# Patient Record
Sex: MALE | Race: WHITE | URBAN - METROPOLITAN AREA
[De-identification: names, ages, dates, MRNs, and addresses within clinical notes are randomized per-mention and may not be internally consistent; named-entity substitution may affect disease eponyms.]

---

## 2017-01-01 ENCOUNTER — APPOINTMENT (OUTPATIENT)
Dept: OCCUPATIONAL THERAPY | Facility: CLINIC | Age: 82
DRG: 246 | End: 2017-01-01
Attending: INTERNAL MEDICINE
Payer: MEDICARE

## 2017-01-01 ENCOUNTER — APPOINTMENT (OUTPATIENT)
Dept: CARDIOLOGY | Facility: CLINIC | Age: 82
DRG: 246 | End: 2017-01-01
Attending: INTERNAL MEDICINE
Payer: MEDICARE

## 2017-01-01 ENCOUNTER — APPOINTMENT (OUTPATIENT)
Dept: GENERAL RADIOLOGY | Facility: CLINIC | Age: 82
DRG: 246 | End: 2017-01-01
Attending: PHYSICIAN ASSISTANT
Payer: MEDICARE

## 2017-01-01 ENCOUNTER — HOSPITAL ENCOUNTER (INPATIENT)
Facility: CLINIC | Age: 82
LOS: 14 days | DRG: 246 | End: 2017-05-10
Attending: INTERNAL MEDICINE | Admitting: INTERNAL MEDICINE
Payer: MEDICARE

## 2017-01-01 ENCOUNTER — APPOINTMENT (OUTPATIENT)
Dept: GENERAL RADIOLOGY | Facility: CLINIC | Age: 82
End: 2017-01-01
Attending: EMERGENCY MEDICINE
Payer: MEDICARE

## 2017-01-01 ENCOUNTER — APPOINTMENT (OUTPATIENT)
Dept: GENERAL RADIOLOGY | Facility: CLINIC | Age: 82
DRG: 246 | End: 2017-01-01
Attending: INTERNAL MEDICINE
Payer: MEDICARE

## 2017-01-01 ENCOUNTER — APPOINTMENT (OUTPATIENT)
Dept: GENERAL RADIOLOGY | Facility: CLINIC | Age: 82
DRG: 246 | End: 2017-01-01
Attending: HOSPITALIST
Payer: MEDICARE

## 2017-01-01 ENCOUNTER — APPOINTMENT (OUTPATIENT)
Dept: ULTRASOUND IMAGING | Facility: CLINIC | Age: 82
DRG: 246 | End: 2017-01-01
Attending: HOSPITALIST
Payer: MEDICARE

## 2017-01-01 ENCOUNTER — APPOINTMENT (OUTPATIENT)
Dept: GENERAL RADIOLOGY | Facility: CLINIC | Age: 82
DRG: 246 | End: 2017-01-01
Attending: RADIOLOGY
Payer: MEDICARE

## 2017-01-01 ENCOUNTER — APPOINTMENT (OUTPATIENT)
Dept: GENERAL RADIOLOGY | Facility: CLINIC | Age: 82
DRG: 246 | End: 2017-01-01
Attending: STUDENT IN AN ORGANIZED HEALTH CARE EDUCATION/TRAINING PROGRAM
Payer: MEDICARE

## 2017-01-01 ENCOUNTER — HOSPITAL ENCOUNTER (EMERGENCY)
Facility: CLINIC | Age: 82
Discharge: OTHER INSTITUTION | End: 2017-04-26
Attending: EMERGENCY MEDICINE | Admitting: EMERGENCY MEDICINE
Payer: MEDICARE

## 2017-01-01 ENCOUNTER — APPOINTMENT (OUTPATIENT)
Dept: CT IMAGING | Facility: CLINIC | Age: 82
DRG: 246 | End: 2017-01-01
Attending: INTERNAL MEDICINE
Payer: MEDICARE

## 2017-01-01 VITALS
WEIGHT: 147.93 LBS | SYSTOLIC BLOOD PRESSURE: 120 MMHG | TEMPERATURE: 97 F | HEART RATE: 67 BPM | HEIGHT: 65 IN | DIASTOLIC BLOOD PRESSURE: 87 MMHG | BODY MASS INDEX: 24.65 KG/M2 | OXYGEN SATURATION: 95 %

## 2017-01-01 VITALS
SYSTOLIC BLOOD PRESSURE: 131 MMHG | BODY MASS INDEX: 24.13 KG/M2 | RESPIRATION RATE: 18 BRPM | OXYGEN SATURATION: 96 % | DIASTOLIC BLOOD PRESSURE: 86 MMHG | HEART RATE: 101 BPM | WEIGHT: 145 LBS | TEMPERATURE: 98 F

## 2017-01-01 DIAGNOSIS — I50.9 CHF (CONGESTIVE HEART FAILURE) (H): Primary | ICD-10-CM

## 2017-01-01 DIAGNOSIS — I21.3 ST ELEVATION MYOCARDIAL INFARCTION (STEMI), UNSPECIFIED ARTERY (H): ICD-10-CM

## 2017-01-01 LAB
ALBUMIN SERPL-MCNC: 2.2 G/DL (ref 3.4–5)
ALBUMIN UR-MCNC: 30 MG/DL
ALP SERPL-CCNC: 85 U/L (ref 40–150)
ALT SERPL W P-5'-P-CCNC: 34 U/L (ref 0–70)
ANION GAP SERPL CALCULATED.3IONS-SCNC: 11 MMOL/L (ref 3–14)
ANION GAP SERPL CALCULATED.3IONS-SCNC: 6 MMOL/L (ref 3–14)
ANION GAP SERPL CALCULATED.3IONS-SCNC: 7 MMOL/L (ref 3–14)
ANION GAP SERPL CALCULATED.3IONS-SCNC: 7 MMOL/L (ref 3–14)
ANION GAP SERPL CALCULATED.3IONS-SCNC: 8 MMOL/L (ref 3–14)
ANION GAP SERPL CALCULATED.3IONS-SCNC: 9 MMOL/L (ref 3–14)
APPEARANCE FLD: NORMAL
APPEARANCE FLD: NORMAL
APPEARANCE UR: CLEAR
APTT PPP: 121 SEC (ref 22–37)
AST SERPL W P-5'-P-CCNC: 39 U/L (ref 0–45)
BACTERIA SPEC CULT: NO GROWTH
BACTERIA SPEC CULT: NO GROWTH
BASE EXCESS BLDA CALC-SCNC: 3.4 MMOL/L
BASE EXCESS BLDA CALC-SCNC: 3.7 MMOL/L
BASOPHILS # BLD AUTO: 0 10E9/L (ref 0–0.2)
BASOPHILS # BLD AUTO: 0.1 10E9/L (ref 0–0.2)
BASOPHILS NFR BLD AUTO: 0.2 %
BASOPHILS NFR BLD AUTO: 0.4 %
BASOPHILS NFR FLD MANUAL: 1 %
BILIRUB DIRECT SERPL-MCNC: 0.2 MG/DL (ref 0–0.2)
BILIRUB SERPL-MCNC: 0.5 MG/DL (ref 0.2–1.3)
BILIRUB UR QL STRIP: NEGATIVE
BUN SERPL-MCNC: 13 MG/DL (ref 7–30)
BUN SERPL-MCNC: 18 MG/DL (ref 7–30)
BUN SERPL-MCNC: 33 MG/DL (ref 7–30)
BUN SERPL-MCNC: 35 MG/DL (ref 7–30)
BUN SERPL-MCNC: 36 MG/DL (ref 7–30)
BUN SERPL-MCNC: 37 MG/DL (ref 7–30)
BUN SERPL-MCNC: 37 MG/DL (ref 7–30)
BUN SERPL-MCNC: 40 MG/DL (ref 7–30)
BUN SERPL-MCNC: 40 MG/DL (ref 7–30)
BUN SERPL-MCNC: 42 MG/DL (ref 7–30)
BUN SERPL-MCNC: 42 MG/DL (ref 7–30)
BUN SERPL-MCNC: 44 MG/DL (ref 7–30)
BUN SERPL-MCNC: 45 MG/DL (ref 7–30)
BUN SERPL-MCNC: 50 MG/DL (ref 7–30)
BUN SERPL-MCNC: 52 MG/DL (ref 7–30)
BUN SERPL-MCNC: 54 MG/DL (ref 7–30)
CALCIUM SERPL-MCNC: 7.5 MG/DL (ref 8.5–10.1)
CALCIUM SERPL-MCNC: 8.1 MG/DL (ref 8.5–10.1)
CALCIUM SERPL-MCNC: 8.2 MG/DL (ref 8.5–10.1)
CALCIUM SERPL-MCNC: 8.2 MG/DL (ref 8.5–10.1)
CALCIUM SERPL-MCNC: 8.3 MG/DL (ref 8.5–10.1)
CALCIUM SERPL-MCNC: 8.4 MG/DL (ref 8.5–10.1)
CALCIUM SERPL-MCNC: 8.5 MG/DL (ref 8.5–10.1)
CALCIUM SERPL-MCNC: 8.6 MG/DL (ref 8.5–10.1)
CALCIUM SERPL-MCNC: 8.7 MG/DL (ref 8.5–10.1)
CALCIUM SERPL-MCNC: 8.7 MG/DL (ref 8.5–10.1)
CALCIUM SERPL-MCNC: 8.8 MG/DL (ref 8.5–10.1)
CHLORIDE SERPL-SCNC: 100 MMOL/L (ref 94–109)
CHLORIDE SERPL-SCNC: 101 MMOL/L (ref 94–109)
CHLORIDE SERPL-SCNC: 102 MMOL/L (ref 94–109)
CHLORIDE SERPL-SCNC: 102 MMOL/L (ref 94–109)
CHLORIDE SERPL-SCNC: 103 MMOL/L (ref 94–109)
CHLORIDE SERPL-SCNC: 104 MMOL/L (ref 94–109)
CHLORIDE SERPL-SCNC: 105 MMOL/L (ref 94–109)
CHLORIDE SERPL-SCNC: 109 MMOL/L (ref 94–109)
CHLORIDE SERPL-SCNC: 111 MMOL/L (ref 94–109)
CHLORIDE SERPL-SCNC: 99 MMOL/L (ref 94–109)
CHLORIDE SERPL-SCNC: 99 MMOL/L (ref 94–109)
CHOLEST SERPL-MCNC: 191 MG/DL
CO2 BLD-SCNC: 27 MMOL/L (ref 21–28)
CO2 BLDCOV-SCNC: 27 MMOL/L (ref 21–28)
CO2 BLDCOV-SCNC: 28 MMOL/L (ref 21–28)
CO2 SERPL-SCNC: 25 MMOL/L (ref 20–32)
CO2 SERPL-SCNC: 27 MMOL/L (ref 20–32)
CO2 SERPL-SCNC: 28 MMOL/L (ref 20–32)
CO2 SERPL-SCNC: 28 MMOL/L (ref 20–32)
CO2 SERPL-SCNC: 29 MMOL/L (ref 20–32)
CO2 SERPL-SCNC: 30 MMOL/L (ref 20–32)
CO2 SERPL-SCNC: 31 MMOL/L (ref 20–32)
CO2 SERPL-SCNC: 32 MMOL/L (ref 20–32)
COLOR FLD: NORMAL
COLOR FLD: YELLOW
COLOR UR AUTO: YELLOW
COPATH REPORT: NORMAL
COPATH REPORT: NORMAL
CREAT BLD-MCNC: 0.9 MG/DL (ref 0.66–1.25)
CREAT SERPL-MCNC: 0.83 MG/DL (ref 0.66–1.25)
CREAT SERPL-MCNC: 0.96 MG/DL (ref 0.66–1.25)
CREAT SERPL-MCNC: 1.13 MG/DL (ref 0.66–1.25)
CREAT SERPL-MCNC: 1.15 MG/DL (ref 0.66–1.25)
CREAT SERPL-MCNC: 1.17 MG/DL (ref 0.66–1.25)
CREAT SERPL-MCNC: 1.17 MG/DL (ref 0.66–1.25)
CREAT SERPL-MCNC: 1.19 MG/DL (ref 0.66–1.25)
CREAT SERPL-MCNC: 1.19 MG/DL (ref 0.66–1.25)
CREAT SERPL-MCNC: 1.21 MG/DL (ref 0.66–1.25)
CREAT SERPL-MCNC: 1.23 MG/DL (ref 0.66–1.25)
CREAT SERPL-MCNC: 1.27 MG/DL (ref 0.66–1.25)
CREAT SERPL-MCNC: 1.29 MG/DL (ref 0.66–1.25)
CREAT SERPL-MCNC: 1.35 MG/DL (ref 0.66–1.25)
CREAT SERPL-MCNC: 1.37 MG/DL (ref 0.66–1.25)
CREAT SERPL-MCNC: 1.4 MG/DL (ref 0.66–1.25)
CREAT SERPL-MCNC: 1.44 MG/DL (ref 0.66–1.25)
CREAT SERPL-MCNC: 1.58 MG/DL (ref 0.66–1.25)
DIFFERENTIAL METHOD BLD: ABNORMAL
EOSINOPHIL # BLD AUTO: 0 10E9/L (ref 0–0.7)
EOSINOPHIL # BLD AUTO: 0.2 10E9/L (ref 0–0.7)
EOSINOPHIL # BLD AUTO: 0.5 10E9/L (ref 0–0.7)
EOSINOPHIL # BLD AUTO: 0.8 10E9/L (ref 0–0.7)
EOSINOPHIL NFR BLD AUTO: 0.1 %
EOSINOPHIL NFR BLD AUTO: 1.1 %
EOSINOPHIL NFR BLD AUTO: 2.6 %
EOSINOPHIL NFR BLD AUTO: 4.7 %
EOSINOPHIL NFR FLD MANUAL: 1 %
EOSINOPHIL NFR FLD MANUAL: 8 %
ERYTHROCYTE [DISTWIDTH] IN BLOOD BY AUTOMATED COUNT: 15.2 % (ref 10–15)
ERYTHROCYTE [DISTWIDTH] IN BLOOD BY AUTOMATED COUNT: 15.2 % (ref 10–15)
ERYTHROCYTE [DISTWIDTH] IN BLOOD BY AUTOMATED COUNT: 15.3 % (ref 10–15)
ERYTHROCYTE [DISTWIDTH] IN BLOOD BY AUTOMATED COUNT: 15.4 % (ref 10–15)
ERYTHROCYTE [DISTWIDTH] IN BLOOD BY AUTOMATED COUNT: 15.4 % (ref 10–15)
ERYTHROCYTE [DISTWIDTH] IN BLOOD BY AUTOMATED COUNT: 15.5 % (ref 10–15)
ERYTHROCYTE [DISTWIDTH] IN BLOOD BY AUTOMATED COUNT: 15.6 % (ref 10–15)
ERYTHROCYTE [DISTWIDTH] IN BLOOD BY AUTOMATED COUNT: 15.7 % (ref 10–15)
ERYTHROCYTE [DISTWIDTH] IN BLOOD BY AUTOMATED COUNT: 15.7 % (ref 10–15)
FLUAV H1 2009 PAND RNA SPEC QL NAA+PROBE: NEGATIVE
FLUAV H1 RNA SPEC QL NAA+PROBE: NEGATIVE
FLUAV H3 RNA SPEC QL NAA+PROBE: NEGATIVE
FLUAV RNA SPEC QL NAA+PROBE: NEGATIVE
FLUBV RNA SPEC QL NAA+PROBE: NEGATIVE
GFR SERPL CREATININE-BSD FRML MDRD: 42 ML/MIN/1.7M2
GFR SERPL CREATININE-BSD FRML MDRD: 47 ML/MIN/1.7M2
GFR SERPL CREATININE-BSD FRML MDRD: 49 ML/MIN/1.7M2
GFR SERPL CREATININE-BSD FRML MDRD: 50 ML/MIN/1.7M2
GFR SERPL CREATININE-BSD FRML MDRD: 51 ML/MIN/1.7M2
GFR SERPL CREATININE-BSD FRML MDRD: 53 ML/MIN/1.7M2
GFR SERPL CREATININE-BSD FRML MDRD: 54 ML/MIN/1.7M2
GFR SERPL CREATININE-BSD FRML MDRD: 56 ML/MIN/1.7M2
GFR SERPL CREATININE-BSD FRML MDRD: 57 ML/MIN/1.7M2
GFR SERPL CREATININE-BSD FRML MDRD: 59 ML/MIN/1.7M2
GFR SERPL CREATININE-BSD FRML MDRD: 59 ML/MIN/1.7M2
GFR SERPL CREATININE-BSD FRML MDRD: 60 ML/MIN/1.7M2
GFR SERPL CREATININE-BSD FRML MDRD: 60 ML/MIN/1.7M2
GFR SERPL CREATININE-BSD FRML MDRD: 61 ML/MIN/1.7M2
GFR SERPL CREATININE-BSD FRML MDRD: 62 ML/MIN/1.7M2
GFR SERPL CREATININE-BSD FRML MDRD: 75 ML/MIN/1.7M2
GFR SERPL CREATININE-BSD FRML MDRD: 81 ML/MIN/1.7M2
GFR SERPL CREATININE-BSD FRML MDRD: 89 ML/MIN/1.7M2
GLUCOSE BLDC GLUCOMTR-MCNC: 100 MG/DL (ref 70–99)
GLUCOSE BLDC GLUCOMTR-MCNC: 100 MG/DL (ref 70–99)
GLUCOSE BLDC GLUCOMTR-MCNC: 101 MG/DL (ref 70–99)
GLUCOSE BLDC GLUCOMTR-MCNC: 101 MG/DL (ref 70–99)
GLUCOSE BLDC GLUCOMTR-MCNC: 102 MG/DL (ref 70–99)
GLUCOSE BLDC GLUCOMTR-MCNC: 103 MG/DL (ref 70–99)
GLUCOSE BLDC GLUCOMTR-MCNC: 103 MG/DL (ref 70–99)
GLUCOSE BLDC GLUCOMTR-MCNC: 105 MG/DL (ref 70–99)
GLUCOSE BLDC GLUCOMTR-MCNC: 105 MG/DL (ref 70–99)
GLUCOSE BLDC GLUCOMTR-MCNC: 107 MG/DL (ref 70–99)
GLUCOSE BLDC GLUCOMTR-MCNC: 108 MG/DL (ref 70–99)
GLUCOSE BLDC GLUCOMTR-MCNC: 108 MG/DL (ref 70–99)
GLUCOSE BLDC GLUCOMTR-MCNC: 109 MG/DL (ref 70–99)
GLUCOSE BLDC GLUCOMTR-MCNC: 11 MG/DL (ref 70–99)
GLUCOSE BLDC GLUCOMTR-MCNC: 110 MG/DL (ref 70–99)
GLUCOSE BLDC GLUCOMTR-MCNC: 112 MG/DL (ref 70–99)
GLUCOSE BLDC GLUCOMTR-MCNC: 114 MG/DL (ref 70–99)
GLUCOSE BLDC GLUCOMTR-MCNC: 115 MG/DL (ref 70–99)
GLUCOSE BLDC GLUCOMTR-MCNC: 116 MG/DL (ref 70–99)
GLUCOSE BLDC GLUCOMTR-MCNC: 117 MG/DL (ref 70–99)
GLUCOSE BLDC GLUCOMTR-MCNC: 119 MG/DL (ref 70–99)
GLUCOSE BLDC GLUCOMTR-MCNC: 122 MG/DL (ref 70–99)
GLUCOSE BLDC GLUCOMTR-MCNC: 125 MG/DL (ref 70–99)
GLUCOSE BLDC GLUCOMTR-MCNC: 126 MG/DL (ref 70–99)
GLUCOSE BLDC GLUCOMTR-MCNC: 129 MG/DL (ref 70–99)
GLUCOSE BLDC GLUCOMTR-MCNC: 133 MG/DL (ref 70–99)
GLUCOSE BLDC GLUCOMTR-MCNC: 141 MG/DL (ref 70–99)
GLUCOSE BLDC GLUCOMTR-MCNC: 143 MG/DL (ref 70–99)
GLUCOSE BLDC GLUCOMTR-MCNC: 143 MG/DL (ref 70–99)
GLUCOSE BLDC GLUCOMTR-MCNC: 147 MG/DL (ref 70–99)
GLUCOSE BLDC GLUCOMTR-MCNC: 148 MG/DL (ref 70–99)
GLUCOSE BLDC GLUCOMTR-MCNC: 152 MG/DL (ref 70–99)
GLUCOSE BLDC GLUCOMTR-MCNC: 154 MG/DL (ref 70–99)
GLUCOSE BLDC GLUCOMTR-MCNC: 157 MG/DL (ref 70–99)
GLUCOSE BLDC GLUCOMTR-MCNC: 159 MG/DL (ref 70–99)
GLUCOSE BLDC GLUCOMTR-MCNC: 160 MG/DL (ref 70–99)
GLUCOSE BLDC GLUCOMTR-MCNC: 161 MG/DL (ref 70–99)
GLUCOSE BLDC GLUCOMTR-MCNC: 161 MG/DL (ref 70–99)
GLUCOSE BLDC GLUCOMTR-MCNC: 164 MG/DL (ref 70–99)
GLUCOSE BLDC GLUCOMTR-MCNC: 165 MG/DL (ref 70–99)
GLUCOSE BLDC GLUCOMTR-MCNC: 165 MG/DL (ref 70–99)
GLUCOSE BLDC GLUCOMTR-MCNC: 172 MG/DL (ref 70–99)
GLUCOSE BLDC GLUCOMTR-MCNC: 175 MG/DL (ref 70–99)
GLUCOSE BLDC GLUCOMTR-MCNC: 176 MG/DL (ref 70–99)
GLUCOSE BLDC GLUCOMTR-MCNC: 179 MG/DL (ref 70–99)
GLUCOSE BLDC GLUCOMTR-MCNC: 179 MG/DL (ref 70–99)
GLUCOSE BLDC GLUCOMTR-MCNC: 191 MG/DL (ref 70–99)
GLUCOSE BLDC GLUCOMTR-MCNC: 202 MG/DL (ref 70–99)
GLUCOSE BLDC GLUCOMTR-MCNC: 207 MG/DL (ref 70–99)
GLUCOSE BLDC GLUCOMTR-MCNC: 253 MG/DL (ref 70–99)
GLUCOSE BLDC GLUCOMTR-MCNC: 315 MG/DL (ref 70–99)
GLUCOSE BLDC GLUCOMTR-MCNC: 32 MG/DL (ref 70–99)
GLUCOSE BLDC GLUCOMTR-MCNC: 43 MG/DL (ref 70–99)
GLUCOSE BLDC GLUCOMTR-MCNC: 57 MG/DL (ref 70–99)
GLUCOSE BLDC GLUCOMTR-MCNC: 58 MG/DL (ref 70–99)
GLUCOSE BLDC GLUCOMTR-MCNC: 60 MG/DL (ref 70–99)
GLUCOSE BLDC GLUCOMTR-MCNC: 69 MG/DL (ref 70–99)
GLUCOSE BLDC GLUCOMTR-MCNC: 69 MG/DL (ref 70–99)
GLUCOSE BLDC GLUCOMTR-MCNC: 70 MG/DL (ref 70–99)
GLUCOSE BLDC GLUCOMTR-MCNC: 72 MG/DL (ref 70–99)
GLUCOSE BLDC GLUCOMTR-MCNC: 77 MG/DL (ref 70–99)
GLUCOSE BLDC GLUCOMTR-MCNC: 80 MG/DL (ref 70–99)
GLUCOSE BLDC GLUCOMTR-MCNC: 83 MG/DL (ref 70–99)
GLUCOSE BLDC GLUCOMTR-MCNC: 87 MG/DL (ref 70–99)
GLUCOSE BLDC GLUCOMTR-MCNC: 89 MG/DL (ref 70–99)
GLUCOSE BLDC GLUCOMTR-MCNC: 93 MG/DL (ref 70–99)
GLUCOSE BLDC GLUCOMTR-MCNC: 96 MG/DL (ref 70–99)
GLUCOSE BLDC GLUCOMTR-MCNC: 97 MG/DL (ref 70–99)
GLUCOSE BLDC GLUCOMTR-MCNC: 97 MG/DL (ref 70–99)
GLUCOSE BLDC GLUCOMTR-MCNC: 98 MG/DL (ref 70–99)
GLUCOSE BLDC GLUCOMTR-MCNC: <10 MG/DL (ref 70–99)
GLUCOSE FLD-MCNC: 165 MG/DL
GLUCOSE FLD-MCNC: 167 MG/DL
GLUCOSE SERPL-MCNC: 120 MG/DL (ref 70–99)
GLUCOSE SERPL-MCNC: 123 MG/DL (ref 70–99)
GLUCOSE SERPL-MCNC: 128 MG/DL (ref 70–99)
GLUCOSE SERPL-MCNC: 130 MG/DL (ref 70–99)
GLUCOSE SERPL-MCNC: 130 MG/DL (ref 70–99)
GLUCOSE SERPL-MCNC: 134 MG/DL (ref 70–99)
GLUCOSE SERPL-MCNC: 140 MG/DL (ref 70–99)
GLUCOSE SERPL-MCNC: 140 MG/DL (ref 70–99)
GLUCOSE SERPL-MCNC: 141 MG/DL (ref 70–99)
GLUCOSE SERPL-MCNC: 145 MG/DL (ref 70–99)
GLUCOSE SERPL-MCNC: 152 MG/DL (ref 70–99)
GLUCOSE SERPL-MCNC: 153 MG/DL (ref 70–99)
GLUCOSE SERPL-MCNC: 153 MG/DL (ref 70–99)
GLUCOSE SERPL-MCNC: 187 MG/DL (ref 70–99)
GLUCOSE SERPL-MCNC: 200 MG/DL (ref 70–99)
GLUCOSE SERPL-MCNC: 259 MG/DL (ref 70–99)
GLUCOSE SERPL-MCNC: 294 MG/DL (ref 70–99)
GLUCOSE UR STRIP-MCNC: NEGATIVE MG/DL
GRAM STN SPEC: NORMAL
GRAM STN SPEC: NORMAL
HADV DNA SPEC QL NAA+PROBE: NEGATIVE
HADV DNA SPEC QL NAA+PROBE: NEGATIVE
HBA1C MFR BLD: 6.3 % (ref 4.3–6)
HCO3 BLD-SCNC: 27 MMOL/L (ref 21–28)
HCO3 BLD-SCNC: 27 MMOL/L (ref 21–28)
HCT VFR BLD AUTO: 33.1 % (ref 40–53)
HCT VFR BLD AUTO: 38.1 % (ref 40–53)
HCT VFR BLD AUTO: 38.3 % (ref 40–53)
HCT VFR BLD AUTO: 39.2 % (ref 40–53)
HCT VFR BLD AUTO: 39.2 % (ref 40–53)
HCT VFR BLD AUTO: 39.5 % (ref 40–53)
HCT VFR BLD AUTO: 39.9 % (ref 40–53)
HCT VFR BLD AUTO: 40.3 % (ref 40–53)
HCT VFR BLD AUTO: 40.8 % (ref 40–53)
HCT VFR BLD AUTO: 44 % (ref 40–53)
HCT VFR BLD AUTO: 47.6 % (ref 40–53)
HDLC SERPL-MCNC: 40 MG/DL
HGB BLD-MCNC: 11 G/DL (ref 13.3–17.7)
HGB BLD-MCNC: 12.7 G/DL (ref 13.3–17.7)
HGB BLD-MCNC: 12.8 G/DL (ref 13.3–17.7)
HGB BLD-MCNC: 12.9 G/DL (ref 13.3–17.7)
HGB BLD-MCNC: 13 G/DL (ref 13.3–17.7)
HGB BLD-MCNC: 13.1 G/DL (ref 13.3–17.7)
HGB BLD-MCNC: 13.3 G/DL (ref 13.3–17.7)
HGB BLD-MCNC: 13.4 G/DL (ref 13.3–17.7)
HGB BLD-MCNC: 13.6 G/DL (ref 13.3–17.7)
HGB BLD-MCNC: 13.8 G/DL (ref 13.3–17.7)
HGB BLD-MCNC: 14.8 G/DL (ref 13.3–17.7)
HGB BLD-MCNC: 15.6 G/DL (ref 13.3–17.7)
HGB UR QL STRIP: NEGATIVE
HMPV RNA SPEC QL NAA+PROBE: NEGATIVE
HPIV1 RNA SPEC QL NAA+PROBE: NEGATIVE
HPIV2 RNA SPEC QL NAA+PROBE: NEGATIVE
HPIV3 RNA SPEC QL NAA+PROBE: NEGATIVE
IMM GRANULOCYTES # BLD: 0.1 10E9/L (ref 0–0.4)
IMM GRANULOCYTES # BLD: 0.1 10E9/L (ref 0–0.4)
IMM GRANULOCYTES # BLD: 0.2 10E9/L (ref 0–0.4)
IMM GRANULOCYTES # BLD: 0.2 10E9/L (ref 0–0.4)
IMM GRANULOCYTES NFR BLD: 0.5 %
IMM GRANULOCYTES NFR BLD: 0.7 %
IMM GRANULOCYTES NFR BLD: 0.8 %
IMM GRANULOCYTES NFR BLD: 0.8 %
INR PPP: 0.96 (ref 0.86–1.14)
INTERPRETATION ECG - MUSE: NORMAL
KCT BLD-ACNC: 164 SEC (ref 105–167)
KCT BLD-ACNC: 224 SEC (ref 105–167)
KCT BLD-ACNC: 619 SEC (ref 105–167)
KETONES UR STRIP-MCNC: NEGATIVE MG/DL
LACTATE BLD-SCNC: 1.1 MMOL/L (ref 0.7–2.1)
LACTATE BLD-SCNC: 1.3 MMOL/L (ref 0.7–2.1)
LACTATE BLD-SCNC: 1.6 MMOL/L (ref 0.7–2.1)
LACTATE BLD-SCNC: 2 MMOL/L (ref 0.7–2.1)
LACTATE BLD-SCNC: 2.2 MMOL/L (ref 0.7–2.1)
LACTATE BLD-SCNC: 2.9 MMOL/L (ref 0.7–2.1)
LACTATE BLD-SCNC: 3.6 MMOL/L (ref 0.7–2.1)
LACTATE BLD-SCNC: 3.6 MMOL/L (ref 0.7–2.1)
LDH FLD L TO P-CCNC: 356 U/L
LDH FLD L TO P-CCNC: 535 U/L
LDH SERPL L TO P-CCNC: 429 U/L (ref 85–227)
LDH SERPL L TO P-CCNC: 515 U/L (ref 85–227)
LDLC SERPL CALC-MCNC: 138 MG/DL
LEUKOCYTE ESTERASE UR QL STRIP: NEGATIVE
LYMPHOCYTES # BLD AUTO: 0.7 10E9/L (ref 0.8–5.3)
LYMPHOCYTES # BLD AUTO: 1.1 10E9/L (ref 0.8–5.3)
LYMPHOCYTES # BLD AUTO: 1.2 10E9/L (ref 0.8–5.3)
LYMPHOCYTES # BLD AUTO: 1.5 10E9/L (ref 0.8–5.3)
LYMPHOCYTES NFR BLD AUTO: 3.6 %
LYMPHOCYTES NFR BLD AUTO: 5.5 %
LYMPHOCYTES NFR BLD AUTO: 7.9 %
LYMPHOCYTES NFR BLD AUTO: 8.8 %
LYMPHOCYTES NFR FLD MANUAL: 50 %
LYMPHOCYTES NFR FLD MANUAL: 60 %
MAGNESIUM SERPL-MCNC: 2.7 MG/DL (ref 1.6–2.3)
MCH RBC QN AUTO: 29.7 PG (ref 26.5–33)
MCH RBC QN AUTO: 29.7 PG (ref 26.5–33)
MCH RBC QN AUTO: 29.8 PG (ref 26.5–33)
MCH RBC QN AUTO: 30 PG (ref 26.5–33)
MCH RBC QN AUTO: 30.1 PG (ref 26.5–33)
MCH RBC QN AUTO: 30.1 PG (ref 26.5–33)
MCH RBC QN AUTO: 30.2 PG (ref 26.5–33)
MCH RBC QN AUTO: 30.2 PG (ref 26.5–33)
MCH RBC QN AUTO: 30.3 PG (ref 26.5–33)
MCH RBC QN AUTO: 30.3 PG (ref 26.5–33)
MCH RBC QN AUTO: 30.6 PG (ref 26.5–33)
MCHC RBC AUTO-ENTMCNC: 32.8 G/DL (ref 31.5–36.5)
MCHC RBC AUTO-ENTMCNC: 32.9 G/DL (ref 31.5–36.5)
MCHC RBC AUTO-ENTMCNC: 33.2 G/DL (ref 31.5–36.5)
MCHC RBC AUTO-ENTMCNC: 33.3 G/DL (ref 31.5–36.5)
MCHC RBC AUTO-ENTMCNC: 33.4 G/DL (ref 31.5–36.5)
MCHC RBC AUTO-ENTMCNC: 33.6 G/DL (ref 31.5–36.5)
MCHC RBC AUTO-ENTMCNC: 33.6 G/DL (ref 31.5–36.5)
MCHC RBC AUTO-ENTMCNC: 33.7 G/DL (ref 31.5–36.5)
MCHC RBC AUTO-ENTMCNC: 33.7 G/DL (ref 31.5–36.5)
MCHC RBC AUTO-ENTMCNC: 33.8 G/DL (ref 31.5–36.5)
MCHC RBC AUTO-ENTMCNC: 33.9 G/DL (ref 31.5–36.5)
MCV RBC AUTO: 89 FL (ref 78–100)
MCV RBC AUTO: 90 FL (ref 78–100)
MCV RBC AUTO: 91 FL (ref 78–100)
MCV RBC AUTO: 92 FL (ref 78–100)
MICRO REPORT STATUS: NORMAL
MICROBIOLOGIST REVIEW: NORMAL
MONOCYTES # BLD AUTO: 1.3 10E9/L (ref 0–1.3)
MONOCYTES # BLD AUTO: 1.6 10E9/L (ref 0–1.3)
MONOCYTES # BLD AUTO: 1.6 10E9/L (ref 0–1.3)
MONOCYTES # BLD AUTO: 2.1 10E9/L (ref 0–1.3)
MONOCYTES NFR BLD AUTO: 8.2 %
MONOCYTES NFR BLD AUTO: 8.9 %
MONOCYTES NFR BLD AUTO: 9 %
MONOCYTES NFR BLD AUTO: 9.5 %
MONOS+MACROS NFR FLD MANUAL: 4 %
MONOS+MACROS NFR FLD MANUAL: 9 %
MUCOUS THREADS #/AREA URNS LPF: PRESENT /LPF
NEUTROPHILS # BLD AUTO: 11.6 10E9/L (ref 1.6–8.3)
NEUTROPHILS # BLD AUTO: 13.3 10E9/L (ref 1.6–8.3)
NEUTROPHILS # BLD AUTO: 16.8 10E9/L (ref 1.6–8.3)
NEUTROPHILS # BLD AUTO: 17.9 10E9/L (ref 1.6–8.3)
NEUTROPHILS NFR BLD AUTO: 76.6 %
NEUTROPHILS NFR BLD AUTO: 82.2 %
NEUTROPHILS NFR BLD AUTO: 82.9 %
NEUTROPHILS NFR BLD AUTO: 84.6 %
NEUTS BAND NFR FLD MANUAL: 29 %
NEUTS BAND NFR FLD MANUAL: 34 %
NITRATE UR QL: NEGATIVE
NONHDLC SERPL-MCNC: 151 MG/DL
NRBC # BLD AUTO: 0 10*3/UL
NRBC BLD AUTO-RTO: 0 /100
NT-PROBNP SERPL-MCNC: ABNORMAL PG/ML (ref 0–1800)
O2/TOTAL GAS SETTING VFR VENT: ABNORMAL %
OTHER CELLS FLD MANUAL: 1 %
OTHER CELLS FLD MANUAL: 3 %
OXYHGB MFR BLD: 93 % (ref 92–100)
OXYHGB MFR BLD: 96 % (ref 92–100)
PCO2 BLD: 36 MM HG (ref 35–45)
PCO2 BLD: 38 MM HG (ref 35–45)
PCO2 BLD: 38 MM HG (ref 35–45)
PCO2 BLDV: 42 MM HG (ref 40–50)
PCO2 BLDV: 42 MM HG (ref 40–50)
PH BLD: 7.46 PH (ref 7.35–7.45)
PH BLD: 7.47 PH (ref 7.35–7.45)
PH BLD: 7.49 PH (ref 7.35–7.45)
PH BLDV: 7.42 PH (ref 7.32–7.43)
PH BLDV: 7.44 PH (ref 7.32–7.43)
PH FLD: 8 PH
PH FLD: 8.5 PH
PH UR STRIP: 5 PH (ref 5–7)
PLATELET # BLD AUTO: 282 10E9/L (ref 150–450)
PLATELET # BLD AUTO: 313 10E9/L (ref 150–450)
PLATELET # BLD AUTO: 359 10E9/L (ref 150–450)
PLATELET # BLD AUTO: 383 10E9/L (ref 150–450)
PLATELET # BLD AUTO: 402 10E9/L (ref 150–450)
PLATELET # BLD AUTO: 433 10E9/L (ref 150–450)
PLATELET # BLD AUTO: 448 10E9/L (ref 150–450)
PLATELET # BLD AUTO: 453 10E9/L (ref 150–450)
PLATELET # BLD AUTO: 506 10E9/L (ref 150–450)
PLATELET # BLD AUTO: 521 10E9/L (ref 150–450)
PLATELET # BLD AUTO: 526 10E9/L (ref 150–450)
PO2 BLD: 67 MM HG (ref 80–105)
PO2 BLD: 79 MM HG (ref 80–105)
PO2 BLD: 83 MM HG (ref 80–105)
PO2 BLDV: 34 MM HG (ref 25–47)
PO2 BLDV: 35 MM HG (ref 25–47)
POTASSIUM SERPL-SCNC: 3.3 MMOL/L (ref 3.4–5.3)
POTASSIUM SERPL-SCNC: 3.6 MMOL/L (ref 3.4–5.3)
POTASSIUM SERPL-SCNC: 3.8 MMOL/L (ref 3.4–5.3)
POTASSIUM SERPL-SCNC: 3.8 MMOL/L (ref 3.4–5.3)
POTASSIUM SERPL-SCNC: 3.9 MMOL/L (ref 3.4–5.3)
POTASSIUM SERPL-SCNC: 3.9 MMOL/L (ref 3.4–5.3)
POTASSIUM SERPL-SCNC: 4 MMOL/L (ref 3.4–5.3)
POTASSIUM SERPL-SCNC: 4.3 MMOL/L (ref 3.4–5.3)
POTASSIUM SERPL-SCNC: 4.3 MMOL/L (ref 3.4–5.3)
POTASSIUM SERPL-SCNC: 4.4 MMOL/L (ref 3.4–5.3)
POTASSIUM SERPL-SCNC: 4.4 MMOL/L (ref 3.4–5.3)
POTASSIUM SERPL-SCNC: 4.6 MMOL/L (ref 3.4–5.3)
POTASSIUM SERPL-SCNC: 4.8 MMOL/L (ref 3.4–5.3)
PROCALCITONIN SERPL-MCNC: 0.08 NG/ML
PROCALCITONIN SERPL-MCNC: 0.11 NG/ML
PROCALCITONIN SERPL-MCNC: 0.16 NG/ML
PROCALCITONIN SERPL-MCNC: 0.19 NG/ML
PROT FLD-MCNC: 2.7 G/DL
PROT FLD-MCNC: 3.4 G/DL
PROT SERPL-MCNC: 6.6 G/DL (ref 6.8–8.8)
PROT SERPL-MCNC: 6.7 G/DL (ref 6.8–8.8)
PROT SERPL-MCNC: 7.1 G/DL (ref 6.8–8.8)
RBC # BLD AUTO: 3.69 10E12/L (ref 4.4–5.9)
RBC # BLD AUTO: 4.26 10E12/L (ref 4.4–5.9)
RBC # BLD AUTO: 4.27 10E12/L (ref 4.4–5.9)
RBC # BLD AUTO: 4.34 10E12/L (ref 4.4–5.9)
RBC # BLD AUTO: 4.37 10E12/L (ref 4.4–5.9)
RBC # BLD AUTO: 4.42 10E12/L (ref 4.4–5.9)
RBC # BLD AUTO: 4.42 10E12/L (ref 4.4–5.9)
RBC # BLD AUTO: 4.52 10E12/L (ref 4.4–5.9)
RBC # BLD AUTO: 4.57 10E12/L (ref 4.4–5.9)
RBC # BLD AUTO: 4.84 10E12/L (ref 4.4–5.9)
RBC # BLD AUTO: 5.2 10E12/L (ref 4.4–5.9)
RBC #/AREA URNS AUTO: <1 /HPF (ref 0–2)
RHINOVIRUS RNA SPEC QL NAA+PROBE: NEGATIVE
RSV RNA SPEC QL NAA+PROBE: NEGATIVE
RSV RNA SPEC QL NAA+PROBE: NEGATIVE
SAO2 % BLDA FROM PO2: 97 % (ref 92–100)
SAO2 % BLDV FROM PO2: 66 %
SAO2 % BLDV FROM PO2: 70 %
SODIUM SERPL-SCNC: 137 MMOL/L (ref 133–144)
SODIUM SERPL-SCNC: 137 MMOL/L (ref 133–144)
SODIUM SERPL-SCNC: 138 MMOL/L (ref 133–144)
SODIUM SERPL-SCNC: 138 MMOL/L (ref 133–144)
SODIUM SERPL-SCNC: 139 MMOL/L (ref 133–144)
SODIUM SERPL-SCNC: 139 MMOL/L (ref 133–144)
SODIUM SERPL-SCNC: 140 MMOL/L (ref 133–144)
SODIUM SERPL-SCNC: 140 MMOL/L (ref 133–144)
SODIUM SERPL-SCNC: 141 MMOL/L (ref 133–144)
SODIUM SERPL-SCNC: 142 MMOL/L (ref 133–144)
SODIUM SERPL-SCNC: 145 MMOL/L (ref 133–144)
SODIUM SERPL-SCNC: 148 MMOL/L (ref 133–144)
SP GR UR STRIP: 1.02 (ref 1–1.03)
SPECIMEN SOURCE FLD: NORMAL
SPECIMEN SOURCE: NORMAL
SQUAMOUS #/AREA URNS AUTO: <1 /HPF (ref 0–1)
TRIGL SERPL-MCNC: 64 MG/DL
TROPONIN I BLD-MCNC: 14.44 UG/L (ref 0–0.1)
TROPONIN I SERPL-MCNC: 125.57 UG/L (ref 0–0.04)
TROPONIN I SERPL-MCNC: 29 UG/L (ref 0–0.04)
TROPONIN I SERPL-MCNC: ABNORMAL UG/L (ref 0–0.04)
URN SPEC COLLECT METH UR: ABNORMAL
UROBILINOGEN UR STRIP-MCNC: NORMAL MG/DL (ref 0–2)
WBC # BLD AUTO: 13 10E9/L (ref 4–11)
WBC # BLD AUTO: 13.2 10E9/L (ref 4–11)
WBC # BLD AUTO: 13.5 10E9/L (ref 4–11)
WBC # BLD AUTO: 14.1 10E9/L (ref 4–11)
WBC # BLD AUTO: 14.6 10E9/L (ref 4–11)
WBC # BLD AUTO: 14.7 10E9/L (ref 4–11)
WBC # BLD AUTO: 16.7 10E9/L (ref 4–11)
WBC # BLD AUTO: 17.4 10E9/L (ref 4–11)
WBC # BLD AUTO: 19.9 10E9/L (ref 4–11)
WBC # BLD AUTO: 20.2 10E9/L (ref 4–11)
WBC # BLD AUTO: 21.6 10E9/L (ref 4–11)
WBC # BLD AUTO: 27.6 10E9/L (ref 4–11)
WBC # FLD AUTO: NORMAL /UL
WBC # FLD AUTO: NORMAL /UL
WBC #/AREA URNS AUTO: 1 /HPF (ref 0–2)

## 2017-01-01 PROCEDURE — 25000128 H RX IP 250 OP 636: Performed by: HOSPITALIST

## 2017-01-01 PROCEDURE — 25000128 H RX IP 250 OP 636: Performed by: INTERNAL MEDICINE

## 2017-01-01 PROCEDURE — 00000146 ZZHCL STATISTIC GLUCOSE BY METER IP

## 2017-01-01 PROCEDURE — 93005 ELECTROCARDIOGRAM TRACING: CPT

## 2017-01-01 PROCEDURE — 84157 ASSAY OF PROTEIN OTHER: CPT | Performed by: HOSPITALIST

## 2017-01-01 PROCEDURE — A9270 NON-COVERED ITEM OR SERVICE: HCPCS | Mod: GY | Performed by: INTERNAL MEDICINE

## 2017-01-01 PROCEDURE — 80048 BASIC METABOLIC PNL TOTAL CA: CPT | Performed by: PHYSICIAN ASSISTANT

## 2017-01-01 PROCEDURE — 83615 LACTATE (LD) (LDH) ENZYME: CPT | Performed by: HOSPITALIST

## 2017-01-01 PROCEDURE — 25000132 ZZH RX MED GY IP 250 OP 250 PS 637: Mod: GY | Performed by: INTERNAL MEDICINE

## 2017-01-01 PROCEDURE — 27210759 ZZH DEVICE INFLATION CR6

## 2017-01-01 PROCEDURE — 25000132 ZZH RX MED GY IP 250 OP 250 PS 637: Mod: GY | Performed by: HOSPITALIST

## 2017-01-01 PROCEDURE — 88305 TISSUE EXAM BY PATHOLOGIST: CPT | Performed by: HOSPITALIST

## 2017-01-01 PROCEDURE — 88112 CYTOPATH CELL ENHANCE TECH: CPT | Mod: 26 | Performed by: HOSPITALIST

## 2017-01-01 PROCEDURE — 36415 COLL VENOUS BLD VENIPUNCTURE: CPT | Performed by: HOSPITALIST

## 2017-01-01 PROCEDURE — C1887 CATHETER, GUIDING: HCPCS

## 2017-01-01 PROCEDURE — 36415 COLL VENOUS BLD VENIPUNCTURE: CPT | Performed by: PHYSICIAN ASSISTANT

## 2017-01-01 PROCEDURE — 99153 MOD SED SAME PHYS/QHP EA: CPT | Mod: GC | Performed by: INTERNAL MEDICINE

## 2017-01-01 PROCEDURE — 40000914 ZZH STATISTIC SITTER, DAY HOURS

## 2017-01-01 PROCEDURE — 25000125 ZZHC RX 250: Performed by: HOSPITALIST

## 2017-01-01 PROCEDURE — 82945 GLUCOSE OTHER FLUID: CPT | Performed by: HOSPITALIST

## 2017-01-01 PROCEDURE — 83605 ASSAY OF LACTIC ACID: CPT | Performed by: HOSPITALIST

## 2017-01-01 PROCEDURE — A9270 NON-COVERED ITEM OR SERVICE: HCPCS | Mod: GY | Performed by: HOSPITALIST

## 2017-01-01 PROCEDURE — 88112 CYTOPATH CELL ENHANCE TECH: CPT | Performed by: HOSPITALIST

## 2017-01-01 PROCEDURE — 85027 COMPLETE CBC AUTOMATED: CPT | Performed by: HOSPITALIST

## 2017-01-01 PROCEDURE — 71020 XR CHEST 2 VW: CPT

## 2017-01-01 PROCEDURE — 87070 CULTURE OTHR SPECIMN AEROBIC: CPT | Performed by: HOSPITALIST

## 2017-01-01 PROCEDURE — 82565 ASSAY OF CREATININE: CPT | Performed by: HOSPITALIST

## 2017-01-01 PROCEDURE — 97535 SELF CARE MNGMENT TRAINING: CPT | Mod: GO | Performed by: OCCUPATIONAL THERAPIST

## 2017-01-01 PROCEDURE — 40000915 ZZH STATISTIC SITTER, EVENING HOURS

## 2017-01-01 PROCEDURE — 92941 PRQ TRLML REVSC TOT OCCL AMI: CPT | Mod: LD | Performed by: INTERNAL MEDICINE

## 2017-01-01 PROCEDURE — 27210787 ZZH MANIFOLD CR2

## 2017-01-01 PROCEDURE — 99233 SBSQ HOSP IP/OBS HIGH 50: CPT | Performed by: INTERNAL MEDICINE

## 2017-01-01 PROCEDURE — 40000264 ECHO LIMITED WITH LUMASON

## 2017-01-01 PROCEDURE — 27210190 US THORACENTESIS

## 2017-01-01 PROCEDURE — 84145 PROCALCITONIN (PCT): CPT | Performed by: INTERNAL MEDICINE

## 2017-01-01 PROCEDURE — 27210998 ZZH ACCESS HEART CATH CR6

## 2017-01-01 PROCEDURE — 99232 SBSQ HOSP IP/OBS MODERATE 35: CPT | Performed by: INTERNAL MEDICINE

## 2017-01-01 PROCEDURE — 93308 TTE F-UP OR LMTD: CPT | Mod: 26 | Performed by: INTERNAL MEDICINE

## 2017-01-01 PROCEDURE — 25000128 H RX IP 250 OP 636: Performed by: PHYSICIAN ASSISTANT

## 2017-01-01 PROCEDURE — 93451 RIGHT HEART CATH: CPT | Mod: 26 | Performed by: INTERNAL MEDICINE

## 2017-01-01 PROCEDURE — 99291 CRITICAL CARE FIRST HOUR: CPT | Mod: 25 | Performed by: INTERNAL MEDICINE

## 2017-01-01 PROCEDURE — 21000001 ZZH R&B HEART CARE

## 2017-01-01 PROCEDURE — 25000125 ZZHC RX 250: Performed by: STUDENT IN AN ORGANIZED HEALTH CARE EDUCATION/TRAINING PROGRAM

## 2017-01-01 PROCEDURE — 25000125 ZZHC RX 250: Performed by: INTERNAL MEDICINE

## 2017-01-01 PROCEDURE — A9270 NON-COVERED ITEM OR SERVICE: HCPCS | Mod: GY

## 2017-01-01 PROCEDURE — 25000132 ZZH RX MED GY IP 250 OP 250 PS 637: Mod: GY | Performed by: PHYSICIAN ASSISTANT

## 2017-01-01 PROCEDURE — 25000128 H RX IP 250 OP 636: Performed by: EMERGENCY MEDICINE

## 2017-01-01 PROCEDURE — 93456 R HRT CORONARY ARTERY ANGIO: CPT

## 2017-01-01 PROCEDURE — 40000133 ZZH STATISTIC OT WARD VISIT

## 2017-01-01 PROCEDURE — 40000986 XR CHEST 1 VW

## 2017-01-01 PROCEDURE — 83986 ASSAY PH BODY FLUID NOS: CPT | Performed by: HOSPITALIST

## 2017-01-01 PROCEDURE — 40000275 ZZH STATISTIC RCP TIME EA 10 MIN

## 2017-01-01 PROCEDURE — 99152 MOD SED SAME PHYS/QHP 5/>YRS: CPT

## 2017-01-01 PROCEDURE — 80048 BASIC METABOLIC PNL TOTAL CA: CPT | Performed by: HOSPITALIST

## 2017-01-01 PROCEDURE — 71010 XR CHEST PORT 1 VW: CPT

## 2017-01-01 PROCEDURE — 82803 BLOOD GASES ANY COMBINATION: CPT

## 2017-01-01 PROCEDURE — C9600 PERC DRUG-EL COR STENT SING: HCPCS | Mod: RC

## 2017-01-01 PROCEDURE — 97110 THERAPEUTIC EXERCISES: CPT | Mod: GO | Performed by: OCCUPATIONAL THERAPIST

## 2017-01-01 PROCEDURE — 20000003 ZZH R&B ICU

## 2017-01-01 PROCEDURE — 80048 BASIC METABOLIC PNL TOTAL CA: CPT | Performed by: INTERNAL MEDICINE

## 2017-01-01 PROCEDURE — 27210804 ZZH SHEATH CR3

## 2017-01-01 PROCEDURE — 84484 ASSAY OF TROPONIN QUANT: CPT | Performed by: EMERGENCY MEDICINE

## 2017-01-01 PROCEDURE — 92928 PRQ TCAT PLMT NTRAC ST 1 LES: CPT | Mod: RC | Performed by: INTERNAL MEDICINE

## 2017-01-01 PROCEDURE — 99233 SBSQ HOSP IP/OBS HIGH 50: CPT | Mod: 25 | Performed by: INTERNAL MEDICINE

## 2017-01-01 PROCEDURE — 71250 CT THORAX DX C-: CPT

## 2017-01-01 PROCEDURE — 40000264 ECHO COMPLETE WITH OPTISON

## 2017-01-01 PROCEDURE — 81001 URINALYSIS AUTO W/SCOPE: CPT | Performed by: HOSPITALIST

## 2017-01-01 PROCEDURE — 94640 AIRWAY INHALATION TREATMENT: CPT

## 2017-01-01 PROCEDURE — 93010 ELECTROCARDIOGRAM REPORT: CPT | Mod: 76 | Performed by: INTERNAL MEDICINE

## 2017-01-01 PROCEDURE — 80061 LIPID PANEL: CPT | Performed by: INTERNAL MEDICINE

## 2017-01-01 PROCEDURE — 36415 COLL VENOUS BLD VENIPUNCTURE: CPT | Performed by: INTERNAL MEDICINE

## 2017-01-01 PROCEDURE — 40000281 ZZH STATISTIC TRANSPORT TIME EA 15 MIN

## 2017-01-01 PROCEDURE — C1874 STENT, COATED/COV W/DEL SYS: HCPCS

## 2017-01-01 PROCEDURE — 83605 ASSAY OF LACTIC ACID: CPT | Performed by: INTERNAL MEDICINE

## 2017-01-01 PROCEDURE — A9270 NON-COVERED ITEM OR SERVICE: HCPCS | Mod: GY | Performed by: PHYSICIAN ASSISTANT

## 2017-01-01 PROCEDURE — 82947 ASSAY GLUCOSE BLOOD QUANT: CPT | Performed by: INTERNAL MEDICINE

## 2017-01-01 PROCEDURE — 40000133 ZZH STATISTIC OT WARD VISIT: Performed by: OCCUPATIONAL THERAPIST

## 2017-01-01 PROCEDURE — 99233 SBSQ HOSP IP/OBS HIGH 50: CPT | Performed by: HOSPITALIST

## 2017-01-01 PROCEDURE — 84484 ASSAY OF TROPONIN QUANT: CPT

## 2017-01-01 PROCEDURE — 93458 L HRT ARTERY/VENTRICLE ANGIO: CPT | Mod: 26 | Performed by: INTERNAL MEDICINE

## 2017-01-01 PROCEDURE — 25000132 ZZH RX MED GY IP 250 OP 250 PS 637: Mod: GY | Performed by: EMERGENCY MEDICINE

## 2017-01-01 PROCEDURE — 25800025 ZZH RX 258: Performed by: PHYSICIAN ASSISTANT

## 2017-01-01 PROCEDURE — 4A023N6 MEASUREMENT OF CARDIAC SAMPLING AND PRESSURE, RIGHT HEART, PERCUTANEOUS APPROACH: ICD-10-PCS | Performed by: INTERNAL MEDICINE

## 2017-01-01 PROCEDURE — 25000128 H RX IP 250 OP 636: Performed by: STUDENT IN AN ORGANIZED HEALTH CARE EDUCATION/TRAINING PROGRAM

## 2017-01-01 PROCEDURE — 97166 OT EVAL MOD COMPLEX 45 MIN: CPT | Mod: GO | Performed by: OCCUPATIONAL THERAPIST

## 2017-01-01 PROCEDURE — 93325 DOPPLER ECHO COLOR FLOW MAPG: CPT | Mod: 26 | Performed by: INTERNAL MEDICINE

## 2017-01-01 PROCEDURE — 99207 ZZC NON-BILLABLE SERV PER CHARTING: CPT | Performed by: INTERNAL MEDICINE

## 2017-01-01 PROCEDURE — 27211089 ZZH KIT ACIST INJECTOR CR3

## 2017-01-01 PROCEDURE — 27210946 ZZH KIT HC TOTES DISP CR8

## 2017-01-01 PROCEDURE — 27210744 ZZH CATH CR12

## 2017-01-01 PROCEDURE — 96374 THER/PROPH/DIAG INJ IV PUSH: CPT

## 2017-01-01 PROCEDURE — 82805 BLOOD GASES W/O2 SATURATION: CPT | Performed by: INTERNAL MEDICINE

## 2017-01-01 PROCEDURE — A9270 NON-COVERED ITEM OR SERVICE: HCPCS | Mod: GY | Performed by: EMERGENCY MEDICINE

## 2017-01-01 PROCEDURE — 83615 LACTATE (LD) (LDH) ENZYME: CPT | Performed by: PHYSICIAN ASSISTANT

## 2017-01-01 PROCEDURE — 0W993ZZ DRAINAGE OF RIGHT PLEURAL CAVITY, PERCUTANEOUS APPROACH: ICD-10-PCS | Performed by: RADIOLOGY

## 2017-01-01 PROCEDURE — 99152 MOD SED SAME PHYS/QHP 5/>YRS: CPT | Mod: GC | Performed by: INTERNAL MEDICINE

## 2017-01-01 PROCEDURE — 84484 ASSAY OF TROPONIN QUANT: CPT | Performed by: INTERNAL MEDICINE

## 2017-01-01 PROCEDURE — C1769 GUIDE WIRE: HCPCS

## 2017-01-01 PROCEDURE — 25500064 ZZH RX 255 OP 636: Performed by: INTERNAL MEDICINE

## 2017-01-01 PROCEDURE — 89051 BODY FLUID CELL COUNT: CPT | Performed by: HOSPITALIST

## 2017-01-01 PROCEDURE — 99222 1ST HOSP IP/OBS MODERATE 55: CPT | Mod: 25 | Performed by: INTERNAL MEDICINE

## 2017-01-01 PROCEDURE — 27210795 ZZH PAD DEFIB QUICK CR4

## 2017-01-01 PROCEDURE — 93451 RIGHT HEART CATH: CPT | Mod: XU

## 2017-01-01 PROCEDURE — 27211181 ZZH BALLOON TIP PRESSURE CR5

## 2017-01-01 PROCEDURE — 85027 COMPLETE CBC AUTOMATED: CPT | Performed by: INTERNAL MEDICINE

## 2017-01-01 PROCEDURE — 80048 BASIC METABOLIC PNL TOTAL CA: CPT | Performed by: STUDENT IN AN ORGANIZED HEALTH CARE EDUCATION/TRAINING PROGRAM

## 2017-01-01 PROCEDURE — 99291 CRITICAL CARE FIRST HOUR: CPT | Performed by: INTERNAL MEDICINE

## 2017-01-01 PROCEDURE — 85347 COAGULATION TIME ACTIVATED: CPT

## 2017-01-01 PROCEDURE — 84155 ASSAY OF PROTEIN SERUM: CPT | Performed by: PHYSICIAN ASSISTANT

## 2017-01-01 PROCEDURE — 94640 AIRWAY INHALATION TREATMENT: CPT | Mod: 76

## 2017-01-01 PROCEDURE — 99207 ZZC APP CREDIT; MD BILLING SHARED VISIT: CPT | Performed by: INTERNAL MEDICINE

## 2017-01-01 PROCEDURE — 40000986 XR CHEST PORT 1 VW

## 2017-01-01 PROCEDURE — 97110 THERAPEUTIC EXERCISES: CPT | Mod: GO

## 2017-01-01 PROCEDURE — 93010 ELECTROCARDIOGRAM REPORT: CPT | Mod: 77 | Performed by: INTERNAL MEDICINE

## 2017-01-01 PROCEDURE — 40000141 ZZH STATISTIC PERIPHERAL IV START W/O US GUIDANCE

## 2017-01-01 PROCEDURE — 36569 INSJ PICC 5 YR+ W/O IMAGING: CPT

## 2017-01-01 PROCEDURE — 88305 TISSUE EXAM BY PATHOLOGIST: CPT | Mod: 26 | Performed by: HOSPITALIST

## 2017-01-01 PROCEDURE — 87205 SMEAR GRAM STAIN: CPT | Performed by: HOSPITALIST

## 2017-01-01 PROCEDURE — 40000257 ZZH STATISTIC CONSULT NO CHARGE VASC ACCESS

## 2017-01-01 PROCEDURE — 99207 ZZC CONSULT E&M CHANGED TO INITIAL LEVEL: CPT | Performed by: PHYSICIAN ASSISTANT

## 2017-01-01 PROCEDURE — 4A023N8 MEASUREMENT OF CARDIAC SAMPLING AND PRESSURE, BILATERAL, PERCUTANEOUS APPROACH: ICD-10-PCS | Performed by: INTERNAL MEDICINE

## 2017-01-01 PROCEDURE — 027035Z DILATION OF CORONARY ARTERY, ONE ARTERY WITH TWO DRUG-ELUTING INTRALUMINAL DEVICES, PERCUTANEOUS APPROACH: ICD-10-PCS | Performed by: INTERNAL MEDICINE

## 2017-01-01 PROCEDURE — 85025 COMPLETE CBC W/AUTO DIFF WBC: CPT | Performed by: HOSPITALIST

## 2017-01-01 PROCEDURE — 85027 COMPLETE CBC AUTOMATED: CPT | Performed by: PHYSICIAN ASSISTANT

## 2017-01-01 PROCEDURE — 93010 ELECTROCARDIOGRAM REPORT: CPT | Performed by: INTERNAL MEDICINE

## 2017-01-01 PROCEDURE — 27210219 ZZH KIT SHRLOCK 5FR DBL LUM PWR P

## 2017-01-01 PROCEDURE — 93308 TTE F-UP OR LMTD: CPT

## 2017-01-01 PROCEDURE — 0W9B3ZZ DRAINAGE OF LEFT PLEURAL CAVITY, PERCUTANEOUS APPROACH: ICD-10-PCS | Performed by: PHYSICIAN ASSISTANT

## 2017-01-01 PROCEDURE — 25800025 ZZH RX 258: Performed by: INTERNAL MEDICINE

## 2017-01-01 PROCEDURE — 027034Z DILATION OF CORONARY ARTERY, ONE ARTERY WITH DRUG-ELUTING INTRALUMINAL DEVICE, PERCUTANEOUS APPROACH: ICD-10-PCS | Performed by: INTERNAL MEDICINE

## 2017-01-01 PROCEDURE — 85048 AUTOMATED LEUKOCYTE COUNT: CPT | Performed by: INTERNAL MEDICINE

## 2017-01-01 PROCEDURE — C1725 CATH, TRANSLUMIN NON-LASER: HCPCS

## 2017-01-01 PROCEDURE — 97535 SELF CARE MNGMENT TRAINING: CPT | Mod: GO

## 2017-01-01 PROCEDURE — 85025 COMPLETE CBC W/AUTO DIFF WBC: CPT | Performed by: STUDENT IN AN ORGANIZED HEALTH CARE EDUCATION/TRAINING PROGRAM

## 2017-01-01 PROCEDURE — 97530 THERAPEUTIC ACTIVITIES: CPT | Mod: GO | Performed by: OCCUPATIONAL THERAPIST

## 2017-01-01 PROCEDURE — 83036 HEMOGLOBIN GLYCOSYLATED A1C: CPT | Performed by: INTERNAL MEDICINE

## 2017-01-01 PROCEDURE — 83880 ASSAY OF NATRIURETIC PEPTIDE: CPT | Performed by: INTERNAL MEDICINE

## 2017-01-01 PROCEDURE — 25000132 ZZH RX MED GY IP 250 OP 250 PS 637: Mod: GY

## 2017-01-01 PROCEDURE — 40000239 ZZH STATISTIC VAT ROUNDS

## 2017-01-01 PROCEDURE — 93971 EXTREMITY STUDY: CPT | Mod: LT

## 2017-01-01 PROCEDURE — 99153 MOD SED SAME PHYS/QHP EA: CPT

## 2017-01-01 PROCEDURE — 99232 SBSQ HOSP IP/OBS MODERATE 35: CPT | Performed by: HOSPITALIST

## 2017-01-01 PROCEDURE — 83605 ASSAY OF LACTIC ACID: CPT | Performed by: STUDENT IN AN ORGANIZED HEALTH CARE EDUCATION/TRAINING PROGRAM

## 2017-01-01 PROCEDURE — 99231 SBSQ HOSP IP/OBS SF/LOW 25: CPT | Performed by: INTERNAL MEDICINE

## 2017-01-01 PROCEDURE — 00000155 ZZHCL STATISTIC H-CELL BLOCK W/STAIN: Performed by: HOSPITALIST

## 2017-01-01 PROCEDURE — 85610 PROTHROMBIN TIME: CPT | Performed by: EMERGENCY MEDICINE

## 2017-01-01 PROCEDURE — 85025 COMPLETE CBC W/AUTO DIFF WBC: CPT | Performed by: EMERGENCY MEDICINE

## 2017-01-01 PROCEDURE — 99211 OFF/OP EST MAY X REQ PHY/QHP: CPT

## 2017-01-01 PROCEDURE — 84145 PROCALCITONIN (PCT): CPT | Performed by: HOSPITALIST

## 2017-01-01 PROCEDURE — 83735 ASSAY OF MAGNESIUM: CPT | Performed by: STUDENT IN AN ORGANIZED HEALTH CARE EDUCATION/TRAINING PROGRAM

## 2017-01-01 PROCEDURE — C9606 PERC D-E COR REVASC W AMI S: HCPCS

## 2017-01-01 PROCEDURE — 36415 COLL VENOUS BLD VENIPUNCTURE: CPT | Performed by: STUDENT IN AN ORGANIZED HEALTH CARE EDUCATION/TRAINING PROGRAM

## 2017-01-01 PROCEDURE — 99285 EMERGENCY DEPT VISIT HI MDM: CPT | Mod: 25

## 2017-01-01 PROCEDURE — 80048 BASIC METABOLIC PNL TOTAL CA: CPT | Performed by: EMERGENCY MEDICINE

## 2017-01-01 PROCEDURE — 94660 CPAP INITIATION&MGMT: CPT

## 2017-01-01 PROCEDURE — 87040 BLOOD CULTURE FOR BACTERIA: CPT | Performed by: INTERNAL MEDICINE

## 2017-01-01 PROCEDURE — 25000125 ZZHC RX 250: Performed by: PHYSICIAN ASSISTANT

## 2017-01-01 PROCEDURE — B2111ZZ FLUOROSCOPY OF MULTIPLE CORONARY ARTERIES USING LOW OSMOLAR CONTRAST: ICD-10-PCS | Performed by: INTERNAL MEDICINE

## 2017-01-01 PROCEDURE — 02C03ZZ EXTIRPATION OF MATTER FROM CORONARY ARTERY, ONE ARTERY, PERCUTANEOUS APPROACH: ICD-10-PCS | Performed by: INTERNAL MEDICINE

## 2017-01-01 PROCEDURE — 93321 DOPPLER ECHO F-UP/LMTD STD: CPT | Mod: 26 | Performed by: INTERNAL MEDICINE

## 2017-01-01 PROCEDURE — 87633 RESP VIRUS 12-25 TARGETS: CPT | Performed by: INTERNAL MEDICINE

## 2017-01-01 PROCEDURE — 27210339 ZZH CIRCUIT HUMIDITY W/CPAP BIP

## 2017-01-01 PROCEDURE — 93458 L HRT ARTERY/VENTRICLE ANGIO: CPT

## 2017-01-01 PROCEDURE — 25000128 H RX IP 250 OP 636

## 2017-01-01 PROCEDURE — 99222 1ST HOSP IP/OBS MODERATE 55: CPT | Performed by: PHYSICIAN ASSISTANT

## 2017-01-01 PROCEDURE — 36600 WITHDRAWAL OF ARTERIAL BLOOD: CPT

## 2017-01-01 PROCEDURE — 85018 HEMOGLOBIN: CPT | Performed by: INTERNAL MEDICINE

## 2017-01-01 PROCEDURE — 82565 ASSAY OF CREATININE: CPT

## 2017-01-01 PROCEDURE — 27210910 ZZH NEEDLE CR6

## 2017-01-01 PROCEDURE — 85730 THROMBOPLASTIN TIME PARTIAL: CPT | Performed by: INTERNAL MEDICINE

## 2017-01-01 PROCEDURE — 25000131 ZZH RX MED GY IP 250 OP 636 PS 637: Mod: GY | Performed by: PHYSICIAN ASSISTANT

## 2017-01-01 PROCEDURE — 40000863 ZZH STATISTIC RADIOLOGY XRAY, US, CT, MAR, NM

## 2017-01-01 PROCEDURE — 80076 HEPATIC FUNCTION PANEL: CPT | Performed by: STUDENT IN AN ORGANIZED HEALTH CARE EDUCATION/TRAINING PROGRAM

## 2017-01-01 PROCEDURE — 93306 TTE W/DOPPLER COMPLETE: CPT | Mod: 26 | Performed by: INTERNAL MEDICINE

## 2017-01-01 PROCEDURE — 40000916 ZZH STATISTIC SITTER, NIGHT HOURS

## 2017-01-01 RX ORDER — LIDOCAINE 40 MG/G
CREAM TOPICAL
Status: DISCONTINUED | OUTPATIENT
Start: 2017-01-01 | End: 2017-01-01

## 2017-01-01 RX ORDER — CLOPIDOGREL BISULFATE 75 MG/1
75 TABLET ORAL
Status: DISCONTINUED | OUTPATIENT
Start: 2017-01-01 | End: 2017-01-01 | Stop reason: HOSPADM

## 2017-01-01 RX ORDER — POTASSIUM CHLORIDE 1500 MG/1
20 TABLET, EXTENDED RELEASE ORAL
Status: DISCONTINUED | OUTPATIENT
Start: 2017-01-01 | End: 2017-01-01 | Stop reason: HOSPADM

## 2017-01-01 RX ORDER — NALOXONE HYDROCHLORIDE 0.4 MG/ML
.1-.4 INJECTION, SOLUTION INTRAMUSCULAR; INTRAVENOUS; SUBCUTANEOUS
Status: DISCONTINUED | OUTPATIENT
Start: 2017-01-01 | End: 2017-01-01 | Stop reason: HOSPADM

## 2017-01-01 RX ORDER — LIDOCAINE HYDROCHLORIDE 10 MG/ML
30 INJECTION, SOLUTION EPIDURAL; INFILTRATION; INTRACAUDAL; PERINEURAL
Status: DISCONTINUED | OUTPATIENT
Start: 2017-01-01 | End: 2017-01-01 | Stop reason: HOSPADM

## 2017-01-01 RX ORDER — LISINOPRIL 2.5 MG/1
2.5 TABLET ORAL 2 TIMES DAILY
Status: DISCONTINUED | OUTPATIENT
Start: 2017-01-01 | End: 2017-01-01

## 2017-01-01 RX ORDER — DIGOXIN 125 MCG
125 TABLET ORAL DAILY
Status: DISCONTINUED | OUTPATIENT
Start: 2017-01-01 | End: 2017-01-01

## 2017-01-01 RX ORDER — LORAZEPAM 2 MG/ML
1-2 INJECTION INTRAMUSCULAR EVERY 30 MIN PRN
Status: DISCONTINUED | OUTPATIENT
Start: 2017-01-01 | End: 2017-01-01 | Stop reason: HOSPADM

## 2017-01-01 RX ORDER — AMIODARONE HYDROCHLORIDE 200 MG/1
400 TABLET ORAL 2 TIMES DAILY
Status: DISCONTINUED | OUTPATIENT
Start: 2017-01-01 | End: 2017-01-01

## 2017-01-01 RX ORDER — CEFTRIAXONE 1 G/1
1 INJECTION, POWDER, FOR SOLUTION INTRAMUSCULAR; INTRAVENOUS EVERY 24 HOURS
Status: DISCONTINUED | OUTPATIENT
Start: 2017-01-01 | End: 2017-01-01 | Stop reason: ALTCHOICE

## 2017-01-01 RX ORDER — NIFEDIPINE 10 MG/1
10 CAPSULE ORAL
Status: DISCONTINUED | OUTPATIENT
Start: 2017-01-01 | End: 2017-01-01 | Stop reason: HOSPADM

## 2017-01-01 RX ORDER — SODIUM CHLORIDE 9 MG/ML
INJECTION, SOLUTION INTRAVENOUS CONTINUOUS
Status: ACTIVE | OUTPATIENT
Start: 2017-01-01 | End: 2017-01-01

## 2017-01-01 RX ORDER — NITROGLYCERIN 20 MG/100ML
.07-2 INJECTION INTRAVENOUS CONTINUOUS PRN
Status: DISCONTINUED | OUTPATIENT
Start: 2017-01-01 | End: 2017-01-01 | Stop reason: HOSPADM

## 2017-01-01 RX ORDER — NICARDIPINE HYDROCHLORIDE 2.5 MG/ML
100 INJECTION INTRAVENOUS
Status: DISCONTINUED | OUTPATIENT
Start: 2017-01-01 | End: 2017-01-01 | Stop reason: HOSPADM

## 2017-01-01 RX ORDER — METOPROLOL TARTRATE 25 MG/1
25 TABLET, FILM COATED ORAL 2 TIMES DAILY
Status: DISCONTINUED | OUTPATIENT
Start: 2017-01-01 | End: 2017-01-01

## 2017-01-01 RX ORDER — PRASUGREL 10 MG/1
10-60 TABLET, FILM COATED ORAL
Status: DISCONTINUED | OUTPATIENT
Start: 2017-01-01 | End: 2017-01-01 | Stop reason: HOSPADM

## 2017-01-01 RX ORDER — ATORVASTATIN CALCIUM 40 MG/1
40 TABLET, FILM COATED ORAL DAILY
Status: DISCONTINUED | OUTPATIENT
Start: 2017-01-01 | End: 2017-01-01 | Stop reason: HOSPADM

## 2017-01-01 RX ORDER — MORPHINE SULFATE 2 MG/ML
1-2 INJECTION, SOLUTION INTRAMUSCULAR; INTRAVENOUS EVERY 5 MIN PRN
Status: DISCONTINUED | OUTPATIENT
Start: 2017-01-01 | End: 2017-01-01 | Stop reason: HOSPADM

## 2017-01-01 RX ORDER — ASPIRIN 325 MG
325 TABLET ORAL
Status: DISCONTINUED | OUTPATIENT
Start: 2017-01-01 | End: 2017-01-01 | Stop reason: HOSPADM

## 2017-01-01 RX ORDER — PIPERACILLIN SODIUM, TAZOBACTAM SODIUM 4; .5 G/20ML; G/20ML
4.5 INJECTION, POWDER, LYOPHILIZED, FOR SOLUTION INTRAVENOUS EVERY 6 HOURS
Status: DISCONTINUED | OUTPATIENT
Start: 2017-01-01 | End: 2017-01-01 | Stop reason: HOSPADM

## 2017-01-01 RX ORDER — CEPHALEXIN 500 MG/1
500 CAPSULE ORAL EVERY 12 HOURS
Status: DISCONTINUED | OUTPATIENT
Start: 2017-01-01 | End: 2017-01-01

## 2017-01-01 RX ORDER — HYDRALAZINE HYDROCHLORIDE 20 MG/ML
10-20 INJECTION INTRAMUSCULAR; INTRAVENOUS
Status: DISCONTINUED | OUTPATIENT
Start: 2017-01-01 | End: 2017-01-01 | Stop reason: HOSPADM

## 2017-01-01 RX ORDER — NITROGLYCERIN 5 MG/ML
100-200 VIAL (ML) INTRAVENOUS
Status: DISCONTINUED | OUTPATIENT
Start: 2017-01-01 | End: 2017-01-01 | Stop reason: HOSPADM

## 2017-01-01 RX ORDER — FLUMAZENIL 0.1 MG/ML
0.2 INJECTION, SOLUTION INTRAVENOUS
Status: ACTIVE | OUTPATIENT
Start: 2017-01-01 | End: 2017-01-01

## 2017-01-01 RX ORDER — AMIODARONE HYDROCHLORIDE 200 MG/1
200 TABLET ORAL 2 TIMES DAILY
Status: DISCONTINUED | OUTPATIENT
Start: 2017-01-01 | End: 2017-01-01

## 2017-01-01 RX ORDER — LEVOFLOXACIN 5 MG/ML
750 INJECTION, SOLUTION INTRAVENOUS
Status: DISCONTINUED | OUTPATIENT
Start: 2017-01-01 | End: 2017-01-01 | Stop reason: HOSPADM

## 2017-01-01 RX ORDER — ACETAMINOPHEN 325 MG/1
325-650 TABLET ORAL EVERY 4 HOURS PRN
Status: DISCONTINUED | OUTPATIENT
Start: 2017-01-01 | End: 2017-01-01 | Stop reason: HOSPADM

## 2017-01-01 RX ORDER — LISINOPRIL 2.5 MG/1
2.5 TABLET ORAL DAILY
Status: DISCONTINUED | OUTPATIENT
Start: 2017-01-01 | End: 2017-01-01

## 2017-01-01 RX ORDER — PROMETHAZINE HYDROCHLORIDE 25 MG/ML
6.25-25 INJECTION, SOLUTION INTRAMUSCULAR; INTRAVENOUS EVERY 4 HOURS PRN
Status: DISCONTINUED | OUTPATIENT
Start: 2017-01-01 | End: 2017-01-01 | Stop reason: HOSPADM

## 2017-01-01 RX ORDER — LEVOFLOXACIN 5 MG/ML
750 INJECTION, SOLUTION INTRAVENOUS
Status: DISCONTINUED | OUTPATIENT
Start: 2017-01-01 | End: 2017-01-01

## 2017-01-01 RX ORDER — EPTIFIBATIDE 2 MG/ML
180 INJECTION, SOLUTION INTRAVENOUS EVERY 10 MIN PRN
Status: DISCONTINUED | OUTPATIENT
Start: 2017-01-01 | End: 2017-01-01 | Stop reason: HOSPADM

## 2017-01-01 RX ORDER — FUROSEMIDE 10 MG/ML
60 INJECTION INTRAMUSCULAR; INTRAVENOUS
Status: DISCONTINUED | OUTPATIENT
Start: 2017-01-01 | End: 2017-01-01

## 2017-01-01 RX ORDER — ADENOSINE 3 MG/ML
12-12000 INJECTION, SOLUTION INTRAVENOUS
Status: DISCONTINUED | OUTPATIENT
Start: 2017-01-01 | End: 2017-01-01 | Stop reason: HOSPADM

## 2017-01-01 RX ORDER — CALCIUM CARBONATE 500 MG/1
1 TABLET, CHEWABLE ORAL EVERY OTHER DAY
COMMUNITY

## 2017-01-01 RX ORDER — FUROSEMIDE 10 MG/ML
20-100 INJECTION INTRAMUSCULAR; INTRAVENOUS
Status: DISCONTINUED | OUTPATIENT
Start: 2017-01-01 | End: 2017-01-01 | Stop reason: HOSPADM

## 2017-01-01 RX ORDER — METOPROLOL TARTRATE 1 MG/ML
5 INJECTION, SOLUTION INTRAVENOUS EVERY 5 MIN PRN
Status: DISCONTINUED | OUTPATIENT
Start: 2017-01-01 | End: 2017-01-01 | Stop reason: HOSPADM

## 2017-01-01 RX ORDER — LORAZEPAM 2 MG/ML
.5-2 INJECTION INTRAMUSCULAR EVERY 4 HOURS PRN
Status: DISCONTINUED | OUTPATIENT
Start: 2017-01-01 | End: 2017-01-01 | Stop reason: HOSPADM

## 2017-01-01 RX ORDER — VERAPAMIL HYDROCHLORIDE 2.5 MG/ML
1-2.5 INJECTION, SOLUTION INTRAVENOUS
Status: DISCONTINUED | OUTPATIENT
Start: 2017-01-01 | End: 2017-01-01 | Stop reason: HOSPADM

## 2017-01-01 RX ORDER — FLUMAZENIL 0.1 MG/ML
0.2 INJECTION, SOLUTION INTRAVENOUS
Status: DISCONTINUED | OUTPATIENT
Start: 2017-01-01 | End: 2017-01-01 | Stop reason: HOSPADM

## 2017-01-01 RX ORDER — POTASSIUM CHLORIDE 29.8 MG/ML
20 INJECTION INTRAVENOUS
Status: DISCONTINUED | OUTPATIENT
Start: 2017-01-01 | End: 2017-01-01 | Stop reason: HOSPADM

## 2017-01-01 RX ORDER — NICOTINE POLACRILEX 4 MG
15-30 LOZENGE BUCCAL
Status: DISCONTINUED | OUTPATIENT
Start: 2017-01-01 | End: 2017-01-01 | Stop reason: HOSPADM

## 2017-01-01 RX ORDER — METOPROLOL TARTRATE 1 MG/ML
INJECTION, SOLUTION INTRAVENOUS
Status: DISCONTINUED
Start: 2017-01-01 | End: 2017-01-01

## 2017-01-01 RX ORDER — PROTAMINE SULFATE 10 MG/ML
25-100 INJECTION, SOLUTION INTRAVENOUS EVERY 5 MIN PRN
Status: DISCONTINUED | OUTPATIENT
Start: 2017-01-01 | End: 2017-01-01 | Stop reason: HOSPADM

## 2017-01-01 RX ORDER — EPTIFIBATIDE 2 MG/ML
2 INJECTION, SOLUTION INTRAVENOUS CONTINUOUS PRN
Status: DISCONTINUED | OUTPATIENT
Start: 2017-01-01 | End: 2017-01-01 | Stop reason: HOSPADM

## 2017-01-01 RX ORDER — HEPARIN SODIUM 1000 [USP'U]/ML
1000-10000 INJECTION, SOLUTION INTRAVENOUS; SUBCUTANEOUS EVERY 5 MIN PRN
Status: DISCONTINUED | OUTPATIENT
Start: 2017-01-01 | End: 2017-01-01 | Stop reason: HOSPADM

## 2017-01-01 RX ORDER — PROTAMINE SULFATE 10 MG/ML
1-5 INJECTION, SOLUTION INTRAVENOUS
Status: DISCONTINUED | OUTPATIENT
Start: 2017-01-01 | End: 2017-01-01 | Stop reason: HOSPADM

## 2017-01-01 RX ORDER — METHYLPREDNISOLONE SODIUM SUCCINATE 125 MG/2ML
125 INJECTION, POWDER, LYOPHILIZED, FOR SOLUTION INTRAMUSCULAR; INTRAVENOUS
Status: DISCONTINUED | OUTPATIENT
Start: 2017-01-01 | End: 2017-01-01 | Stop reason: HOSPADM

## 2017-01-01 RX ORDER — MORPHINE SULFATE 2 MG/ML
INJECTION, SOLUTION INTRAMUSCULAR; INTRAVENOUS
Status: COMPLETED
Start: 2017-01-01 | End: 2017-01-01

## 2017-01-01 RX ORDER — NALOXONE HYDROCHLORIDE 0.4 MG/ML
.1-.4 INJECTION, SOLUTION INTRAMUSCULAR; INTRAVENOUS; SUBCUTANEOUS
Status: DISCONTINUED | OUTPATIENT
Start: 2017-01-01 | End: 2017-01-01

## 2017-01-01 RX ORDER — ATROPINE SULFATE 0.1 MG/ML
0.5 INJECTION INTRAVENOUS EVERY 5 MIN PRN
Status: ACTIVE | OUTPATIENT
Start: 2017-01-01 | End: 2017-01-01

## 2017-01-01 RX ORDER — FENTANYL CITRATE 50 UG/ML
25-50 INJECTION, SOLUTION INTRAMUSCULAR; INTRAVENOUS
Status: DISCONTINUED | OUTPATIENT
Start: 2017-01-01 | End: 2017-01-01 | Stop reason: HOSPADM

## 2017-01-01 RX ORDER — FUROSEMIDE 10 MG/ML
40 INJECTION INTRAMUSCULAR; INTRAVENOUS
Status: DISCONTINUED | OUTPATIENT
Start: 2017-01-01 | End: 2017-01-01

## 2017-01-01 RX ORDER — ASPIRIN 81 MG/1
81-324 TABLET, CHEWABLE ORAL
Status: DISCONTINUED | OUTPATIENT
Start: 2017-01-01 | End: 2017-01-01 | Stop reason: HOSPADM

## 2017-01-01 RX ORDER — PIPERACILLIN SODIUM, TAZOBACTAM SODIUM 3; .375 G/15ML; G/15ML
3.38 INJECTION, POWDER, LYOPHILIZED, FOR SOLUTION INTRAVENOUS EVERY 6 HOURS
Status: DISCONTINUED | OUTPATIENT
Start: 2017-01-01 | End: 2017-01-01

## 2017-01-01 RX ORDER — BUPIVACAINE HYDROCHLORIDE 2.5 MG/ML
1-10 INJECTION, SOLUTION EPIDURAL; INFILTRATION; INTRACAUDAL
Status: DISCONTINUED | OUTPATIENT
Start: 2017-01-01 | End: 2017-01-01 | Stop reason: HOSPADM

## 2017-01-01 RX ORDER — ESMOLOL HYDROCHLORIDE 20 MG/ML
50-300 INJECTION, SOLUTION INTRAVENOUS CONTINUOUS
Status: DISCONTINUED | OUTPATIENT
Start: 2017-01-01 | End: 2017-01-01 | Stop reason: HOSPADM

## 2017-01-01 RX ORDER — DEXTROSE MONOHYDRATE 25 G/50ML
25-50 INJECTION, SOLUTION INTRAVENOUS
Status: DISCONTINUED | OUTPATIENT
Start: 2017-01-01 | End: 2017-01-01 | Stop reason: HOSPADM

## 2017-01-01 RX ORDER — FUROSEMIDE 10 MG/ML
40 INJECTION INTRAMUSCULAR; INTRAVENOUS ONCE
Status: COMPLETED | OUTPATIENT
Start: 2017-01-01 | End: 2017-01-01

## 2017-01-01 RX ORDER — FUROSEMIDE 10 MG/ML
40 INJECTION INTRAMUSCULAR; INTRAVENOUS ONCE
Status: DISCONTINUED | OUTPATIENT
Start: 2017-01-01 | End: 2017-01-01

## 2017-01-01 RX ORDER — FENTANYL CITRATE 50 UG/ML
25-50 INJECTION, SOLUTION INTRAMUSCULAR; INTRAVENOUS
Status: ACTIVE | OUTPATIENT
Start: 2017-01-01 | End: 2017-01-01

## 2017-01-01 RX ORDER — IOPAMIDOL 755 MG/ML
175 INJECTION, SOLUTION INTRAVASCULAR ONCE
Status: COMPLETED | OUTPATIENT
Start: 2017-01-01 | End: 2017-01-01

## 2017-01-01 RX ORDER — NALOXONE HYDROCHLORIDE 0.4 MG/ML
.2-.4 INJECTION, SOLUTION INTRAMUSCULAR; INTRAVENOUS; SUBCUTANEOUS
Status: ACTIVE | OUTPATIENT
Start: 2017-01-01 | End: 2017-01-01

## 2017-01-01 RX ORDER — METOPROLOL SUCCINATE 25 MG/1
25 TABLET, EXTENDED RELEASE ORAL 2 TIMES DAILY
Status: DISCONTINUED | OUTPATIENT
Start: 2017-01-01 | End: 2017-01-01

## 2017-01-01 RX ORDER — LORAZEPAM 0.5 MG/1
0.5 TABLET ORAL
Status: DISCONTINUED | OUTPATIENT
Start: 2017-01-01 | End: 2017-01-01 | Stop reason: HOSPADM

## 2017-01-01 RX ORDER — DEXTROSE MONOHYDRATE 25 G/50ML
12.5-5 INJECTION, SOLUTION INTRAVENOUS EVERY 30 MIN PRN
Status: DISCONTINUED | OUTPATIENT
Start: 2017-01-01 | End: 2017-01-01 | Stop reason: HOSPADM

## 2017-01-01 RX ORDER — DOBUTAMINE HYDROCHLORIDE 200 MG/100ML
2-20 INJECTION INTRAVENOUS CONTINUOUS PRN
Status: DISCONTINUED | OUTPATIENT
Start: 2017-01-01 | End: 2017-01-01 | Stop reason: HOSPADM

## 2017-01-01 RX ORDER — SODIUM NITROPRUSSIDE 25 MG/ML
100-200 INJECTION INTRAVENOUS
Status: DISCONTINUED | OUTPATIENT
Start: 2017-01-01 | End: 2017-01-01 | Stop reason: HOSPADM

## 2017-01-01 RX ORDER — POTASSIUM CHLORIDE 1.5 G/1.58G
20 POWDER, FOR SOLUTION ORAL ONCE
Status: COMPLETED | OUTPATIENT
Start: 2017-01-01 | End: 2017-01-01

## 2017-01-01 RX ORDER — PHENYLEPHRINE HCL IN 0.9% NACL 1 MG/10 ML
20-100 SYRINGE (ML) INTRAVENOUS
Status: DISCONTINUED | OUTPATIENT
Start: 2017-01-01 | End: 2017-01-01 | Stop reason: HOSPADM

## 2017-01-01 RX ORDER — ASPIRIN 81 MG/1
81 TABLET ORAL DAILY
Status: DISCONTINUED | OUTPATIENT
Start: 2017-01-01 | End: 2017-01-01 | Stop reason: HOSPADM

## 2017-01-01 RX ORDER — LIDOCAINE HYDROCHLORIDE 10 MG/ML
1-10 INJECTION, SOLUTION INFILTRATION; PERINEURAL
Status: DISCONTINUED | OUTPATIENT
Start: 2017-01-01 | End: 2017-01-01 | Stop reason: HOSPADM

## 2017-01-01 RX ORDER — ONDANSETRON 2 MG/ML
4 INJECTION INTRAMUSCULAR; INTRAVENOUS EVERY 4 HOURS PRN
Status: DISCONTINUED | OUTPATIENT
Start: 2017-01-01 | End: 2017-01-01 | Stop reason: HOSPADM

## 2017-01-01 RX ORDER — METOPROLOL TARTRATE 1 MG/ML
2.5-5 INJECTION, SOLUTION INTRAVENOUS EVERY 4 HOURS PRN
Status: DISCONTINUED | OUTPATIENT
Start: 2017-01-01 | End: 2017-01-01 | Stop reason: HOSPADM

## 2017-01-01 RX ORDER — LIDOCAINE 40 MG/G
CREAM TOPICAL
Status: DISCONTINUED | OUTPATIENT
Start: 2017-01-01 | End: 2017-01-01 | Stop reason: HOSPADM

## 2017-01-01 RX ORDER — ATROPINE SULFATE 0.1 MG/ML
.5-1 INJECTION INTRAVENOUS
Status: DISCONTINUED | OUTPATIENT
Start: 2017-01-01 | End: 2017-01-01 | Stop reason: HOSPADM

## 2017-01-01 RX ORDER — NITROGLYCERIN 5 MG/ML
100-500 VIAL (ML) INTRAVENOUS
Status: DISCONTINUED | OUTPATIENT
Start: 2017-01-01 | End: 2017-01-01 | Stop reason: HOSPADM

## 2017-01-01 RX ORDER — HYDROCODONE BITARTRATE AND ACETAMINOPHEN 5; 325 MG/1; MG/1
1-2 TABLET ORAL EVERY 4 HOURS PRN
Status: DISCONTINUED | OUTPATIENT
Start: 2017-01-01 | End: 2017-01-01

## 2017-01-01 RX ORDER — NITROGLYCERIN 0.4 MG/1
0.4 TABLET SUBLINGUAL EVERY 5 MIN PRN
Status: DISCONTINUED | OUTPATIENT
Start: 2017-01-01 | End: 2017-01-01 | Stop reason: HOSPADM

## 2017-01-01 RX ORDER — TORSEMIDE 20 MG/1
20 TABLET ORAL
Status: DISCONTINUED | OUTPATIENT
Start: 2017-01-01 | End: 2017-01-01

## 2017-01-01 RX ORDER — DOPAMINE HYDROCHLORIDE 160 MG/100ML
2-20 INJECTION, SOLUTION INTRAVENOUS CONTINUOUS PRN
Status: DISCONTINUED | OUTPATIENT
Start: 2017-01-01 | End: 2017-01-01 | Stop reason: HOSPADM

## 2017-01-01 RX ORDER — LIDOCAINE HYDROCHLORIDE 10 MG/ML
30 INJECTION, SOLUTION EPIDURAL; INFILTRATION; INTRACAUDAL; PERINEURAL
Status: DISCONTINUED | OUTPATIENT
Start: 2017-01-01 | End: 2017-01-01

## 2017-01-01 RX ORDER — POTASSIUM CL/LIDO/0.9 % NACL 10MEQ/0.1L
10 INTRAVENOUS SOLUTION, PIGGYBACK (ML) INTRAVENOUS
Status: DISCONTINUED | OUTPATIENT
Start: 2017-01-01 | End: 2017-01-01 | Stop reason: HOSPADM

## 2017-01-01 RX ORDER — MORPHINE SULFATE 2 MG/ML
1-4 INJECTION, SOLUTION INTRAMUSCULAR; INTRAVENOUS EVERY 30 MIN PRN
Status: DISCONTINUED | OUTPATIENT
Start: 2017-01-01 | End: 2017-01-01

## 2017-01-01 RX ORDER — FUROSEMIDE 10 MG/ML
INJECTION INTRAMUSCULAR; INTRAVENOUS
Status: DISCONTINUED
Start: 2017-01-01 | End: 2017-01-01

## 2017-01-01 RX ORDER — NALOXONE HYDROCHLORIDE 0.4 MG/ML
0.4 INJECTION, SOLUTION INTRAMUSCULAR; INTRAVENOUS; SUBCUTANEOUS EVERY 5 MIN PRN
Status: DISCONTINUED | OUTPATIENT
Start: 2017-01-01 | End: 2017-01-01 | Stop reason: HOSPADM

## 2017-01-01 RX ORDER — CLOPIDOGREL BISULFATE 75 MG/1
300-600 TABLET ORAL
Status: DISCONTINUED | OUTPATIENT
Start: 2017-01-01 | End: 2017-01-01 | Stop reason: HOSPADM

## 2017-01-01 RX ORDER — POTASSIUM CHLORIDE 7.45 MG/ML
10 INJECTION INTRAVENOUS
Status: DISCONTINUED | OUTPATIENT
Start: 2017-01-01 | End: 2017-01-01 | Stop reason: HOSPADM

## 2017-01-01 RX ORDER — ASPIRIN 81 MG/1
324 TABLET, CHEWABLE ORAL ONCE
Status: COMPLETED | OUTPATIENT
Start: 2017-01-01 | End: 2017-01-01

## 2017-01-01 RX ORDER — MORPHINE SULFATE 2 MG/ML
1-4 INJECTION, SOLUTION INTRAMUSCULAR; INTRAVENOUS EVERY 10 MIN PRN
Status: DISCONTINUED | OUTPATIENT
Start: 2017-01-01 | End: 2017-01-01 | Stop reason: HOSPADM

## 2017-01-01 RX ORDER — LIDOCAINE HYDROCHLORIDE 10 MG/ML
10 INJECTION, SOLUTION EPIDURAL; INFILTRATION; INTRACAUDAL; PERINEURAL ONCE
Status: COMPLETED | OUTPATIENT
Start: 2017-01-01 | End: 2017-01-01

## 2017-01-01 RX ORDER — LIDOCAINE HYDROCHLORIDE 10 MG/ML
5 INJECTION, SOLUTION EPIDURAL; INFILTRATION; INTRACAUDAL; PERINEURAL ONCE
Status: COMPLETED | OUTPATIENT
Start: 2017-01-01 | End: 2017-01-01

## 2017-01-01 RX ORDER — METHYLPREDNISOLONE SODIUM SUCCINATE 40 MG/ML
40 INJECTION, POWDER, LYOPHILIZED, FOR SOLUTION INTRAMUSCULAR; INTRAVENOUS EVERY 24 HOURS
Status: DISCONTINUED | OUTPATIENT
Start: 2017-01-01 | End: 2017-01-01 | Stop reason: HOSPADM

## 2017-01-01 RX ORDER — EPTIFIBATIDE 2 MG/ML
1 INJECTION, SOLUTION INTRAVENOUS CONTINUOUS PRN
Status: DISCONTINUED | OUTPATIENT
Start: 2017-01-01 | End: 2017-01-01 | Stop reason: HOSPADM

## 2017-01-01 RX ORDER — CEFAZOLIN SODIUM 1 G/3ML
1 INJECTION, POWDER, FOR SOLUTION INTRAMUSCULAR; INTRAVENOUS EVERY 8 HOURS
Status: DISCONTINUED | OUTPATIENT
Start: 2017-01-01 | End: 2017-01-01

## 2017-01-01 RX ORDER — SODIUM CHLORIDE 9 MG/ML
INJECTION, SOLUTION INTRAVENOUS CONTINUOUS
Status: DISCONTINUED | OUTPATIENT
Start: 2017-01-01 | End: 2017-01-01

## 2017-01-01 RX ORDER — IOPAMIDOL 755 MG/ML
45 INJECTION, SOLUTION INTRAVASCULAR ONCE
Status: COMPLETED | OUTPATIENT
Start: 2017-01-01 | End: 2017-01-01

## 2017-01-01 RX ORDER — DIPHENHYDRAMINE HYDROCHLORIDE 50 MG/ML
25-50 INJECTION INTRAMUSCULAR; INTRAVENOUS
Status: DISCONTINUED | OUTPATIENT
Start: 2017-01-01 | End: 2017-01-01 | Stop reason: HOSPADM

## 2017-01-01 RX ORDER — LORAZEPAM 2 MG/ML
0.5 INJECTION INTRAMUSCULAR ONCE
Status: COMPLETED | OUTPATIENT
Start: 2017-01-01 | End: 2017-01-01

## 2017-01-01 RX ORDER — LORAZEPAM 2 MG/ML
0.5 INJECTION INTRAMUSCULAR
Status: DISCONTINUED | OUTPATIENT
Start: 2017-01-01 | End: 2017-01-01 | Stop reason: HOSPADM

## 2017-01-01 RX ORDER — ENALAPRILAT 1.25 MG/ML
1.25-2.5 INJECTION INTRAVENOUS
Status: DISCONTINUED | OUTPATIENT
Start: 2017-01-01 | End: 2017-01-01 | Stop reason: HOSPADM

## 2017-01-01 RX ORDER — NITROGLYCERIN 0.4 MG/1
0.4 TABLET SUBLINGUAL EVERY 5 MIN PRN
Status: DISCONTINUED | OUTPATIENT
Start: 2017-01-01 | End: 2017-01-01

## 2017-01-01 RX ORDER — ACETAMINOPHEN 325 MG/1
325-650 TABLET ORAL EVERY 4 HOURS PRN
Status: DISCONTINUED | OUTPATIENT
Start: 2017-01-01 | End: 2017-01-01

## 2017-01-01 RX ORDER — LIDOCAINE HYDROCHLORIDE 10 MG/ML
1-10 INJECTION, SOLUTION EPIDURAL; INFILTRATION; INTRACAUDAL; PERINEURAL
Status: COMPLETED | OUTPATIENT
Start: 2017-01-01 | End: 2017-01-01

## 2017-01-01 RX ORDER — MAGNESIUM SULFATE HEPTAHYDRATE 40 MG/ML
4 INJECTION, SOLUTION INTRAVENOUS EVERY 4 HOURS PRN
Status: DISCONTINUED | OUTPATIENT
Start: 2017-01-01 | End: 2017-01-01 | Stop reason: HOSPADM

## 2017-01-01 RX ORDER — FUROSEMIDE 10 MG/ML
40 INJECTION INTRAMUSCULAR; INTRAVENOUS 3 TIMES DAILY
Status: DISCONTINUED | OUTPATIENT
Start: 2017-01-01 | End: 2017-01-01

## 2017-01-01 RX ORDER — FUROSEMIDE 10 MG/ML
40 INJECTION INTRAMUSCULAR; INTRAVENOUS DAILY
Status: DISCONTINUED | OUTPATIENT
Start: 2017-01-01 | End: 2017-01-01

## 2017-01-01 RX ORDER — POTASSIUM CHLORIDE 1.5 G/1.58G
20-40 POWDER, FOR SOLUTION ORAL
Status: DISCONTINUED | OUTPATIENT
Start: 2017-01-01 | End: 2017-01-01 | Stop reason: HOSPADM

## 2017-01-01 RX ORDER — HYDROCODONE BITARTRATE AND ACETAMINOPHEN 5; 325 MG/1; MG/1
1-2 TABLET ORAL EVERY 4 HOURS PRN
Status: DISCONTINUED | OUTPATIENT
Start: 2017-01-01 | End: 2017-01-01 | Stop reason: HOSPADM

## 2017-01-01 RX ORDER — ASPIRIN 81 MG/1
81 TABLET ORAL DAILY
Status: DISCONTINUED | OUTPATIENT
Start: 2017-01-01 | End: 2017-01-01

## 2017-01-01 RX ORDER — POTASSIUM CHLORIDE 1500 MG/1
20-40 TABLET, EXTENDED RELEASE ORAL
Status: DISCONTINUED | OUTPATIENT
Start: 2017-01-01 | End: 2017-01-01 | Stop reason: HOSPADM

## 2017-01-01 RX ORDER — IPRATROPIUM BROMIDE AND ALBUTEROL SULFATE 2.5; .5 MG/3ML; MG/3ML
3 SOLUTION RESPIRATORY (INHALATION) EVERY 6 HOURS
Status: DISCONTINUED | OUTPATIENT
Start: 2017-01-01 | End: 2017-01-01 | Stop reason: HOSPADM

## 2017-01-01 RX ADMIN — Medication 12.5 MG: at 09:41

## 2017-01-01 RX ADMIN — SODIUM CHLORIDE: 9 INJECTION, SOLUTION INTRAVENOUS at 06:20

## 2017-01-01 RX ADMIN — PIPERACILLIN AND TAZOBACTAM 4.5 G: 4; .5 INJECTION, POWDER, FOR SOLUTION INTRAVENOUS at 16:03

## 2017-01-01 RX ADMIN — TICAGRELOR 90 MG: 90 TABLET ORAL at 09:14

## 2017-01-01 RX ADMIN — ATORVASTATIN CALCIUM 40 MG: 40 TABLET, FILM COATED ORAL at 09:54

## 2017-01-01 RX ADMIN — DEXTROSE MONOHYDRATE 25 ML: 500 INJECTION PARENTERAL at 07:46

## 2017-01-01 RX ADMIN — LISINOPRIL 2.5 MG: 2.5 TABLET ORAL at 08:27

## 2017-01-01 RX ADMIN — TORSEMIDE 30 MG: 20 TABLET ORAL at 17:26

## 2017-01-01 RX ADMIN — ATORVASTATIN CALCIUM 40 MG: 40 TABLET, FILM COATED ORAL at 09:01

## 2017-01-01 RX ADMIN — CEFAZOLIN 1 G: 1 INJECTION, POWDER, FOR SOLUTION INTRAMUSCULAR; INTRAVENOUS at 04:00

## 2017-01-01 RX ADMIN — FUROSEMIDE 60 MG: 10 INJECTION, SOLUTION INTRAVENOUS at 08:32

## 2017-01-01 RX ADMIN — IPRATROPIUM BROMIDE AND ALBUTEROL SULFATE 3 ML: .5; 3 SOLUTION RESPIRATORY (INHALATION) at 01:53

## 2017-01-01 RX ADMIN — Medication 37.5 MG: at 09:18

## 2017-01-01 RX ADMIN — POTASSIUM CHLORIDE 20 MEQ: 1500 TABLET, EXTENDED RELEASE ORAL at 12:58

## 2017-01-01 RX ADMIN — IOPAMIDOL 45 ML: 755 INJECTION, SOLUTION INTRAVASCULAR at 15:00

## 2017-01-01 RX ADMIN — PIPERACILLIN AND TAZOBACTAM 4.5 G: 4; .5 INJECTION, POWDER, FOR SOLUTION INTRAVENOUS at 23:35

## 2017-01-01 RX ADMIN — LISINOPRIL 2.5 MG: 2.5 TABLET ORAL at 09:14

## 2017-01-01 RX ADMIN — MORPHINE SULFATE 4 MG: 2 INJECTION, SOLUTION INTRAMUSCULAR; INTRAVENOUS at 02:55

## 2017-01-01 RX ADMIN — ASPIRIN 81 MG: 81 TABLET, COATED ORAL at 08:32

## 2017-01-01 RX ADMIN — ASPIRIN 81 MG: 81 TABLET, COATED ORAL at 08:14

## 2017-01-01 RX ADMIN — TICAGRELOR 90 MG: 90 TABLET ORAL at 21:50

## 2017-01-01 RX ADMIN — AMIODARONE HYDROCHLORIDE 400 MG: 200 TABLET ORAL at 08:29

## 2017-01-01 RX ADMIN — IPRATROPIUM BROMIDE AND ALBUTEROL SULFATE 3 ML: .5; 3 SOLUTION RESPIRATORY (INHALATION) at 06:50

## 2017-01-01 RX ADMIN — ATORVASTATIN CALCIUM 40 MG: 40 TABLET, FILM COATED ORAL at 08:14

## 2017-01-01 RX ADMIN — ASPIRIN 81 MG: 81 TABLET, COATED ORAL at 08:26

## 2017-01-01 RX ADMIN — TICAGRELOR 180 MG: 90 TABLET ORAL at 04:00

## 2017-01-01 RX ADMIN — AMIODARONE HYDROCHLORIDE 1 MG/MIN: 50 INJECTION, SOLUTION INTRAVENOUS at 12:37

## 2017-01-01 RX ADMIN — FUROSEMIDE 60 MG: 10 INJECTION, SOLUTION INTRAVENOUS at 16:24

## 2017-01-01 RX ADMIN — LIDOCAINE HYDROCHLORIDE 90 MG: 10 INJECTION, SOLUTION EPIDURAL; INFILTRATION; INTRACAUDAL; PERINEURAL at 14:11

## 2017-01-01 RX ADMIN — LISINOPRIL 2.5 MG: 2.5 TABLET ORAL at 09:42

## 2017-01-01 RX ADMIN — AMIODARONE HYDROCHLORIDE 400 MG: 200 TABLET ORAL at 09:57

## 2017-01-01 RX ADMIN — FUROSEMIDE 40 MG: 10 INJECTION, SOLUTION INTRAVENOUS at 10:26

## 2017-01-01 RX ADMIN — LISINOPRIL 2.5 MG: 2.5 TABLET ORAL at 21:27

## 2017-01-01 RX ADMIN — Medication 12.5 MG: at 09:05

## 2017-01-01 RX ADMIN — IPRATROPIUM BROMIDE AND ALBUTEROL SULFATE 3 ML: .5; 3 SOLUTION RESPIRATORY (INHALATION) at 12:18

## 2017-01-01 RX ADMIN — Medication 37.5 MG: at 21:50

## 2017-01-01 RX ADMIN — ASPIRIN 81 MG: 81 TABLET, COATED ORAL at 08:36

## 2017-01-01 RX ADMIN — CEPHALEXIN 500 MG: 500 CAPSULE ORAL at 21:18

## 2017-01-01 RX ADMIN — PIPERACILLIN AND TAZOBACTAM 4.5 G: 4; .5 INJECTION, POWDER, FOR SOLUTION INTRAVENOUS at 10:11

## 2017-01-01 RX ADMIN — ATORVASTATIN CALCIUM 40 MG: 40 TABLET, FILM COATED ORAL at 08:36

## 2017-01-01 RX ADMIN — SODIUM CHLORIDE 250 ML: 9 INJECTION, SOLUTION INTRAVENOUS at 00:41

## 2017-01-01 RX ADMIN — FUROSEMIDE 40 MG: 10 INJECTION, SOLUTION INTRAVENOUS at 08:00

## 2017-01-01 RX ADMIN — Medication 37.5 MG: at 08:36

## 2017-01-01 RX ADMIN — TICAGRELOR 90 MG: 90 TABLET ORAL at 20:30

## 2017-01-01 RX ADMIN — Medication 37.5 MG: at 20:40

## 2017-01-01 RX ADMIN — IPRATROPIUM BROMIDE AND ALBUTEROL SULFATE 3 ML: .5; 3 SOLUTION RESPIRATORY (INHALATION) at 20:04

## 2017-01-01 RX ADMIN — Medication 37.5 MG: at 21:51

## 2017-01-01 RX ADMIN — TICAGRELOR 90 MG: 90 TABLET ORAL at 20:43

## 2017-01-01 RX ADMIN — METOPROLOL TARTRATE 2.5 MG: 5 INJECTION INTRAVENOUS at 20:48

## 2017-01-01 RX ADMIN — INSULIN ASPART 1 UNITS: 100 INJECTION, SOLUTION INTRAVENOUS; SUBCUTANEOUS at 07:54

## 2017-01-01 RX ADMIN — VANCOMYCIN HYDROCHLORIDE 1750 MG: 5 INJECTION, POWDER, LYOPHILIZED, FOR SOLUTION INTRAVENOUS at 06:13

## 2017-01-01 RX ADMIN — ATORVASTATIN CALCIUM 40 MG: 40 TABLET, FILM COATED ORAL at 08:32

## 2017-01-01 RX ADMIN — LISINOPRIL 2.5 MG: 2.5 TABLET ORAL at 09:57

## 2017-01-01 RX ADMIN — FUROSEMIDE 40 MG: 10 INJECTION, SOLUTION INTRAVENOUS at 09:27

## 2017-01-01 RX ADMIN — IPRATROPIUM BROMIDE AND ALBUTEROL SULFATE 3 ML: .5; 3 SOLUTION RESPIRATORY (INHALATION) at 07:10

## 2017-01-01 RX ADMIN — SULFUR HEXAFLUORIDE 2 ML: KIT at 10:38

## 2017-01-01 RX ADMIN — DEXTROSE MONOHYDRATE 25 ML: 500 INJECTION PARENTERAL at 08:27

## 2017-01-01 RX ADMIN — VANCOMYCIN HYDROCHLORIDE 1750 MG: 5 INJECTION, POWDER, LYOPHILIZED, FOR SOLUTION INTRAVENOUS at 04:56

## 2017-01-01 RX ADMIN — FUROSEMIDE 40 MG: 10 INJECTION, SOLUTION INTRAVENOUS at 09:01

## 2017-01-01 RX ADMIN — NITROGLYCERIN 200 MCG: 5 INJECTION, SOLUTION INTRAVENOUS at 05:32

## 2017-01-01 RX ADMIN — AMIODARONE HYDROCHLORIDE 400 MG: 200 TABLET ORAL at 20:18

## 2017-01-01 RX ADMIN — ASPIRIN 81 MG: 81 TABLET, COATED ORAL at 09:01

## 2017-01-01 RX ADMIN — Medication 37.5 MG: at 20:36

## 2017-01-01 RX ADMIN — CEFAZOLIN 1 G: 1 INJECTION, POWDER, FOR SOLUTION INTRAMUSCULAR; INTRAVENOUS at 12:14

## 2017-01-01 RX ADMIN — AMIODARONE HYDROCHLORIDE 150 MG: 1.5 INJECTION, SOLUTION INTRAVENOUS at 23:22

## 2017-01-01 RX ADMIN — FUROSEMIDE 40 MG: 10 INJECTION, SOLUTION INTRAVENOUS at 16:21

## 2017-01-01 RX ADMIN — FUROSEMIDE 40 MG: 10 INJECTION, SOLUTION INTRAVENOUS at 08:27

## 2017-01-01 RX ADMIN — NITROGLYCERIN 0.4 MG: 0.4 TABLET SUBLINGUAL at 04:04

## 2017-01-01 RX ADMIN — NITROGLYCERIN 0.4 MG: 0.4 TABLET SUBLINGUAL at 03:58

## 2017-01-01 RX ADMIN — MORPHINE SULFATE 4 MG: 2 INJECTION, SOLUTION INTRAMUSCULAR; INTRAVENOUS at 02:32

## 2017-01-01 RX ADMIN — FUROSEMIDE 40 MG: 10 INJECTION, SOLUTION INTRAVENOUS at 16:56

## 2017-01-01 RX ADMIN — METOPROLOL SUCCINATE 25 MG: 25 TABLET, EXTENDED RELEASE ORAL at 08:32

## 2017-01-01 RX ADMIN — ASPIRIN 81 MG: 81 TABLET, COATED ORAL at 09:54

## 2017-01-01 RX ADMIN — FUROSEMIDE 40 MG: 10 INJECTION, SOLUTION INTRAVENOUS at 21:44

## 2017-01-01 RX ADMIN — ACETAMINOPHEN 650 MG: 325 TABLET, FILM COATED ORAL at 22:02

## 2017-01-01 RX ADMIN — TICAGRELOR 90 MG: 90 TABLET ORAL at 09:24

## 2017-01-01 RX ADMIN — BIVALIRUDIN 2 MG/KG/HR: 250 INJECTION, POWDER, LYOPHILIZED, FOR SOLUTION INTRAVENOUS at 04:53

## 2017-01-01 RX ADMIN — Medication 12.5 MG: at 21:18

## 2017-01-01 RX ADMIN — ATORVASTATIN CALCIUM 40 MG: 40 TABLET, FILM COATED ORAL at 09:15

## 2017-01-01 RX ADMIN — AMIODARONE HYDROCHLORIDE 400 MG: 200 TABLET ORAL at 21:51

## 2017-01-01 RX ADMIN — LIDOCAINE HYDROCHLORIDE 50 MG: 10 INJECTION, SOLUTION EPIDURAL; INFILTRATION; INTRACAUDAL; PERINEURAL at 14:00

## 2017-01-01 RX ADMIN — ASPIRIN 81 MG: 81 TABLET, COATED ORAL at 08:28

## 2017-01-01 RX ADMIN — FUROSEMIDE 40 MG: 10 INJECTION, SOLUTION INTRAVENOUS at 16:39

## 2017-01-01 RX ADMIN — LISINOPRIL 2.5 MG: 2.5 TABLET ORAL at 09:54

## 2017-01-01 RX ADMIN — PIPERACILLIN AND TAZOBACTAM 4.5 G: 4; .5 INJECTION, POWDER, FOR SOLUTION INTRAVENOUS at 21:34

## 2017-01-01 RX ADMIN — LEVOFLOXACIN 750 MG: 5 INJECTION, SOLUTION INTRAVENOUS at 10:05

## 2017-01-01 RX ADMIN — METOPROLOL SUCCINATE 25 MG: 25 TABLET, EXTENDED RELEASE ORAL at 21:02

## 2017-01-01 RX ADMIN — TICAGRELOR 90 MG: 90 TABLET ORAL at 08:27

## 2017-01-01 RX ADMIN — AMIODARONE HYDROCHLORIDE 200 MG: 200 TABLET ORAL at 09:42

## 2017-01-01 RX ADMIN — TICAGRELOR 90 MG: 90 TABLET ORAL at 14:48

## 2017-01-01 RX ADMIN — AMIODARONE HYDROCHLORIDE 200 MG: 200 TABLET ORAL at 08:27

## 2017-01-01 RX ADMIN — ASPIRIN 81 MG: 81 TABLET, COATED ORAL at 09:15

## 2017-01-01 RX ADMIN — TICAGRELOR 90 MG: 90 TABLET ORAL at 20:36

## 2017-01-01 RX ADMIN — TICAGRELOR 90 MG: 90 TABLET ORAL at 08:32

## 2017-01-01 RX ADMIN — FUROSEMIDE 40 MG: 10 INJECTION, SOLUTION INTRAVENOUS at 21:30

## 2017-01-01 RX ADMIN — TICAGRELOR 90 MG: 90 TABLET ORAL at 21:51

## 2017-01-01 RX ADMIN — CEPHALEXIN 500 MG: 500 CAPSULE ORAL at 21:51

## 2017-01-01 RX ADMIN — IPRATROPIUM BROMIDE AND ALBUTEROL SULFATE 3 ML: .5; 3 SOLUTION RESPIRATORY (INHALATION) at 13:09

## 2017-01-01 RX ADMIN — METHYLPREDNISOLONE SODIUM SUCCINATE 40 MG: 40 INJECTION, POWDER, FOR SOLUTION INTRAMUSCULAR; INTRAVENOUS at 19:15

## 2017-01-01 RX ADMIN — FUROSEMIDE 40 MG: 10 INJECTION, SOLUTION INTRAVENOUS at 09:59

## 2017-01-01 RX ADMIN — SODIUM CHLORIDE 50 ML/HR: 234 INJECTION INTRAMUSCULAR; INTRAVENOUS; SUBCUTANEOUS at 13:15

## 2017-01-01 RX ADMIN — METOPROLOL TARTRATE 25 MG: 25 TABLET, FILM COATED ORAL at 19:31

## 2017-01-01 RX ADMIN — ASPIRIN 81 MG: 81 TABLET, COATED ORAL at 10:00

## 2017-01-01 RX ADMIN — DEXTROSE MONOHYDRATE 50 ML: 500 INJECTION PARENTERAL at 01:30

## 2017-01-01 RX ADMIN — SODIUM CHLORIDE 500 ML: 9 INJECTION, SOLUTION INTRAVENOUS at 23:25

## 2017-01-01 RX ADMIN — PIPERACILLIN AND TAZOBACTAM 4.5 G: 4; .5 INJECTION, POWDER, FOR SOLUTION INTRAVENOUS at 04:50

## 2017-01-01 RX ADMIN — TICAGRELOR 90 MG: 90 TABLET ORAL at 21:26

## 2017-01-01 RX ADMIN — NITROGLYCERIN 0.4 MG: 0.4 TABLET SUBLINGUAL at 03:53

## 2017-01-01 RX ADMIN — DEXTROSE 30 G: 15 GEL ORAL at 08:29

## 2017-01-01 RX ADMIN — AMIODARONE HYDROCHLORIDE 400 MG: 200 TABLET ORAL at 18:38

## 2017-01-01 RX ADMIN — PIPERACILLIN AND TAZOBACTAM 4.5 G: 4; .5 INJECTION, POWDER, FOR SOLUTION INTRAVENOUS at 16:22

## 2017-01-01 RX ADMIN — METOPROLOL SUCCINATE 25 MG: 25 TABLET, EXTENDED RELEASE ORAL at 09:16

## 2017-01-01 RX ADMIN — TICAGRELOR 90 MG: 90 TABLET ORAL at 09:06

## 2017-01-01 RX ADMIN — METOPROLOL SUCCINATE 25 MG: 25 TABLET, EXTENDED RELEASE ORAL at 21:50

## 2017-01-01 RX ADMIN — ASPIRIN 81 MG: 81 TABLET, COATED ORAL at 08:40

## 2017-01-01 RX ADMIN — AMIODARONE HYDROCHLORIDE 200 MG: 200 TABLET ORAL at 09:01

## 2017-01-01 RX ADMIN — IPRATROPIUM BROMIDE AND ALBUTEROL SULFATE 3 ML: .5; 3 SOLUTION RESPIRATORY (INHALATION) at 00:01

## 2017-01-01 RX ADMIN — AMIODARONE HYDROCHLORIDE 200 MG: 200 TABLET ORAL at 21:18

## 2017-01-01 RX ADMIN — ASPIRIN 81 MG: 81 TABLET, COATED ORAL at 09:25

## 2017-01-01 RX ADMIN — LISINOPRIL 2.5 MG: 2.5 TABLET ORAL at 09:05

## 2017-01-01 RX ADMIN — TICAGRELOR 90 MG: 90 TABLET ORAL at 08:29

## 2017-01-01 RX ADMIN — ATORVASTATIN CALCIUM 40 MG: 40 TABLET, FILM COATED ORAL at 09:06

## 2017-01-01 RX ADMIN — LISINOPRIL 2.5 MG: 2.5 TABLET ORAL at 08:36

## 2017-01-01 RX ADMIN — LISINOPRIL 2.5 MG: 2.5 TABLET ORAL at 21:50

## 2017-01-01 RX ADMIN — POTASSIUM CHLORIDE 40 MEQ: 1500 TABLET, EXTENDED RELEASE ORAL at 06:28

## 2017-01-01 RX ADMIN — AMIODARONE HYDROCHLORIDE 400 MG: 200 TABLET ORAL at 08:36

## 2017-01-01 RX ADMIN — DEXMEDETOMIDINE HYDROCHLORIDE 0.2 MCG/KG/HR: 100 INJECTION, SOLUTION INTRAVENOUS at 17:02

## 2017-01-01 RX ADMIN — POTASSIUM CHLORIDE 20 MEQ: 1.5 POWDER, FOR SOLUTION ORAL at 16:47

## 2017-01-01 RX ADMIN — TICAGRELOR 90 MG: 90 TABLET ORAL at 09:01

## 2017-01-01 RX ADMIN — ASPIRIN 81 MG 324 MG: 81 TABLET ORAL at 03:52

## 2017-01-01 RX ADMIN — TICAGRELOR 90 MG: 90 TABLET ORAL at 08:36

## 2017-01-01 RX ADMIN — AMIODARONE HYDROCHLORIDE 200 MG: 200 TABLET ORAL at 21:44

## 2017-01-01 RX ADMIN — LISINOPRIL 2.5 MG: 2.5 TABLET ORAL at 08:32

## 2017-01-01 RX ADMIN — ATORVASTATIN CALCIUM 40 MG: 40 TABLET, FILM COATED ORAL at 09:42

## 2017-01-01 RX ADMIN — TICAGRELOR 90 MG: 90 TABLET ORAL at 21:31

## 2017-01-01 RX ADMIN — LISINOPRIL 2.5 MG: 2.5 TABLET ORAL at 21:51

## 2017-01-01 RX ADMIN — DEXTROSE MONOHYDRATE 50 ML: 500 INJECTION PARENTERAL at 08:46

## 2017-01-01 RX ADMIN — MIDAZOLAM HYDROCHLORIDE 0.5 MG: 1 INJECTION, SOLUTION INTRAMUSCULAR; INTRAVENOUS at 04:49

## 2017-01-01 RX ADMIN — INSULIN ASPART 1 UNITS: 100 INJECTION, SOLUTION INTRAVENOUS; SUBCUTANEOUS at 18:38

## 2017-01-01 RX ADMIN — ATORVASTATIN CALCIUM 40 MG: 40 TABLET, FILM COATED ORAL at 08:27

## 2017-01-01 RX ADMIN — TICAGRELOR 90 MG: 90 TABLET ORAL at 16:35

## 2017-01-01 RX ADMIN — AMIODARONE HYDROCHLORIDE 1 MG/MIN: 50 INJECTION, SOLUTION INTRAVENOUS at 00:14

## 2017-01-01 RX ADMIN — SODIUM CHLORIDE: 9 INJECTION, SOLUTION INTRAVENOUS at 16:11

## 2017-01-01 RX ADMIN — LISINOPRIL 2.5 MG: 2.5 TABLET ORAL at 08:40

## 2017-01-01 RX ADMIN — FUROSEMIDE 40 MG: 10 INJECTION, SOLUTION INTRAVENOUS at 09:14

## 2017-01-01 RX ADMIN — CEFTRIAXONE 1 G: 1 INJECTION, POWDER, FOR SOLUTION INTRAMUSCULAR; INTRAVENOUS at 10:11

## 2017-01-01 RX ADMIN — METOPROLOL TARTRATE 25 MG: 25 TABLET, FILM COATED ORAL at 09:54

## 2017-01-01 RX ADMIN — LORAZEPAM 0.5 MG: 2 INJECTION INTRAMUSCULAR; INTRAVENOUS at 00:52

## 2017-01-01 RX ADMIN — CEPHALEXIN 500 MG: 500 CAPSULE ORAL at 21:54

## 2017-01-01 RX ADMIN — TICAGRELOR 90 MG: 90 TABLET ORAL at 05:51

## 2017-01-01 RX ADMIN — CEFAZOLIN 1 G: 1 INJECTION, POWDER, FOR SOLUTION INTRAMUSCULAR; INTRAVENOUS at 20:18

## 2017-01-01 RX ADMIN — ATORVASTATIN CALCIUM 40 MG: 40 TABLET, FILM COATED ORAL at 08:40

## 2017-01-01 RX ADMIN — TICAGRELOR 90 MG: 90 TABLET ORAL at 10:01

## 2017-01-01 RX ADMIN — TICAGRELOR 90 MG: 90 TABLET ORAL at 21:27

## 2017-01-01 RX ADMIN — LISINOPRIL 2.5 MG: 2.5 TABLET ORAL at 09:24

## 2017-01-01 RX ADMIN — POTASSIUM CHLORIDE 20 MEQ: 1.5 POWDER, FOR SOLUTION ORAL at 18:31

## 2017-01-01 RX ADMIN — TICAGRELOR 90 MG: 90 TABLET ORAL at 21:44

## 2017-01-01 RX ADMIN — IPRATROPIUM BROMIDE AND ALBUTEROL SULFATE 3 ML: .5; 3 SOLUTION RESPIRATORY (INHALATION) at 19:46

## 2017-01-01 RX ADMIN — HEPARIN SODIUM 4500 UNITS: 1000 INJECTION, SOLUTION INTRAVENOUS; SUBCUTANEOUS at 04:04

## 2017-01-01 RX ADMIN — FUROSEMIDE 60 MG: 10 INJECTION, SOLUTION INTRAVENOUS at 15:34

## 2017-01-01 RX ADMIN — FUROSEMIDE 40 MG: 10 INJECTION, SOLUTION INTRAVENOUS at 15:28

## 2017-01-01 RX ADMIN — AMIODARONE HYDROCHLORIDE 200 MG: 200 TABLET ORAL at 21:27

## 2017-01-01 RX ADMIN — METOPROLOL TARTRATE 25 MG: 25 TABLET, FILM COATED ORAL at 21:31

## 2017-01-01 RX ADMIN — Medication 49.4 MG: at 04:53

## 2017-01-01 RX ADMIN — PIPERACILLIN AND TAZOBACTAM 4.5 G: 4; .5 INJECTION, POWDER, FOR SOLUTION INTRAVENOUS at 03:25

## 2017-01-01 RX ADMIN — LORAZEPAM 0.5 MG: 0.5 TABLET ORAL at 21:34

## 2017-01-01 RX ADMIN — METOPROLOL TARTRATE 25 MG: 25 TABLET, FILM COATED ORAL at 08:26

## 2017-01-01 RX ADMIN — CEFAZOLIN 1 G: 1 INJECTION, POWDER, FOR SOLUTION INTRAMUSCULAR; INTRAVENOUS at 14:16

## 2017-01-01 RX ADMIN — Medication 6000 UNITS: at 14:27

## 2017-01-01 RX ADMIN — LIDOCAINE HYDROCHLORIDE 100 MG: 10 INJECTION, SOLUTION EPIDURAL; INFILTRATION; INTRACAUDAL; PERINEURAL at 14:35

## 2017-01-01 RX ADMIN — Medication 3 MG: at 21:34

## 2017-01-01 RX ADMIN — LEVOFLOXACIN 750 MG: 5 INJECTION, SOLUTION INTRAVENOUS at 22:20

## 2017-01-01 RX ADMIN — METOPROLOL TARTRATE 25 MG: 25 TABLET, FILM COATED ORAL at 08:40

## 2017-01-01 RX ADMIN — IOPAMIDOL 175 ML: 755 INJECTION, SOLUTION INTRAVASCULAR at 06:00

## 2017-01-01 RX ADMIN — METOPROLOL SUCCINATE 25 MG: 25 TABLET, EXTENDED RELEASE ORAL at 10:00

## 2017-01-01 RX ADMIN — Medication 37.5 MG: at 08:14

## 2017-01-01 RX ADMIN — AMIODARONE HYDROCHLORIDE 150 MG: 1.5 INJECTION, SOLUTION INTRAVENOUS at 16:59

## 2017-01-01 RX ADMIN — FUROSEMIDE 40 MG: 10 INJECTION, SOLUTION INTRAVENOUS at 18:31

## 2017-01-01 RX ADMIN — ATORVASTATIN CALCIUM 40 MG: 40 TABLET, FILM COATED ORAL at 08:29

## 2017-01-01 RX ADMIN — ATORVASTATIN CALCIUM 40 MG: 40 TABLET, FILM COATED ORAL at 09:24

## 2017-01-01 RX ADMIN — Medication 37.5 MG: at 20:42

## 2017-01-01 RX ADMIN — CEPHALEXIN 500 MG: 500 CAPSULE ORAL at 10:05

## 2017-01-01 RX ADMIN — ASPIRIN 81 MG: 81 TABLET, COATED ORAL at 09:06

## 2017-01-01 RX ADMIN — TICAGRELOR 90 MG: 90 TABLET ORAL at 21:34

## 2017-01-01 RX ADMIN — AMIODARONE HYDROCHLORIDE 400 MG: 200 TABLET ORAL at 09:15

## 2017-01-01 RX ADMIN — LIDOCAINE HYDROCHLORIDE 100 MG: 10 INJECTION, SOLUTION EPIDURAL; INFILTRATION; INTRACAUDAL; PERINEURAL at 04:49

## 2017-01-01 RX ADMIN — INSULIN ASPART 1 UNITS: 100 INJECTION, SOLUTION INTRAVENOUS; SUBCUTANEOUS at 12:18

## 2017-01-01 RX ADMIN — AMIODARONE HYDROCHLORIDE 0.5 MG/MIN: 50 INJECTION, SOLUTION INTRAVENOUS at 04:47

## 2017-01-01 RX ADMIN — TICAGRELOR 90 MG: 90 TABLET ORAL at 09:42

## 2017-01-01 RX ADMIN — LISINOPRIL 2.5 MG: 2.5 TABLET ORAL at 21:02

## 2017-01-01 RX ADMIN — ASPIRIN 81 MG: 81 TABLET, COATED ORAL at 17:24

## 2017-01-01 RX ADMIN — TICAGRELOR 90 MG: 90 TABLET ORAL at 21:17

## 2017-01-01 RX ADMIN — CEPHALEXIN 500 MG: 500 CAPSULE ORAL at 11:35

## 2017-01-01 RX ADMIN — PIPERACILLIN AND TAZOBACTAM 4.5 G: 4; .5 INJECTION, POWDER, FOR SOLUTION INTRAVENOUS at 10:04

## 2017-01-01 RX ADMIN — SODIUM CHLORIDE 50 ML/HR: 234 INJECTION INTRAMUSCULAR; INTRAVENOUS; SUBCUTANEOUS at 10:05

## 2017-01-01 RX ADMIN — TICAGRELOR 90 MG: 90 TABLET ORAL at 19:42

## 2017-01-01 RX ADMIN — ASPIRIN 81 MG: 81 TABLET, COATED ORAL at 09:42

## 2017-01-01 RX ADMIN — MIDAZOLAM HYDROCHLORIDE 0.5 MG: 1 INJECTION, SOLUTION INTRAMUSCULAR; INTRAVENOUS at 05:13

## 2017-01-01 RX ADMIN — METOPROLOL TARTRATE 25 MG: 25 TABLET, FILM COATED ORAL at 20:08

## 2017-01-01 RX ADMIN — TICAGRELOR 90 MG: 90 TABLET ORAL at 08:14

## 2017-01-01 RX ADMIN — TICAGRELOR 90 MG: 90 TABLET ORAL at 21:54

## 2017-01-01 RX ADMIN — TORSEMIDE 30 MG: 20 TABLET ORAL at 08:28

## 2017-01-01 RX ADMIN — ATORVASTATIN CALCIUM 40 MG: 40 TABLET, FILM COATED ORAL at 10:00

## 2017-01-01 RX ADMIN — AMIODARONE HYDROCHLORIDE 200 MG: 200 TABLET ORAL at 09:25

## 2017-01-01 RX ADMIN — AMIODARONE HYDROCHLORIDE 0.5 MG/MIN: 50 INJECTION, SOLUTION INTRAVENOUS at 01:57

## 2017-01-01 RX ADMIN — METOPROLOL SUCCINATE 25 MG: 25 TABLET, EXTENDED RELEASE ORAL at 21:27

## 2017-01-01 RX ADMIN — METHYLPREDNISOLONE SODIUM SUCCINATE 40 MG: 40 INJECTION, POWDER, FOR SOLUTION INTRAMUSCULAR; INTRAVENOUS at 17:45

## 2017-01-01 RX ADMIN — MORPHINE SULFATE 4 MG: 2 INJECTION, SOLUTION INTRAMUSCULAR; INTRAVENOUS at 02:43

## 2017-01-01 RX ADMIN — FENTANYL CITRATE 25 MCG: 50 INJECTION, SOLUTION INTRAMUSCULAR; INTRAVENOUS at 04:48

## 2017-01-01 ASSESSMENT — PAIN DESCRIPTION - DESCRIPTORS: DESCRIPTORS: SHARP

## 2017-01-01 ASSESSMENT — ENCOUNTER SYMPTOMS
FEVER: 0
COUGH: 0
DIAPHORESIS: 1
NAUSEA: 1

## 2017-01-01 ASSESSMENT — ACTIVITIES OF DAILY LIVING (ADL)
PREVIOUS_RESPONSIBILITIES: MEAL PREP;HOUSEKEEPING;LAUNDRY;SHOPPING;FINANCES;DRIVING
WHICH_OF_THE_ABOVE_FUNCTIONAL_RISKS_HAD_A_RECENT_ONSET_OR_CHANGE?: AMBULATION

## 2017-04-26 NOTE — H&P
History and Physical  Matias Morel MRN: [id number]  1935  Date of Admission:4/26/2017  Primary care provider: Doctor, None  ___________________________________  CC:  Anterior STEMI  History is obtained from the patient and electronic health record    HPI:  Matias Morel is a 81 year old male with diet controlled diabetes and HL presents with an anterior STEMI. He says chest pain started about 10 pm last night when he was about going to bed and did not get better. He called 911 when he could not sleep. He denies previous similar episode. He is otherwise active. He does not have any other major medical problems apart from a history of osteoporosis.  He does not have any family around and says we do not need to contact family.    PMH:  Past Medical History:   Diagnosis Date     Hypertension      PSH:  Past Surgical History:   Procedure Laterality Date     GENITOURINARY SURGERY       SHx:  Social History   Substance Use Topics     Smoking status: Never Smoker     Smokeless tobacco: Not on file     Alcohol use No     Wt Readings from Last 3 Encounters:   04/26/17 65.8 kg (145 lb)   01/13/10 69.4 kg (153 lb)   09/23/09 70.3 kg (155 lb)       FHx:  No family history on file.    Allergies:  No Known Allergies    Medications:  Prescriptions Prior to Admission   Medication Sig Dispense Refill Last Dose     LISINOPRIL 2.5 MG OR TABS ONE TABLET DAILY 90 tab 1 4/25/2017 at Unknown time     SIMVASTATIN 20 MG OR TABS ONE TABLET DAILY IN THE EVENING 90 tab 3 Unknown at Unknown time     IBUPROFEN 400 MG OR TABS tid 30 0 More than a month at Unknown time     ASPIRIN 81 MG OR TABS ONE DAILY 100 3 Past Week at Unknown time     CALCIUM + D 600-200 MG-UNIT OR TABS 1 TABLET bid 120 0 Unknown at Unknown time     FOSAMAX 70 MG OR TABS ONE TABLET WEEKLY 12 3 Past Week at Unknown time     VITAMIN B-12 1000 MCG/ML IJ SOLN 1000 MCG ONCE MONTHLY BY INTRAMUSCULAR INJECTION 1 month 12      ACCU-CHEK SHARMIN VI STRP test 1 time per day 1  month prn      ACCU-CHEK SHARMIN VI SOLN use as directed and discard after 3 months 1 prn      ACCU-CHEK MULTICLIX LANCETS MISC test 1 time per day 1month prn      AVODART 0.5 MG OR CAPS 1 CAPSULE DAILY 30 3 Unknown at Unknown time     ROS:  Negative except as noted in HPI.    PHYSICAL EXAM:  Vitals:  Temp:  [98  F (36.7  C)] 98  F (36.7  C)  Pulse:  [101] 101  Heart Rate:  [72-88] 75  Resp:  [18] 18  BP: (111-164)/() 131/86  SpO2:  [89 %-97 %] 96 %  General: In bed, in NAD  HEENT: EOMI, PERRLA, no scleral icterus or injection  CARDIAC: RRR, no m/r/g appreciated. Peripheral pulses 2+  RESP: CTAB, no wheezes, rhonchi or crackles appreciated.  GI: NABS, NT/ND, no guarding or rebound  EXTREMITIES: NO LE edema, pulses 2+  SKIN: No acute lesions appreciated  NEURO: Alert and oriented X3, CN II-XII grossly intact, no focal neurological deficits noted    DIAGNOSTIC STUDIES  CMP  Recent Labs  Lab 17  0400 17  0340   NA  --  139   POTASSIUM  --  3.8   CHLORIDE  --  103   CO2  --  28   ANIONGAP  --  8   GLC  --  187*   BUN  --  13   CR  --  0.83   GFRESTIMATED 81 89   GFRESTBLACK >90 >90African American GFR Calc   ROBLES  --  8.8     CBC  Recent Labs  Lab 17  0340   WBC 14.1*   RBC 5.20   HGB 15.6   HCT 47.6   MCV 92   MCH 30.0   MCHC 32.8   RDW 15.2*        INR  Recent Labs  Lab 17  0340   INR 0.96     Troponins: 29    EKG/strip results:  Anterior ST elevation with elevation also noted in lead I and aVL    Cardiac Imagin2017  CORONARY ANGIOGRAM:   1. Both coronary arteries arise from their respective cusps.  2. Right dominant.  3. LM has mild (10%) disease.   4. LAD is a type IV vessel and gives rise to septal perforators, small to medium sized D1 and D2. There is a 100% thrombotic occlusion of the prox LAD. After reperfusion, we note diffuse disease in the LAD down to the mid segment. The distal LAD has mild (25%) diffuse disease.   5. LCX gives rise to OM1 and OM2 vessels.  The pLCx  has mild (<25%) disease. The ostial OM1 and OM2 vessels also have mild (30%) disease.    6. RCA gives rise to PL branches and supplies PDA. The pRCA has a 70-80% stenosis, there is mild (20%) diffuse disease in the mid to distal RCA, the rPDA and rPL branches.      LEFT HEART CATHETERIZATION:  1. LVEDP 21 mmHg.  2. No gradient across the aortic valve on pull back.    HEMODYNAMICS:  BSA 1.74  1. HR 80 bpm  2. BP 90/49 mmHg    PERCUTANEOUS CORONARY INTERVENTION:  1. LAD Lesion:  A 6Fr XB3.5 guide catheter was positioned at the ostium of the LM. Angiomax bolus and continuous infusion was administered. A runthrough wire was advanced across the prix LAD lesion and positioned in the distal LAD.  Thrombus aspiration was performed using a WaveSyndicate LP catheter. A 2.5x12mm balloon was used to pre-dilate the lesion. With the aid of a guideliner, two overlapping stents were deployed in the prox and mid LAD from distal to proximal, a 2.61z66yh and 3.0x24mm Promus Premier MARILYN. The stents were post-dilated with a 3.67u16ph NC balloon.  Final angiography showed no evidence of perforation or dissection with residual stenosis of 0% and ADRIAN 3 flow. There was ADRIAN 1 flow in the small to medium sized D1.  No complications.    ASSESSMENT:  1. Anterior STEMI with 100% thorm,botic occlusion of the prox LAD   2. Two vessel coronary artery disease (LAD and prox RCA)  3. Successful Percutaneous coronary intervention of the prox and mid LAD using overlapping 2.05x04le and 3.0x24mm Promus Premier MARILYN, post dilated using a 3.25mm NC balloon. This was done after thrombus aspiration  4. Left ventricle with LVEDP of 21 mmHg, no evidence of aortic stenosis.  5. Sheath was secured in place.    PLAN:  1. Aspirin 81 mg po daily lifelong.  2. Ticagrelor 90 mg po bid for at least 1 year uninterrupted.  3. Bedrest per protocol.  4. Admit   5. Continued medical management and lifestyle modification for cardiovascular risk factor optimization.   6. Planned  staged PCI of prox RCA    FEN:   Cardiac diet     Code Status: Full    Discussed with Dr. Malick Dacosta MD,MPH [phone]  Cardiovascular Disease Fellow     I agree with Dr. Dacosta's assessment and plan

## 2017-04-26 NOTE — ED NOTES
Comes in with chest pain/pressure  Sudden onset 10 pm   At that time was sweaty  Nauseated   Tried to make self vomit to see if that would help  Most pain on left side  6/10 does not radiate  Here for eval  Fingers bluish  Poor circulation  Pt states they have  Been that why for a while

## 2017-04-26 NOTE — ED PROVIDER NOTES
History     Chief Complaint:   Chest Pain    HPI   Matias Morel is a 81 year old male with history of HTN, HLD, DM II who presents for evaluation of constant substernal to mid sternal chest pain which began around 10 PM last night (4/25).  He reports he initially thought his pain was heartburn and took Tums without relief.  The patient states his pain has been steadily worsening to a 6/10 in severity with accompanying diaphoresis and nausea and therefore presents to the ED.  He denies radiation of his pain and reports he has never had similar pain previously.  The patient denies any cardiac history.  He has not taken aspirin today.  He denies fever, cough, bloody nose, or taking Viagra recently.      Cardiac Risk Factors:  CAD:    -  Hypertension:   +  Hyperlipidemia:  +  Diabetes:   +  Tobacco use:   -  Gender:   M  Age:    81  Familial Hx of CAD:  -      Allergies:  NKDA     Medications:    Lisinopril  Aspirin  Fosamax  Simvastatin  Calcium   Vitamin D  Avodart      Past Medical History:    HTN  HLD  Type II Diabetes  Osteoporosis  BPH    Past Surgical History:     Surgery     Family History:  History reviewed.  No significant family history.     Social History:  Relationship status: Single  The patient denies smoking.   The patient denies alcohol use.   The patient presents alone.     Review of Systems   Constitutional: Positive for diaphoresis. Negative for fever.   Respiratory: Negative for cough.    Cardiovascular: Positive for chest pain.   Gastrointestinal: Positive for nausea.   All other systems reviewed and are negative.      Physical Exam     Patient Vitals for the past 24 hrs:   BP Temp Pulse Heart Rate Resp SpO2 Weight   04/26/17 0412 - - - 75 - 96 % -   04/26/17 0405 131/86 - - 72 - 97 % -   04/26/17 0400 (!) 111/94 - - 74 - 96 % -   04/26/17 0355 135/85 - - 88 - 93 % -   04/26/17 0350 - - - - - (!) 89 % -   04/26/17 0334 (!) 164/100 98  F (36.7  C) 101 - 18 - 65.8 kg (145 lb)       Physical  Exam  Nursing note and vitals reviewed.  Constitutional: Cooperative. Appears pale.   HENT:   Mouth/Throat: Moist mucous membranes.   Eyes: EOMI, nonicteric sclera  Cardiovascular: Normal rate, regular rhythm, no murmurs, rubs, or gallops  Pulmonary/Chest: Effort normal and breath sounds normal. No respiratory distress. No wheezes. No rales.   Abdominal: Soft. Nontender, nondistended, no guarding or rigidity. BS present.   Musculoskeletal: Normal range of motion.   Neurological: Alert. Moves all extremities spontaneously.   Skin: Skin is warm and dry. No rash noted.   Psychiatric: Normal mood and affect.       Emergency Department Course     ECG @ 0343  Indication: Chest Pain  Rate 85 bpm.   AR interval 152 ms.   QRS duration 92 ms.   QT/QTc 388/461 ms.   P-R-T axes 59.  Notes: Sinus rhythm with marked sinus arrhythmia.  Anteroseptal infarct, possibly acute.  Lateral injury pattern.  ** ACUTE MI / STEMI.  ** Anterolateral STEMI Reciprocal changes III aVF.   Time read 0345    Imaging:  Radiographic findings were communicated with the patient who voiced understanding of the findings.    Portable Chest XR per radiology:   IMPRESSION: Mild bibasilar atelectasis.    Laboratory:  CBC: WBC 14.1 (H) HGB 15.6 (WNL)  (WNL)     Creatinine POCT: Cr 0.9 (WNL) GFR 81 (WNL)  BMP: Cr 0.8. (WNL) Glucose 187 (H) Rest WNL    0347: Troponin POCT: 14.44 (HH)   0340: Troponin I: 29.002 (HH)  INR: 0.96 (WNL)    Interventions:  0352 Aspirin, 324 mg, PO  0353 Nitrostat, 0.4 mg, Sublingual  0358 Nitrostat, 0.4 mg, Sublingual  0400 Brilinta, 180 mg, PO  0404 Nitrostat, 0.4 mg, Sublingual  0404 Heparin Loading Dose, 4500 Units, IV injection     ED Course:  Nursing notes and past medical history reviewed.     I explained the plan with the patient who consents to this.     The patient underwent the workup as described above.     0343 An ECG was obtained.     0345 Code STEMI activated.      0345 I performed a physical examination of the  patient as documented above.    0352 Discussed the patient with Dr. Teresa from Interventional Cardiology.     0357  POCT Troponin 14.44.    0401 Portable chest XR obtained.  No mediastinal widening or pneumothorax per my read.     0403 Recheck.  Patient's pain improved to 4/10 after two nitro.  Receiving third.       0406 EMS arrives.    0418 Recheck.  Pain improved to 2-3/10.        0420 Patient transferred to [facility] Cath Lab via EMS.     Impression & Plan    CMS Diagnoses: The patient has a STEMI     The patient was evaluated at 0345   Patient was transferred  to the cath lab which was activated due to ECG changes.   ASA given in the ER  Medical Decision Making:  Matias Morel is a 81 year old male who presents with chest pain.  Their history and risk factor analysis are significant for HTN, HLD, and DM II.  The workup in the Emergency Department (see above for cardiac enzymes and EKG)  is consistent with a STEMI.    After discussing with patient the risks and benefits of heparinization and given lack of contraindication to this therapy, heparin bolus was started. Aspirin and Brilinta were given.  I discussed the patient with Dr. Teresa of Interventional Cardiology [facility] who will take the patient to the Cath Lab.     There is no clinical, laboratory, or radiographic evidence of pulmonary embolism, AAA, aortic dissection, pneumonia or pneumothorax.     Critical Care time was 35 minutes for this patient excluding procedures.    Diagnosis:    ICD-10-CM   1. ST elevation myocardial infarction (STEMI), unspecified artery (H) I21.3         Disposition:   Patient transferred via EMS to [facility] Cath Lab    Matt DEL CID am serving as a scribe on 4/26/2017 at 3:39 AM to personally document services performed by Juan Solis MD, based on my observations and the provider's statements to me.       Juan Solis MD  04/26/17 2399

## 2017-04-26 NOTE — PLAN OF CARE
Problem: Goal Outcome Summary  Goal: Goal Outcome Summary  OT/CR: Attempted to see after 6 hour bedrest but RN reporting patient dyspneic on trip to bathroom, would not tolerate exercise at this time. Rescheduling eval for tomorrow.

## 2017-04-26 NOTE — PROVIDER NOTIFICATION
Paged Dr. Clay regarding trop still > 200...next draw 2200.  Also SHELBY, 10 L oxymizer cannula to keep spO2 mid 90's, cough, and c/o sternal tightness.      Dr. Clay ordered IV lasix based in increased O2 requirements and ECHO results with EF 30-35%.  Will consider ordering daily oral diuretic.

## 2017-04-27 NOTE — PROGRESS NOTES
Care Transition Initial Assessment -   Reason For Consult: discharge planning  Met with: Patient    Active Problems:    * No active hospital problems. *         DATA  Lives With: alone  Living Arrangements: house      Identified issues/concerns regarding health management:      Transportation Available: car    Per social service protocol for discharge planning.  Patient was admitted on 4-26-17 with a stemi.  The tentative date of discharge is yet to be determined.  Reviewed chart and spoke with patient regarding discharge plans.  Per patient report, he lives alone in a house with 3 steps to enter and 15 steps inside.  Patient is independent at baseline with ADL's and IADL's.  Patient still drives.  Patient manages his own meds, does his own cooking, cleaning, and laundry.  Reviewed the therapy discharge recommendations of tcu placement and patient is in agreement.  Patient states he would prefer to be close to home and is interested in a referral being sent to either [facility] or [facility].  Patient states he would prefer a double room at [facility], but he may consider a private room.  Referral sent, via discharge on the double, to check bed availability.      ASSESSMENT  Cognitive Status:  awake and alert  Concerns to be addressed: discharge planning.     PLAN  Financial costs for the patient includes private room amenity fees.  Patient given options and choices for discharge tcu choices.  Patient/family is agreeable to the plan?  Yes  Patient Goals and Preferences: tcu on discharge.  Patient anticipates discharging to:  TCU on discharge.    Will confirm a bed, continue to follow, and assist with a safe discharge plan.    MCKENNA Astudillo, [facility]  [phone]

## 2017-04-27 NOTE — CONSULTS
NUTRITION EDUCATION    REASON FOR ASSESSMENT:  Nutrition education on American Heart Association (AHA) Heart Healthy Diet.    NUTRITION HISTORY:  Information obtained from patient.     He states that he follows a regular diet at home.  Has never been told to limit his fat and sodium intake.  Admits that he will add a little salt to his food when cooking.  Does cook for himself or will go out to eat.  Has a nice frying pan that he got from an [employer] and cooks most of his food in there.  Uses 1% milk, eats 3-4 fruits and vegies per day, uses red meat 2x per week, and likes to eat cinnamon rolls, cookies, and ice cream for snacks.      CURRENT DIET ORDER:  Moderate CHO, LSF, 2400 mg Na     NUTRITION DIAGNOSIS:  Food- and nutrition-related knowledge deficit R/t no previous formal diet education AEB above history      INTERVENTIONS:  Nutrition Prescription:    Recommended AHA Heart Healthy Diet    Implementation:     Nutrition Education (Content):  a) Reviewed AHA Heart Healthy Diet guidelines  b) Provided additional Healthy Heart diet handouts    Nutrition Education (Application):  a) Discussed current eating habits and recommended alternative food choices    Anticipate fair-good compliance    Diet Education - refer to Education flowsheet    Goals:    Patient verbalizes understanding of diet by asking appropriate questions     All of the above goals met during education session    Follow Up/Monitoring:    Provided RD contact information for future questions    Malina Day RD, LD, CNSC   Clinical Dietitian - [facility]

## 2017-04-27 NOTE — PLAN OF CARE
Problem: Cardiac: Acute Coronary Syndrome (ACS) (Adult)  Goal: Signs and Symptoms of Listed Potential Problems Will be Absent or Manageable (Cardiac: Acute Coronary Syndrome)  Signs and symptoms of listed potential problems will be absent or manageable by discharge/transition of care (reference Cardiac: Acute Coronary Syndrome (ACS) (Adult) CPG).  Outcome: No Change  Uneventful shift with patient sleeping fair.  Denies chest pain or lower rib pain.  Lungs with rales bilaterally. With saturations at 92%, patient still requiring Oxymizer at 10 L.  Patient does not appear short of breath.  Right groin site C/D/I.  Patient down in weight by 2 pounds.

## 2017-04-27 NOTE — PLAN OF CARE
Problem: Goal Outcome Summary  Goal: Goal Outcome Summary  OT/CR: eval completed and treatment initiated. Pt lives in a house alone and reports independence with ADL/IADL's and functional mobility, driving, cooking, cleaning and laundry. Pt admitted due to STEMI, s/p PCI x2 to LAD, possible staged intervention to RCA. Pt requires SBA/CGA for functional mobility for safety due to unsteadiness. Pt alert and oriented and ambulated approx 300 ft with O2 sats dropping to 84% on 10 L oxymizer and 1 standing rest break with cues for PLB and O2 sats rebounded back to the 90-93%, pt denies SOB, however, appeared fatigue. Pt unable to recall that he is a diabetic and educated in CAD risk factors and OP CR. Recommend TCU at this time, however, pending on progress, and further screen of cognition pt may be able to return home with OP CR at [facility], will continue to monitor.

## 2017-04-27 NOTE — PLAN OF CARE
Problem: Goal Outcome Summary  Goal: Goal Outcome Summary  Outcome: Therapy, progress toward functional goals as expected   Pt amulated for 10 min SBA wt no symptoms of dyspnea unable to obtain Sats while walking due to poor cirrulation to his hands. Pt on 10 L 02. After walking his Vitals were; 99/54 HR 78 unable to get a good 02 Sat due to poor circulation in both hands.  Requested getting am ear lob oximyzer for further treatments. Pt participated in  SLUMS' for cognition scoring 15/30 having deficits in STM, comprehension and  executive functions.  Discussed wt OTR results of cognition OTR  Is recommending discharge location is TCU.

## 2017-04-27 NOTE — PROGRESS NOTES
04/27/17 1035   Quick Adds   Type of Visit Initial Occupational Therapy Evaluation   Living Environment   Lives With alone   Living Arrangements house   Home Accessibility stairs within home;stairs to enter home   Number of Stairs to Enter Home 3   Number of Stairs Within Home 15   Transportation Available car   Self-Care   Regular Exercise yes   Activity/Exercise Type strength training   Exercise Amount/Frequency (1-2x/wk)   Functional Level Prior   Ambulation 0-->independent   Transferring 0-->independent   Toileting 0-->independent   Bathing 0-->independent   Dressing 0-->independent   Eating 0-->independent   Communication 0-->understands/communicates without difficulty   Swallowing 0-->swallows foods/liquids without difficulty   Cognition 0 - no cognition issues reported   Fall history within last six months no   Which of the above functional risks had a recent onset or change? ambulation   Prior Functional Level Comment Pt reports I with all ADL/IADL's, driving, managing meds, etc.   General Information   Onset of Illness/Injury or Date of Surgery - Date 04/26/17   Referring Physician Demond Dacosta MD   Patient/Family Goals Statement Return home   Additional Occupational Profile Info/Pertinent History of Current Problem Matias Morel is a 81 year old male with diet controlled diabetes and HL presents with an anterior STEMI.  s/p angio, aspiration thrombosis, and PCI with 2 stents LAD and staged procedure to RCA, troponin 200, EF 30-35%, ICM   Precautions/Limitations fall precautions  (MI precautions)   Heart Disease Risk Factors Diabetes;Dislipidemia;Family history;Gender;Age   Cognitive Status Examination   Orientation orientation to person, place and time   Level of Consciousness alert   Able to Follow Commands WNL/WFL   Cognitive Comment Will continue to monitor.   Sensory Examination   Sensory Quick Adds (B hand numbness)   Pain Assessment   Patient Currently in Pain No   Range of Motion (ROM)   ROM  Comment B UE AROM WFL   Bed Mobility Skill: Scooting/Bridging   Level of [place]: Scooting/Bridging stand-by assist   Physical Assist/Nonphysical Assist: Scooting/Bridging 1 person assist   Bed Mobility Skill: Supine to Sit   Level of [place]: Supine/Sit stand-by assist   Physical Assist/Nonphysical Assist: Supine/Sit 1 person assist   Transfer Skill: Bed to Chair/Chair to Bed   Level of [place]: Bed to Chair contact guard   Physical Assist/Nonphysical Assist: Bed to Chair 1 person assist;verbal cues   Transfer Skill: Sit to Stand   Level of [place]: Sit/Stand contact guard   Physical Assist/Nonphysical Assist: Sit/Stand 1 person assist;verbal cues   Lower Body Dressing   Level of [place]: Dress Lower Body contact guard   Physical Assist/Nonphysical Assist: Dress Lower Body 1 person assist;verbal cues   Eating/Self Feeding   Level of [place]: Eating independent   Instrumental Activities of Daily Living (IADL)   Previous Responsibilities meal prep;housekeeping;laundry;shopping;finances;driving   Activities of Daily Living Analysis   Impairments Contributing to Impaired Activities of Daily Living balance impaired;cognition impaired  (decreased activity tolerance)   General Therapy Interventions   Planned Therapy Interventions risk factor education;progressive activity/exercise;home program guidelines;cognition   Clinical Impression   Criteria for Skilled Therapeutic Interventions Met yes, treatment indicated   OT Diagnosis decreased ADL/IADL's and functional mobility.    Influenced by the following impairments impaired activity tolerance, strength, cognition/safety,balance   Assessment of Occupational Performance 3-5 Performance Deficits   Identified Performance Deficits impaired independence with ADL/IADL's ie driving, mgmanging meds, functional mobility.   Clinical Decision Making (Complexity) Low complexity   Therapy Frequency 2 times/day   Predicted Duration of Therapy Intervention  "(days/wks) 3 days   Anticipated Discharge Disposition Transitional Care Facility;Home with Assist;Home with Outpatient Therapy;Home with Home Therapy  (pending progress)   Risks and Benefits of Treatment have been explained. Yes   Patient, Family & other staff in agreement with plan of care Yes   [facility] AM-PAC  \"6 Clicks\" Daily Activity Inpatient Short Form   1. Putting on and taking off regular lower body clothing? 3 - A Little   2. Bathing (including washing, rinsing, drying)? 3 - A Little   3. Toileting, which includes using toilet, bedpan or urinal? 3 - A Little   4. Putting on and taking off regular upper body clothing? 4 - None   5. Taking care of personal grooming such as brushing teeth? 4 - None   6. Eating meals? 4 - None   Daily Activity Raw Score (Score out of 24.Lower scores equate to lower levels of function) 21   Total Evaluation Time   Total Evaluation Time (Minutes) 10     "

## 2017-04-27 NOTE — PLAN OF CARE
Problem: Goal Outcome Summary  Goal: Goal Outcome Summary  Outcome: No Change  A/O. Received from ICU which reported patient still has some chest tightness. Patient started on lasix and potassium Tele shows sinus rhythm. VSS. On oximyzer  at 10L. Lungs have coarse breath sounds. Appears comfortable at present.

## 2017-04-27 NOTE — PROGRESS NOTES
Cardiology    Patient seen and examined.  Had a good night.    Blood pressure 112/73, temperature 98.1  F (36.7  C), temperature source Oral, resp. rate 16, weight 66.9 kg (147 lb 7.8 oz), SpO2 90 %.    CV: RRR without m/g/r  Lungs: rales at bases  Abdomen: + BS soft  Ext: warm, no edema, groin stable    Tele:  NSR    Labs:    WBC: 20.2  Hgb: 14.8  Plat: 313    BUN: 18  Cr: 0.96    Echo:    The left ventricle is normal in size.  There is mild to moderate concentric left ventricular hypertrophy.  The visual ejection fraction is estimated at 30-35%.  There is a large anteroapical segment of severe hypokinesis to akinesis, also  involving the distal inferolateral and lateral walls. Consistent with MI in  LAD territory.  Grade II or moderate diastolic dysfunction.  E by E prime ratio is greater than 15, that likely suggests increased left  ventricular filling pressures.  There is no comparison study available. The study was technically difficult        A/P    1.  Anterior STEMI  -- late presentation  2.  Ischemic CM  3.  HTN   4.  Hyperlipidemia  5.  Elevated WBC    Recs:    1.  Anterior STEMI:               --  Continue ASA + Brillinta               --  Pulm edema with increased O2 requirements.  Will change to tid on Lasix                --  Continue Lopressor 25 mg bid (will change to toprol or coreg at some point)               --  Continue lisinopril with up titration.               --  Continue Lipitor    2.  ICM:                --  As above               --  May benefit from aldactone at some point               --  Echo as noted above               --  Late presentation, unclear recovery long term    3.  Cardiac rehab     4.  Elevated WBC:                  --  No signs/symptoms of infection                 --  May be related to MI                 --  Repeat CBC tomorrow                 --  PCXR tomorrow to assess pulm edema and ?atlectasis or pneumonia.       Katelyn Clay MD

## 2017-04-27 NOTE — PLAN OF CARE
Problem: Goal Outcome Summary  Goal: Goal Outcome Summary  Outcome: No Change  02 Sats drop to 80-82% when off o2. Right groin dressing removed-area c/d/i. Tires easily. No chest pain.

## 2017-04-27 NOTE — PHARMACY
Brilinta - $24    Thank you,  Pablo Hernandez CPhT  [facility] Discharge Pharmacy Liaison  [phone]

## 2017-04-27 NOTE — CONSULTS
PRIMARY CARE PHYSICIAN:  Unknown, patient is followed at [facility]        DATE OF SERVICE:  04/27/2017      REQUESTING PHYSICIAN:  Dr. Clay.      REASON FOR CONSULTATION:  Assistance with diabetic management following an acute anterior ST elevation MI.      HISTORY OF PRESENT ILLNESS:  Matias Morel is an 81-year-old male with a past medical history significant for diet-controlled type 2 diabetes, hypertension, hyperlipidemia and BPH who is being evaluated for diabetic management following an acute anterior ST elevation MI.      The patient was admitted on 04/26/2017 due to acute anterior ST elevation MI.  It appears patient developed chest pain on the evening of 04/25 and attempted to go to bed, hoping that it would resolve on its own.  When it did not resolve, the patient called 911 and was brought to [facility] Emergency Department.      At [facility] Emergency Department, patient was evaluated by Dr. Solis and was found to have an anterior ST elevation MI with noted troponin elevation with point of care noted to be 14.44 and then following or subsequent draw of 29.  The patient was transferred to [facility] after code STEMI activation and was taken directly to the Cath Lab.      The patient was found to have 2-vessel coronary artery disease involving the LAD and proximal RCA.  He was found to have 100% thrombotic occlusion of the proximal LAD and underwent successful PCI to the proximal and mid LAD using overlapping drug-eluting stents.  Per Cardiology documentation, patient will undergo planned staged PCI of proximal RCA as there is noted a 70-80% stenosis.      Following angiogram and successful PCI, patient has had persistent hyperglycemia with blood sugars remaining above 150 but below 200 and as such, the Hospitalist Service has been consulted.      I evaluated the patient in his hospital room where he was lying comfortably in bed upon arrival.  He endorsed  having chest pain in the evening of 04/25 and described it as a stabbing like sensation in the left side of his chest without radiation and associated diaphoresis, nausea and mild shortness of breath.  He does endorse having a slight cough and cold and does have abdominal pain with his coughing.  He endorses bruising and bleeding quite easily and some numbness and tingling sensation in his fingers.      PAST MEDICAL HISTORY:   1.  Diet-controlled type 2 diabetes.   2.  Hypertension.   3.  Hyperlipidemia.   4.  BPH.   5.  Newly diagnosed 2-vessel coronary artery disease with noted anterior ST elevation MI, found on 04/26/2017.      PAST SURGICAL HISTORY:   1.  TURP.   2.  Successful PCI to the proximal and mid LAD using overlapping drug-eluting stents on 04/26/2017.  Of note, the patient will be undergoing planned staged PCI of proximal RCA at a later date.      PRIOR TO ADMIT MEDICATIONS:  Tums 500 mg chewable tablet every other day.      ALLERGIES:  No known drug allergies.      SOCIAL HISTORY:  The patient currently resides in a house in [facility].  He has no known history or current use of tobacco, alcohol or street drugs.  He does not currently utilize a cane or walker.      FAMILY HISTORY:   1.  The patient's father lived to be 101 years old and had coronary thrombosis at the age of 51, otherwise remained healthy, per patient's report.   2.  Mother passed away at the age of 93, had breast cancer and dementia.      REVIEW OF SYSTEMS:  A 10-point review of systems was performed.  All pertinent positives are listed in the History of Present Illness, otherwise is negative.      PHYSICAL EXAMINATION:   VITAL SIGNS:  Temperature is 97.4 degrees Fahrenheit with a blood pressure of 100/55, heart rate of 74 beats per minute, respiratory rate of 16, O2 saturation of 100% on 10 liters using Oxymizer cannula.  The patient was denying any pain.   GENERAL:  The patient is awake, alert, cooperative, in no  apparent distress, alert and oriented x3.   HEENT:  Normocephalic, atraumatic.  Moist mucous membranes present.  No exudates noted in the posterior pharynx.  Uvula is midline.  Eyes:  Pupils are equal, round, reactive to light.  Extraocular movements are intact.  Normal sclerae.   NECK:  Supple, normal range of motion, no tracheal deviation, no cervical lymphadenopathy present.   CARDIAC:  Regular rate and rhythm, no rubs, murmurs or gallops appreciated.   PULMONARY:  Lungs are clear to auscultation bilaterally, no wheezes, rhonchi or rales appreciated.   GASTROINTESTINAL:  Bowel sounds are present in all 4 quadrants, soft, nontender, nondistended.   NEUROLOGIC:  Cranial nerves II-XII are grossly intact.  The patient demonstrates equal sensation, coordination and strength in the upper and lower extremities bilaterally.   EXTREMITIES:  No lower extremity edema noted bilaterally.  Calves are nontender to palpation and dorsal pedal pulses are palpable bilaterally.      ASSESSMENT AND PLAN:  Matias Morel is an 81-year-old male with a past medical history significant for diet-controlled type 2 diabetes, hypertension, hyperlipidemia, BPH, newly diagnosed 2-vessel coronary artery disease with acute anterior ST elevation myocardial infarction occurring on 04/26/2017 who is being evaluated for diabetic management.   1.  Newly diagnosed 2-vessel coronary artery disease of native vessel and native heart in the setting of acute anterior ST elevation MI occurring on 04/26/2017 with associated hypertension, hyperlipidemia:  Cardiology Service is managing at this point.  The patient has undergone successful PCI to the proximal and mid LAD with overlapping drug-eluting stent placement.  The patient has since been started on aspirin 81 mg daily, which should be lifelong, Brilinta 90 mg 2 times daily for at least 1 year uninterrupted.  The patient has since been started on Lopressor 25 mg 2 times daily, lisinopril 2.5 mg daily,  atorvastatin 40 mg daily and IV Lasix 40 mg 3 times daily.  Of note, patient's lipid profile showed a cholesterol of 191, HDL 40, LDL of 138, non-HDL cholesterol of 151 and triglycerides of 64.  The patient is currently being monitored on continuous telemetry as well.   2.  Diet-controlled type 2 diabetes with hyperglycemia:  The patient has been having elevated blood glucose levels between 150 and 170 range.  The Hospitalist Service has been consulted regarding management.  I have checked a hemoglobin A1c which came back at 6.3% and not requiring ongoing medications as an outpatient.  At this point, I believe patient's hyperglycemia is secondary to stress response from acute ST elevation MI.  The patient will continue with combination diet with moderate carbohydrates and cardiac diet.  I have placed the patient on low insulin sliding scale with Accu-Cheks performed 4 times daily.   3.  History of BPH:  Patient is status post TURP procedure.  No further interventions appear necessary.   4.  Deep venous thrombosis prophylaxis:  Per Cardiology, patient has been started on a baby aspirin daily and PCDs.      CODE STATUS:  Discussed with the patient, he elected to be full code.      DISPOSITION:  Ultimately up to Cardiac Service.      The patient was discussed with Dr. Bacon who agrees with the assessment and plan as stated above.  Dr. Bacon will evaluate the patient independently.      At this time, I would like to thank Dr. Clay for consulting the Hospitalist Service.  We will continue to follow.         PERCY BACON MD       As dictated by BELTRAN HOOD PA-C            D: 2017 13:58   T: 2017 14:47   MT: JACINTA      Name:     ANKIT KASPER   MRN:      [id number]        Account:       [id number]   :      1935           Consult Date:  2017      Document: [id number]       cc: [facility]        Katelyn Clay MD

## 2017-04-27 NOTE — PROGRESS NOTES
"S: WOCN consulted for suspected pressure injury to L Lateral 5th phalange. Pt reports he gets calluses there from time to time from his shoes rubbing. Pt has hx of HTN and diabetes type 2  O:  Comfeel dressing was removed and WOCN observed pt has 0.3 x 0.3 x 0cm superficial callus to L lateral 5th phalance with no redness, swelling, inflammation, drainage, open area or pain. Pedal pulses palpable. No rash or other irritations noted. Feet have multiple bony prominences.   A: Pt has no need for dressing or wound care at this time. WOCN educated on floating heels/feet off of bed to prevent PI  P: Perform \"good foot care\" at all times:  1. Clean feet daily with a gentle soap and water, making sure to clean between the toes. If cleaning in the shower, clean legs last using a new, clean washcloth for any wounds present.  Dry thoroughly, especially between the toes. Be careful when drying between toes to not use a sawing-action, this may cut the skin between the toes.   2. Apply a good lotion from toes to knees, not between toes (lotion moisturizes and too much moisture between toes may cause a fungal rash).    3.  If noticing moisture or odor between the toes dust with antifungal powder, miconazole powder, think Desenex, twice a day x 4-5 days.    4. Check feet at least two to three times every day by feeling with hands/ fingers and fully visualizing (use a hand held mirror).  You are looking for new wounds, irritation, etc.  Recommend checking in morning before even getting out of bed and check again before bed.   Also would be important to check feet after removing shoes, assuring no wrinkles in socks and shoes fitting properly.  Air feet throughout the day, as able (drying out feet and relieving pressure)  5. Wear hard soled shoes at all times to minimize bumping feet and/or stepping on something.  This includes wearing hard soled slippers around the house.   6. Keep blood sugars in a good balance.   7. Keep nails " trimmed and filed to minimize snagging and creating a wound.   8.  Every day put on clean, dry socks.    Time spent with patient: < 15minutes  Follow up: Consult closed no further f/u needed, please re refer with further concerns.    Hamida JANEOCN

## 2017-04-28 NOTE — SIGNIFICANT EVENT
"Hrenan CHAMPION Note  04/27/17    Called via RRT to patient's room for high heart rate.   Per bedside RN, HR in 200s.  Patient without complaints - no chest pain, no increased shortness of breath \"I think they said I have fluid on my lungs\". No palpitations or chest heaviness.     /69  Pulse 100  Temp 97.7  F (36.5  C) (Oral)  Resp 16  Wt 66.9 kg (147 lb 7.8 oz)  SpO2 96%  BMI 24.54 kg/m2  General: Elderly male resting in bed, NAD  CV: IRR, tachycardic, no murmurs  Resp: Bibasilar crackles, diminished in bases; mild increased work of breathing  Ext: WWP, no peripheral edema    EKG: atrial fibrillation with RVR, ST elevation in lateral leads, HR 150s    Impression/Plan:  Matias Morel is an 81 year old male who was late presentation anterior STEMI s/p PCI to proximal and mid LAD with plan for staged PCI to pRCA disease now with new-onset RVR and increased ST elevation in lateral leads. TTE with decreased EF 35%. Blood pressure stable, but low-normotensive. Discussed with Dr. Christina (on call cardiologist). Will plan for amiodarone bolus, gtt. Anticipate improved BP with decreased heart rate, but if hypotension with amiodarone could consider digoxin loading as patient renal function is overall good.     20 minutes spent in the care of this patient.     MD Hernan Gonzalez Physician  "

## 2017-04-28 NOTE — PLAN OF CARE
Problem: Goal Outcome Summary  Goal: Goal Outcome Summary  Outcome: Improving  Pt bp soft in the low 80s systolic with HR in the 130s on 10L oxymizer. Afib RVR 130s, began amio drip and converted to NSR 70s. BP was sustained in 80s. A&Ox4. No symptoms of dizziness of light headedness.. Up with assist of 1, using urinal. Denies pain. Plan. Continue to monitor.

## 2017-04-28 NOTE — PROGRESS NOTES
Patient called for RRT and is on 10L oxymizer with SpO2 93%. RT dismissed per MD.    Lester Gaytan RT  4/27/2017

## 2017-04-28 NOTE — CONSULTS
[facility]    Cardiac Electrophysiology Consultation     Date of Admission:  4/26/2017  Date of Consult (When I saw the patient): 04/28/17    Assessment & Plan   Matias Morel is a 81 year old male who was admitted on 4/26/2017. I was asked to see the patient urgently earlier this am for new onset of AF with RVR in 160 a/w drop in BP to 70-80 systolic from baseline . Presented late with anterior STEMI. PCI performed on LAD with plan of staging PCI of RCA. LVEF 35%. No sxs with AF. Denies palpitations or sob. Appeared comfortable. Cause likely from a combination of advanced age and cardiac disease. Suspect patient would not tolerate hypotension in light or recent AMI and residual coronary ischemia. No evidence of pulmonary edema. Recommended fluid bolus along with starting amio gtt. AF converted soon thereafter. Suspect AF may recur again. I would change IV to PO amio loading. No need for OAC at this point given long duration and the presence of DAPT. Given late presentation of AMI doubtful LV function would recover despite PCI. Repeat echo in 3 months. ICD if EF <35%    Akira Van Christina    Code Status    Full Code    Primary Care Physician   [facility]    History is obtained from the patient    Past Medical History   I have reviewed this patient's medical history and updated it with pertinent information if needed.   Past Medical History:   Diagnosis Date     Hypertension        Past Surgical History   I have reviewed this patient's surgical history and updated it with pertinent information if needed.  Past Surgical History:   Procedure Laterality Date     GENITOURINARY SURGERY         Prior to Admission Medications   Prior to Admission Medications   Prescriptions Last Dose Informant Patient Reported? Taking?   ACCU-CHEK SHARMIN VI SOLN   No No   Sig: use as directed and discard after 3 months   ACCU-CHEK SHARMIN VI STRP   No No   Sig: test 1 time per day   ACCU-CHEK MULTICLIX LANCETS  MISC   No No   Sig: test 1 time per day   calcium carbonate (TUMS) 500 MG chewable tablet 4/25/2017 at Unknown time Self Yes Yes   Sig: Take 1 chew tab by mouth every other day      Facility-Administered Medications: None     Allergies   No Known Allergies    Social History   I have reviewed this patient's social history and updated it with pertinent information if needed. Matias Morel  reports that he has never smoked. He does not have any smokeless tobacco history on file. He reports that he does not drink alcohol or use illicit drugs.    Family History   I have reviewed this patient's family history and updated it with pertinent information if needed.   No family history on file.    Review of Systems   Comprehensive review of systems was performed with pertinent positives and negatives listed in assessment and plan section.    Physical Exam   Temp: 98  F (36.7  C) Temp src: Oral BP: 94/64 Pulse: 100 Heart Rate: 74 Resp: 16 SpO2: 96 % O2 Device: Oxymizer cannula Oxygen Delivery: 10 LPM  Vital Signs with Ranges  Temp:  [97.4  F (36.3  C)-98.1  F (36.7  C)] 98  F (36.7  C)  Pulse:  [100] 100  Heart Rate:  [] 74  Resp:  [16] 16  BP: ()/(44-73) 94/64  SpO2:  [90 %-100 %] 96 %  148 lbs 2.39 oz    Constitutional: awake, alert, cooperative, no apparent distress, and appears stated age  Eyes: Lids and lashes normal, pupils equal, round and reactive to light, extra ocular muscles intact, sclera clear, conjunctiva normal  ENT: Normocephalic, without obvious abnormality, atraumatic, sinuses nontender on palpation, external ears without lesions, oral pharynx with moist mucous membranes, tonsils without erythema or exudates, gums normal and good dentition.  Hematologic / Lymphatic: no cervical lymphadenopathy  Respiratory: No increased work of breathing, good air exchange, clear to auscultation bilaterally, no crackles or wheezing  Cardiovascular: Normal apical impulse, regular rate and rhythm, normal S1 and  S2, no S3 or S4, and no murmur noted  GI: No scars, normal bowel sounds, soft, non-distended, non-tender, no masses palpated, no hepatosplenomegally  Skin: no bruising or bleeding  Musculoskeletal: There is no redness, warmth, or swelling of the joints.  Full range of motion noted.    Neurologic: Awake, alert, oriented to name, place and time.     Neuropsychiatric: General: normal, calm and normal eye contact    Data   I personally reviewed all recent ECGs and images.  Results for orders placed or performed during the hospital encounter of 04/26/17 (from the past 24 hour(s))   Glucose by meter   Result Value Ref Range    Glucose 172 (H) 70 - 99 mg/dL   Glucose by meter   Result Value Ref Range    Glucose 87 70 - 99 mg/dL   Glucose by meter   Result Value Ref Range    Glucose 122 (H) 70 - 99 mg/dL   EKG 12-lead, tracing only   Result Value Ref Range    Interpretation ECG Click View Image link to view waveform and result    Lactic acid level STAT   Result Value Ref Range    Lactic Acid 1.1 0.7 - 2.1 mmol/L   CBC with platelets   Result Value Ref Range    WBC 16.7 (H) 4.0 - 11.0 10e9/L    RBC Count 4.34 (L) 4.4 - 5.9 10e12/L    Hemoglobin 13.1 (L) 13.3 - 17.7 g/dL    Hematocrit 39.2 (L) 40.0 - 53.0 %    MCV 90 78 - 100 fl    MCH 30.2 26.5 - 33.0 pg    MCHC 33.4 31.5 - 36.5 g/dL    RDW 15.7 (H) 10.0 - 15.0 %    Platelet Count 282 150 - 450 10e9/L   Basic metabolic panel   Result Value Ref Range    Sodium 138 133 - 144 mmol/L    Potassium 3.9 3.4 - 5.3 mmol/L    Chloride 102 94 - 109 mmol/L    Carbon Dioxide 27 20 - 32 mmol/L    Anion Gap 9 3 - 14 mmol/L    Glucose 128 (H) 70 - 99 mg/dL    Urea Nitrogen 33 (H) 7 - 30 mg/dL    Creatinine 1.19 0.66 - 1.25 mg/dL    GFR Estimate 59 (L) >60 mL/min/1.7m2    GFR Estimate If Black 71 >60 mL/min/1.7m2    Calcium 8.3 (L) 8.5 - 10.1 mg/dL   Glucose by meter   Result Value Ref Range    Glucose 105 (H) 70 - 99 mg/dL     Critical time spent in chart review, examining and  recommendations 45 min.

## 2017-04-28 NOTE — PROGRESS NOTES
SW:  D:  Received a call back from [facility].  They have no available beds.  Received a call back from [facility].  They can accept patient on Saturday or Sunday once patient's oxygen requirements have come down.  P:  Will continue to follow.

## 2017-04-28 NOTE — PLAN OF CARE
Problem: Goal Outcome Summary  Goal: Goal Outcome Summary  OT/CR: pt ambulated approx 400 ft with SBA and denies SOB and stable CV reponse and O2 sats 93-94% on 10 L Oxymizer with ambulation. Pt requires SBA for toileting and washing hands at sink. Recommend TCU at discharge, if home recommend A with all IADL's ie med mgmt,d riving etc and home OT for home safety eval, cognition and eventual OP CR.    OT/CR: pt ambulated 600 ft with SBA/CGA with fatigue and stable CV response, unable to get accurate O2 sats due to low perfusion, pt 8 L oxymizer. Pt requires SBA for toielt transfer. Recommend TCU at discharge.

## 2017-04-28 NOTE — CODE/RAPID RESPONSE
Pt was found to have HR 200s. Asymptomatic. RRT paged. 12 lead EKG confirmed A-fib with RVR. BP was in 90s. HR decreased to 150s. MD ordered for Amiodarone bolus and gtt. BP was in 80s. Started on IV bolus 500 ml and started on bolus Amiodarone over 30 mins as per Dr. Christina.

## 2017-04-28 NOTE — PLAN OF CARE
Problem: Goal Outcome Summary  Goal: Goal Outcome Summary  Outcome: No Change  VSS. O2 weaned down to 6L oximyzer, pt tolerating well. Tele shows SR. Pt denies any chest pain or SOB. New orders for Metoprolol 12.5mg BID. Pt started on PO Amio this morning, pt tolerating well. Plan is to continue with IV diuresing and wean O2. Bed alarm on. Will continue to monitor pt.

## 2017-04-29 NOTE — PROGRESS NOTES
[facility]    Sepsis Evaluation Progress Note    Date of Service: 04/29/2017    I was called to see Matias Morel due to abnormal vital signs triggering the Sepsis SIRS screening alert. He is not known to have an infection.     Physical Exam    Vital Signs:  Temp: 97.2  F (36.2  C) Temp src: Oral BP: 101/56 Pulse: 83 Heart Rate: 88 Resp: 16 SpO2: 94 % O2 Device: None (Room air) Oxygen Delivery: 3 LPM    Lab:  Lactic Acid   Date Value Ref Range Status   04/29/2017 2.9 (H) 0.7 - 2.1 mmol/L Final       The patient is at baseline mental status.    The rest of their physical exam is significant for tachycardia.    Assessment and Plan    The SIRS and exam findings are likely due to   sepsis.     ID: The patient is currently on the following antibiotics:  Anti-infectives (Future)    Start     Dose/Rate Route Frequency Ordered Stop    04/29/17 1630  levofloxacin (LEVAQUIN) infusion 500 mg     Comments:  Pharmacy can adjust dose based on renal function.    500 mg  100 mL/hr over 60 Minutes Intravenous EVERY 24 HOURS 04/29/17 1629          Current antibiotic coverage requires additional antibiotics for pulmonary source.    Fluid: Fluid bolus not indicated due to heart failure.    Lab: Repeat lactic acid ordered for 2 hours from now.    Disposition: The patient will remain on the current unit. We will continue to monitor this patient closely.    Andriy Wilson, DO

## 2017-04-29 NOTE — PROGRESS NOTES
[facility]  Hospitalist Progress Note        Andriy Wilson, DO  04/29/2017        Interval History:      Patient states that he is doing well. Updated on plan of care.          Assessment and Plan:        81-year-old male with a past medical history significant for diet-controlled type 2 diabetes, hypertension, hyperlipidemia, BPH, newly diagnosed 2-vessel coronary artery disease with acute anterior ST elevation myocardial infarction occurring on 04/26/2017 who is being evaluated for diabetic management.      Newly diagnosed 2-vessel CAD in the setting of acute anterior STEMI occurring on 04/26/2017 with associated hypertension, hyperlipidemia: s/p successful PCI to the proximal and mid LAD with overlapping drug-eluting stent placement. lipid profile showed a cholesterol of 191, HDL 40, LDL of 138, non-HDL cholesterol of 151 and triglycerides of 64.   - mgt per cardiology   - aspirin 81 mg daily   - Brilinta BID uninterrupted for at least 1 year.    - Lopressor BID (plan to switch to coreg or toprol XL)   - lisinopril daily,    - atorvastatin daily     Acute hypoxic respiratory failure secondary to acute decompensated HFrEF / ischemic cardiomyopathy Echo on 4/26 shows mild to moderated LVH with EF of 30-35% with large anteroapical segment of severe hypokinesis to akinesis, also involing distal inferolateral and lateral walls, grade II diastolic heart failure.    - Fluid restrict.    - Given late presentation of AMI doubtful LV function would recover despite PCI. Repeat echo in 3 months. ICD if EF <35%     Paroxysmal Afib with RVR on 4/27   - started on amio drip > converted to PO loading.    - No need for OAC at this point given long duration and the presence of DAPT.     Leukocytosis- noted and suspect secondary to AMI. No symptoms to suggest acute infection. No fevers chills, pleuritic chest pain. Reviewed CXR and bilateral patchy infiltrates with pleural effusions most c/w CHF.    -  Monitor.     Diet-controlled type 2 diabetes with stress hyperglycemia around MI: A1c of 6.3 on ,   - continue diabetic diet     History of BPH: s/p TURP procedure. Stable       Deep venous thrombosis prophylaxis: Per Cardiology, patient has been started on a baby aspirin daily, plavix and PCDs. AMBULATE.      CODE: full code.      Dispo: ultimately per cardiology but suspect will need at least 2 more days given decompensated heart failure and renal insufficiency.                    Physical Exam:      Heart Rate: 75    Blood pressure 95/54, pulse 100, temperature 98.2  F (36.8  C), temperature source Oral, resp. rate 18, weight 66.7 kg (147 lb 0.8 oz), SpO2 95 %.    Vitals:    17 0500 17 0500 17 0420   Weight: 66.9 kg (147 lb 7.8 oz) 67.2 kg (148 lb 2.4 oz) 66.7 kg (147 lb 0.8 oz)       Vital Sign Ranges  Temperature Temp  Av.9  F (36.6  C)  Min: 97.5  F (36.4  C)  Max: 98.2  F (36.8  C)   Blood pressure Systolic (24hrs), Av , Min:79 , Max:125        Diastolic (24hrs), Av, Min:50, Max:75      Pulse No Data Recorded   Respirations Resp  Av.3  Min: 16  Max: 18   Pulse oximetry SpO2  Av.3 %  Min: 89 %  Max: 99 %     Vital Signs with Ranges  Temp:  [97.5  F (36.4  C)-98.2  F (36.8  C)] 98.2  F (36.8  C)  Heart Rate:  [71-79] 75  Resp:  [16-18] 18  BP: ()/(50-75) 95/54  SpO2:  [89 %-99 %] 95 %    I/O Last 3 Shifts:   I/O last 3 completed shifts:  In: 782 [P.O.:770; I.V.:12]  Out: 1000 [Urine:1000]    I/O past 24 hours:     Intake/Output Summary (Last 24 hours) at 17 08  Last data filed at 17 0420   Gross per 24 hour   Intake              782 ml   Output             1000 ml   Net             -218 ml     GENERAL: Alert and oriented. NAD. Conversational, appropriate.   HEENT: Normocephalic. EOMI. No icterus or injection. Nares normal.   LUNGS: Crackles bilaterally. No dyspnea at rest.   HEART: Regular rate. Extremities perfused.   ABDOMEN: Soft, nontender,  and nondistended. Positive bowel sounds.   EXTREMITIES: No LE edema noted.   NEUROLOGIC: Moves extremities x4 on command. No acute focal neurologic abnormalities noted.          Prior to Admission Medications:        Prescriptions Prior to Admission   Medication Sig Dispense Refill Last Dose     calcium carbonate (TUMS) 500 MG chewable tablet Take 1 chew tab by mouth every other day   4/25/2017 at Unknown time     ACCU-CHEK SHARMIN VI STRP test 1 time per day 1 month prn      ACCU-CHEK SHARMIN VI SOLN use as directed and discard after 3 months 1 prn      ACCU-CHEK MULTICLIX LANCETS MISC test 1 time per day 1month prn             Medications:        Current Facility-Administered Medications   Medication Last Rate     Percutaneous Coronary Intervention orders placed (this is information for BPA alerting)       Current Facility-Administered Medications   Medication Dose Route Frequency     amiodarone  200 mg Oral BID     metoprolol  12.5 mg Oral BID     furosemide  40 mg Intravenous TID     insulin aspart  1-3 Units Subcutaneous TID AC     insulin aspart  1-3 Units Subcutaneous At Bedtime     sodium chloride (PF)  3 mL Intracatheter Q8H     aspirin EC  81 mg Oral Daily     ticagrelor  90 mg Oral Q12H     lisinopril  2.5 mg Oral Daily     atorvastatin  40 mg Oral Daily     Current Facility-Administered Medications   Medication Dose Route Frequency     glucose  15-30 g Oral Q15 Min PRN    Or     dextrose  25-50 mL Intravenous Q15 Min PRN    Or     glucagon  1 mg Subcutaneous Q15 Min PRN     lidocaine  1 mL Other Q1H PRN     lidocaine 4%   Topical Q1H PRN     sodium chloride (PF)  3 mL Intracatheter Q1H PRN     nitroglycerin  0.4 mg Sublingual Q5 Min PRN     acetaminophen  325-650 mg Oral Q4H PRN     HYDROcodone-acetaminophen  1-2 tablet Oral Q4H PRN     naloxone  0.1-0.4 mg Intravenous Q2 Min PRN     Percutaneous Coronary Intervention orders placed (this is information for BPA alerting)   Does not apply DOES NOT GO TO MAR             Data:      Lab data reviewed.     Recent Labs  Lab 04/29/17  0513 04/28/17  0548 04/27/17  0525 04/26/17  2207 04/26/17  1355  04/26/17  0347 04/26/17  0340   HGB 12.8* 13.1* 14.8  --   --   --   --  15.6   MCV  --  90 91  --   --   --   --  92   PLT  --  282 313  --   --   --   --  402   INR  --   --   --   --   --   --   --  0.96    138 141  --   --   --   --  139   POTASSIUM 3.6 3.9 4.4  --   --   --   --  3.8   CHLORIDE 100 102 105  --   --   --   --  103   CO2 31 27 28  --   --   --   --  28   BUN 40* 33* 18  --   --   --   --  13   CR 1.35* 1.19 0.96  --   --   --   --  0.83   ANIONGAP 6 9 8  --   --   --   --  8   ROBLES 8.2* 8.3* 8.6  --   --   --   --  8.8   * 128* 153*  --   --   --   --  187*   TROPI  --   --  125.570* >200.000The 99th percentile for upper reference range is 0.045 ug/L.  Troponin values in the range of 0.045 - 0.120 ug/L may be associated with risks of adverse clinical events. Previous critical documented* >200.000The 99th percentile for upper reference range is 0.045 ug/L.  Troponin values in the range of 0.045 - 0.120 ug/L may be associated with risks of adverse clinical events. Previous critical documented*  < >  --  29.002*   TROPONIN  --   --   --   --   --   --  14.44*  --    < > = values in this interval not displayed.        Imaging:      Imaging data reviewed.     Dr. Andriy Wilson D.O.  [facility]  Pager [phone]

## 2017-04-29 NOTE — PROVIDER NOTIFICATION
Dr. Wilson notified of left Antecubital old iv site appearing reddened, warm to touch and some firmness in left extending to area above antecubital space.  Pt has some tenderness with palpation.  Will proceed with ultrasound of upper arm, warm packs to site.

## 2017-04-29 NOTE — PLAN OF CARE
Problem: Goal Outcome Summary  Goal: Goal Outcome Summary  OT/CR: O2 low 90s with ambulation in baltazar, 8 min with SB A, however . Notified RN. Rec TCU. OT/CR to continue daily. Rec patient ambulate with nursing >2x/day.

## 2017-04-29 NOTE — PROGRESS NOTES
[facility]    Cardiology Progress Note    Date of Service (when I saw the patient): 04/29/2017     Assessment & Plan   Matias Morel is a 81 year old male with diet controlled DM and dyslipidemia who was admitted on 4/26/2017 with an anterior STEMI.    TTE - LVEF 30-35% with LVH and anteroapical akinesis.     RECOMMENDATIONS:  -Anterior STEMI (late presentation) - Coronary angiography reveal 100% thrombotic LAD occlusion and mild LCx and 80% pRCA disease -> rx with overlapping 2.75x38 and 3.0x24 MARILYN. Continue with ASA for life and Brillinta 90 mg BID in addition to atorvastatin 40 mg daily, metoprolol 12.5 mg BID, and lisinopril 2.5 mg daily.    Post-MI EF 30-35% - on lisinopril and metoprolol. Given O2 requirement and elevated JVP, was on furosemide 40 mg TID yesterday but held this am. Will give single dose of furosemide this PM.     Still with residual non-culprit RCA disease - needs staged intervention but would hold at this time due to FRANCY. Will need cardiology follow up arranged.    FRANCY - creatinine increased from 0.8 baseline -> 1.4 today, lasix held this morning.    Atrial fibrillation - initiated on amiodarone 200 mg BID by EP. Anticoagulation not started by EP given duration.    Leukocytosis - down trending.    Dispo - planned for TCU.    Juan Ng MD  [phone]    ------------------------------------------------------------------------------  Interval History   No overnight events    Physical Exam   Temp: 98.4  F (36.9  C) Temp src: Oral BP: 92/54 Pulse: 74 Heart Rate: 88 Resp: 18 SpO2: 96 % O2 Device: Nasal cannula Oxygen Delivery: 4 LPM  Vitals:    04/27/17 0500 04/28/17 0500 04/29/17 0420   Weight: 66.9 kg (147 lb 7.8 oz) 67.2 kg (148 lb 2.4 oz) 66.7 kg (147 lb 0.8 oz)     Vital Signs with Ranges  Temp:  [97.5  F (36.4  C)-98.4  F (36.9  C)] 98.4  F (36.9  C)  Pulse:  [74] 74  Heart Rate:  [] 88  Resp:  [16-18] 18  BP: ()/(54-82) 92/54  SpO2:  [89 %-99 %] 96  %  I/O last 3 completed shifts:  In: 782 [P.O.:770; I.V.:12]  Out: 1000 [Urine:1000]      Medications     Percutaneous Coronary Intervention orders placed (this is information for BPA alerting)         amiodarone  200 mg Oral BID     metoprolol  12.5 mg Oral BID     insulin aspart  1-3 Units Subcutaneous TID AC     insulin aspart  1-3 Units Subcutaneous At Bedtime     sodium chloride (PF)  3 mL Intracatheter Q8H     aspirin EC  81 mg Oral Daily     ticagrelor  90 mg Oral Q12H     lisinopril  2.5 mg Oral Daily     atorvastatin  40 mg Oral Daily       Data   Results for orders placed or performed during the hospital encounter of 04/26/17 (from the past 24 hour(s))   Glucose by meter   Result Value Ref Range    Glucose 101 (H) 70 - 99 mg/dL   Glucose by meter   Result Value Ref Range    Glucose 102 (H) 70 - 99 mg/dL   Basic metabolic panel   Result Value Ref Range    Sodium 137 133 - 144 mmol/L    Potassium 3.6 3.4 - 5.3 mmol/L    Chloride 100 94 - 109 mmol/L    Carbon Dioxide 31 20 - 32 mmol/L    Anion Gap 6 3 - 14 mmol/L    Glucose 123 (H) 70 - 99 mg/dL    Urea Nitrogen 40 (H) 7 - 30 mg/dL    Creatinine 1.35 (H) 0.66 - 1.25 mg/dL    GFR Estimate 51 (L) >60 mL/min/1.7m2    GFR Estimate If Black 61 >60 mL/min/1.7m2    Calcium 8.2 (L) 8.5 - 10.1 mg/dL   WBC count   Result Value Ref Range    WBC 14.6 (H) 4.0 - 11.0 10e9/L   Hemoglobin   Result Value Ref Range    Hemoglobin 12.8 (L) 13.3 - 17.7 g/dL   Procalcitonin   Result Value Ref Range    Procalcitonin 0.19 ng/ml

## 2017-04-29 NOTE — PLAN OF CARE
Problem: Goal Outcome Summary  Goal: Goal Outcome Summary  Pt continues on O2 @ 4L/NC today.  Noted SHELBY but resolves with rest.  Pt in SR but had self limiting episode of atrial fib w/ rate 140's after rehab.  Pt states overall feeling better today and is progressing.  Rt groin site ecchymotic.  Creatinine up to 1.35, Lasix on hold.

## 2017-04-29 NOTE — PLAN OF CARE
Problem: Goal Outcome Summary  Goal: Goal Outcome Summary  Outcome: Improving  Pt VSS, weaned down to 3 L oxymizer, pt continues to have soft bps in 90s, but asymptomatic. NSR 70s. A&Ox4. SBA. Using urinal. Left PIV pulled due to leaking, right PIV still patent. Denies pain. Slept. D/C to tcu today or tomorrow pending oxygen sats. Continue to monitor

## 2017-04-30 NOTE — PLAN OF CARE
Problem: Goal Outcome Summary  Goal: Goal Outcome Summary  Outcome: No Change  Care assumed from previous RN @ 1930. A&O.  Tele sinus dysrhythmia 70s to 90s, oxygen saturation 94% on 2 L NC, occasional dry cough. Mod carb diet. Up with assist of 1. Denies pain. Right groin site flat, dry and intact, CMS intact. Has IV Levaquin ordered due to elevated lactic, dose delayed due to difficulty getting new IV site. Repeat lactic increased,  MD notified, small bolus and AM repeat lactic will be ordered by MD. Will schedule the bolus when antibiotic will be completed @ midnight.  Plan: pending.

## 2017-04-30 NOTE — PROGRESS NOTES
X cover note:    Notified of LUE US showing superficial clot in the cephalic pain.  IV has been taken off this site.  Continue supportive measures and symptomatic management.      Babs Vasquez MD  Hospitalist

## 2017-04-30 NOTE — PROGRESS NOTES
[facility]  Hospitalist Progress Note        Andriy Cristel Steve, DO  04/30/2017        Interval History:      Patient states that he is doing well today.          Assessment and Plan:        81-year-old male with a past medical history significant for diet-controlled type 2 diabetes, hypertension, hyperlipidemia, BPH, newly diagnosed 2-vessel coronary artery disease with acute anterior ST elevation myocardial infarction occurring on 04/26/2017 who is being evaluated for diabetic management.       Newly diagnosed 2-vessel CAD in the setting of acute anterior STEMI occurring on 04/26/2017 with associated hypertension, hyperlipidemia: s/p successful PCI to the proximal and mid LAD with overlapping drug-eluting stent placement. lipid profile showed a cholesterol of 191, HDL 40, LDL of 138, non-HDL cholesterol of 151 and triglycerides of 64.   - mgt per cardiology   - aspirin 81 mg daily   - Brilinta    - Metoprolol.    - lisinopril    - atorvastatin       Acute hypoxic respiratory failure secondary to acute decompensated HFrEF / ischemic cardiomyopathy Echo on 4/26 shows mild to moderated LVH with EF of 30-35% with large anteroapical segment of severe hypokinesis to akinesis, also involing distal inferolateral and lateral walls, grade II diastolic heart failure.    - Fluid restrict.    - Repeat echo in 3 months. ICD if EF <35%      Paroxysmal Afib with RVR on 4/27 started on amio drip > converted to PO loading.    - Amiodarone.    - No need for OAC at this point given long duration and the presence of DAPT.      Leukocytosis- noted and suspect secondary to AMI. No symptoms to suggest acute infection. No fevers chills, pleuritic chest pain. Reviewed CXR and bilateral patchy infiltrates with pleural effusions most c/w CHF.    - Improved.      Diet-controlled type 2 diabetes with stress hyperglycemia around MI: A1c of 6.3 on 4/27,   - diabetic diet      History of BPH: s/p TURP procedure.    - Stable  "      Deep venous thrombosis prophylaxis: PCDs.      CODE: full code.       Dispo: ultimately per cardiology but suspect will need at least 1-2 more days given decompensated heart failure.                    Physical Exam:      Heart Rate: 79    Blood pressure 94/58, pulse 83, temperature 97.7  F (36.5  C), temperature source Oral, resp. rate 16, height 1.651 m (5' 5\"), weight 66.3 kg (146 lb 1.6 oz), SpO2 92 %.    Vitals:    17 0500 17 0420 17 0436   Weight: 67.2 kg (148 lb 2.4 oz) 66.7 kg (147 lb 0.8 oz) 66.3 kg (146 lb 1.6 oz)       Vital Sign Ranges  Temperature Temp  Av.8  F (36.6  C)  Min: 97.2  F (36.2  C)  Max: 98.4  F (36.9  C)   Blood pressure Systolic (24hrs), Av , Min:92 , Max:125        Diastolic (24hrs), Av, Min:54, Max:82      Pulse Pulse  Av  Min: 74  Max: 83   Respirations Resp  Av  Min: 16  Max: 24   Pulse oximetry SpO2  Av.6 %  Min: 90 %  Max: 96 %     Vital Signs with Ranges  Temp:  [97.2  F (36.2  C)-98.4  F (36.9  C)] 97.7  F (36.5  C)  Pulse:  [74-83] 83  Heart Rate:  [] 79  Resp:  [16-24] 16  BP: ()/(54-82) 94/58  SpO2:  [90 %-96 %] 92 %    I/O Last 3 Shifts:   I/O last 3 completed shifts:  In: 1100 [P.O.:960; I.V.:140]  Out: 1050 [Urine:1050]    I/O past 24 hours:     Intake/Output Summary (Last 24 hours) at 17 0724  Last data filed at 17 2300   Gross per 24 hour   Intake             1100 ml   Output             1050 ml   Net               50 ml     GENERAL: Alert and oriented. NAD. Conversational, appropriate.   HEENT: Normocephalic. EOMI. No icterus or injection. Nares normal.   LUNGS: Crackles bilaterally, improved from previous. No dyspnea at rest.   HEART: Regular rate. Extremities perfused.   ABDOMEN: Soft, nontender, and nondistended. Positive bowel sounds.   EXTREMITIES: No LE edema noted.   NEUROLOGIC: Moves extremities x4 on command. No acute focal neurologic abnormalities noted.          Prior to Admission " Medications:        Prescriptions Prior to Admission   Medication Sig Dispense Refill Last Dose     calcium carbonate (TUMS) 500 MG chewable tablet Take 1 chew tab by mouth every other day   4/25/2017 at Unknown time     ACCU-CHEK SHARMIN VI STRP test 1 time per day 1 month prn      ACCU-CHEK SHAMRIN VI SOLN use as directed and discard after 3 months 1 prn      ACCU-CHEK MULTICLIX LANCETS MISC test 1 time per day 1month prn             Medications:        Current Facility-Administered Medications   Medication Last Rate     Percutaneous Coronary Intervention orders placed (this is information for BPA alerting)       Current Facility-Administered Medications   Medication Dose Route Frequency     furosemide  40 mg Intravenous Daily     levofloxacin  750 mg Intravenous Q48H     amiodarone  200 mg Oral BID     metoprolol  12.5 mg Oral BID     insulin aspart  1-3 Units Subcutaneous TID AC     insulin aspart  1-3 Units Subcutaneous At Bedtime     sodium chloride (PF)  3 mL Intracatheter Q8H     aspirin EC  81 mg Oral Daily     ticagrelor  90 mg Oral Q12H     lisinopril  2.5 mg Oral Daily     atorvastatin  40 mg Oral Daily     Current Facility-Administered Medications   Medication Dose Route Frequency     metoprolol  2.5-5 mg Intravenous Q4H PRN     glucose  15-30 g Oral Q15 Min PRN    Or     dextrose  25-50 mL Intravenous Q15 Min PRN    Or     glucagon  1 mg Subcutaneous Q15 Min PRN     lidocaine  1 mL Other Q1H PRN     lidocaine 4%   Topical Q1H PRN     sodium chloride (PF)  3 mL Intracatheter Q1H PRN     nitroglycerin  0.4 mg Sublingual Q5 Min PRN     acetaminophen  325-650 mg Oral Q4H PRN     HYDROcodone-acetaminophen  1-2 tablet Oral Q4H PRN     naloxone  0.1-0.4 mg Intravenous Q2 Min PRN     Percutaneous Coronary Intervention orders placed (this is information for BPA alerting)   Does not apply DOES NOT GO TO MAR            Data:      Lab data reviewed.     Recent Labs  Lab 04/30/17  0520 04/29/17  1425 04/29/17  0513  04/28/17  0548 04/27/17  0525 04/26/17  2207 04/26/17  1355  04/26/17  0347 04/26/17  0340   HGB 13.4  --  12.8* 13.1* 14.8  --   --   --   --  15.6   MCV 90  --   --  90 91  --   --   --   --  92     --   --  282 313  --   --   --   --  402   INR  --   --   --   --   --   --   --   --   --  0.96     --  137 138 141  --   --   --   --  139   POTASSIUM 3.6  --  3.6 3.9 4.4  --   --   --   --  3.8   CHLORIDE 99  --  100 102 105  --   --   --   --  103   CO2 32  --  31 27 28  --   --   --   --  28   BUN 37*  --  40* 33* 18  --   --   --   --  13   CR 1.17 1.27* 1.35* 1.19 0.96  --   --   --   --  0.83   ANIONGAP 7  --  6 9 8  --   --   --   --  8   ROBLES 8.3*  --  8.2* 8.3* 8.6  --   --   --   --  8.8   *  --  123* 128* 153*  --   --   --   --  187*   TROPI  --   --   --   --  125.570* >200.000The 99th percentile for upper reference range is 0.045 ug/L.  Troponin values in the range of 0.045 - 0.120 ug/L may be associated with risks of adverse clinical events. Previous critical documented* >200.000The 99th percentile for upper reference range is 0.045 ug/L.  Troponin values in the range of 0.045 - 0.120 ug/L may be associated with risks of adverse clinical events. Previous critical documented*  < >  --  29.002*   TROPONIN  --   --   --   --   --   --   --   --  14.44*  --    < > = values in this interval not displayed.        Imaging:      Imaging data reviewed.     Dr. Andriy Wilson D.O.  [facility]  Pager [phone]

## 2017-04-30 NOTE — PROVIDER NOTIFICATION
MD Notification    Notified Person:  MD    Notified Persons Name:Bebe    Notification Date/Time: 4/30/17 0046    Notification Interaction:  Talked with Physician    Purpose of Notification: Pt ultrasound results back revealing small clot in left cephalic vein, need orders for how to proceed    Orders Received: Warm compresses on arm,  keep fluids running as ordered    Comments:

## 2017-04-30 NOTE — PROGRESS NOTES
Called by nursing staff regarding elevated lactic acid, levaquin infusing for possible pneumonia coverage. Ultrasound pending for evaluation of left antecubital IV site. Small fluid bolus ordered at this time.

## 2017-04-30 NOTE — PLAN OF CARE
Problem: Goal Outcome Summary  Goal: Goal Outcome Summary  Outcome: Therapy, progress toward functional goals is gradual  PT performed transfers and ambulation in hallway with CGA-SBA and occasional cue for safety.  BP showed mild drop with exercise without associated sx (117/85 at rest to 112/78 after walk).  HR WNL's.  O2 sats 93% on 1 L at rest.  Very difficult to get accurate O2 sat reading with ear or finger probe to test readings on RA with activity.  After delay O2 sat 88-90% on RA.  1L of O2 put back on as PT to rest in bed.  Recommend TCU followed by Out PT cardiac rehab ([facility]).

## 2017-04-30 NOTE — PLAN OF CARE
Problem: Goal Outcome Summary  Goal: Goal Outcome Summary  Outcome: Improving  Pt VSS on 2L NC w/ humidifier, BP's still soft in the 90s. Has been in NS in 70s. A&Ox4. SBA, using urinal. Denies pain. Left arm still reddened at old IV site, ultra sound done last night found small superficial clot in left cephalic vein, md ordered warm packs. Pt lactics elevated yesterday, was given small bolus of NS. Slept. Continue abx therapy. Continue to monitor.

## 2017-04-30 NOTE — PLAN OF CARE
Problem: Goal Outcome Summary  Goal: Goal Outcome Summary  O2 weaned down to 1L/NC.  Diuresing well with iv lasix.  Pt continues with episodes of SVT rate to 180's w/ rehab/activity.  Left arm AC (old iv site) area is just slightly pink today, denies discomfort-Warm packed x2.

## 2017-04-30 NOTE — PROGRESS NOTES
[facility]    Cardiology Progress Note    Date of Service (when I saw the patient): 04/30/2017     Assessment & Plan   Matias Morel is a 81 year old male with diet controlled DM and dyslipidemia who was admitted on 4/26/2017 with an anterior STEMI.    TTE - LVEF 30-35% with LVH and anteroapical akinesis.     RECOMMENDATIONS:  -Anterior STEMI (late presentation) - Coronary angiography reveal 100% thrombotic LAD occlusion and mild LCx and 80% pRCA disease -> rx with overlapping 2.75x38 and 3.0x24 MARILYN. Continue with ASA for life and Brillinta 90 mg BID for at least one yeay in addition to atorvastatin 40 mg daily, metoprolol, and lisinopril 2.5 mg daily.    Still with residual non-culprit RCA disease - needs staged intervention but would hold at this time due to FRANCY. Will need cardiology follow up arranged.    -Acute systolic heart failure -Post-MI EF 30-35% - on lisinopril and metoprolol. Given on going O2 requirement, basilar crackles and elevated JVP; will increase IV lasix to BID can likely be transitioned to PO tomorrow. Creatinine stable after dose yesterday and symptoms stable - weight down ~ 0.5 kg.    -Narrow complex tachycardia - Numerous brief (seconds) asymptomatic runs on telemetry during rehab up 180 BPM. On amiodarone - will increase metoprolol to 25 mg BID.    FRANCY - creatinine improving; 1.2 mg\dL - baseline 0.8-0.9.    Atrial fibrillation - initiated on amiodarone 200 mg BID by EP. Anticoagulation not started by EP given duration.    Leukocytosis - down trending, afebrile, nml procalcitonin.     Dispo - planned for TCU.    Juan Ng MD  [phone]    ------------------------------------------------------------------------------  Interval History   Patient with asymptomatic runs of narrow complex tachycardia ~ 180 BPM    Physical Exam   Temp: 97.6  F (36.4  C) Temp src: Oral BP: 117/85 Pulse: 83 Heart Rate: 70 Resp: 16 SpO2: 93 % O2 Device: Nasal cannula Oxygen Delivery: 1  LPM  Vitals:    04/28/17 0500 04/29/17 0420 04/30/17 0436   Weight: 67.2 kg (148 lb 2.4 oz) 66.7 kg (147 lb 0.8 oz) 66.3 kg (146 lb 1.6 oz)     Vital Signs with Ranges  Temp:  [97.2  F (36.2  C)-97.9  F (36.6  C)] 97.6  F (36.4  C)  Pulse:  [74-83] 83  Heart Rate:  [] 70  Resp:  [16-24] 16  BP: ()/(51-85) 117/85  SpO2:  [90 %-96 %] 93 %  I/O last 3 completed shifts:  In: 1100 [P.O.:960; I.V.:140]  Out: 1050 [Urine:1050]      Medications     Percutaneous Coronary Intervention orders placed (this is information for BPA alerting)         furosemide  40 mg Intravenous Daily     levofloxacin  750 mg Intravenous Q48H     amiodarone  200 mg Oral BID     metoprolol  12.5 mg Oral BID     insulin aspart  1-3 Units Subcutaneous TID AC     insulin aspart  1-3 Units Subcutaneous At Bedtime     sodium chloride (PF)  3 mL Intracatheter Q8H     aspirin EC  81 mg Oral Daily     ticagrelor  90 mg Oral Q12H     lisinopril  2.5 mg Oral Daily     atorvastatin  40 mg Oral Daily       Data   Results for orders placed or performed during the hospital encounter of 04/26/17 (from the past 24 hour(s))   Lactic acid level STAT   Result Value Ref Range    Lactic Acid 2.9 (H) 0.7 - 2.1 mmol/L   XR Chest Port 1 View    Narrative    XR CHEST PORT 1 VW  4/29/2017 4:50 PM     HISTORY:  interval exam    COMPARISON: Film dated 4/28/2017    FINDINGS:  Bilateral pleural effusions are again noted. These have not  changed significantly. Diffuse interstitial and alveolar infiltrates  are seen. These are greater on the right than the left. These have  improved slightly in the left lung since the previous film.      Impression    IMPRESSION:  1. No significant change in the pleural effusions.  2. Slightly improved infiltrate.    ALMAZ HENRIQUEZ MD   UA with Microscopic reflex to Culture   Result Value Ref Range    Color Urine Yellow     Appearance Urine Clear     Glucose Urine Negative NEG mg/dL    Bilirubin Urine Negative NEG    Ketones Urine  Negative NEG mg/dL    Specific Gravity Urine 1.016 1.003 - 1.035    Blood Urine Negative NEG    pH Urine 5.0 5.0 - 7.0 pH    Protein Albumin Urine 30 (A) NEG mg/dL    Urobilinogen mg/dL Normal 0.0 - 2.0 mg/dL    Nitrite Urine Negative NEG    Leukocyte Esterase Urine Negative NEG    Source Midstream Urine     WBC Urine 1 0 - 2 /HPF    RBC Urine <1 0 - 2 /HPF    Squamous Epithelial /HPF Urine <1 0 - 1 /HPF    Mucous Urine Present (A) NEG /LPF   Glucose by meter   Result Value Ref Range    Glucose 122 (H) 70 - 99 mg/dL   Lactic acid whole blood   Result Value Ref Range    Lactic Acid 3.6 (H) 0.7 - 2.1 mmol/L   US Upper Extremity Venous Duplex Left    Narrative    US UPPER EXTREMITY VENOUS DUPLEX LEFT 4/30/2017 12:15 AM    HISTORY: Pain in the antecubital area. Evaluate for abscess.    COMPARISON: None.    TECHNIQUE:  Venous Doppler US of the left upper extremity. Color flow  and spectral Doppler with waveform analysis performed.    FINDINGS: The deep veins in the left upper extremity are compressible  throughout. The deep veins demonstrate normal venous augmentation,  waveforms and color Doppler flow. The subclavian and internal jugular  veins demonstrate normal color Doppler flow without intraluminal  thrombus. Superficial thrombus is present in the left cephalic vein  extending from the level of the midhumerus to the antecubital fossa.  There is no evidence of abscess.      Impression    IMPRESSION:   1. No evidence of DVT.   2. No abscess.  3. Superficial clot in the left cephalic vein.    CAN MCCABE MD   Glucose by meter   Result Value Ref Range    Glucose 112 (H) 70 - 99 mg/dL   Basic metabolic panel   Result Value Ref Range    Sodium 138 133 - 144 mmol/L    Potassium 3.6 3.4 - 5.3 mmol/L    Chloride 99 94 - 109 mmol/L    Carbon Dioxide 32 20 - 32 mmol/L    Anion Gap 7 3 - 14 mmol/L    Glucose 120 (H) 70 - 99 mg/dL    Urea Nitrogen 37 (H) 7 - 30 mg/dL    Creatinine 1.17 0.66 - 1.25 mg/dL    GFR Estimate 60 (L)  >60 mL/min/1.7m2    GFR Estimate If Black 72 >60 mL/min/1.7m2    Calcium 8.3 (L) 8.5 - 10.1 mg/dL   CBC with platelets   Result Value Ref Range    WBC 13.5 (H) 4.0 - 11.0 10e9/L    RBC Count 4.42 4.4 - 5.9 10e12/L    Hemoglobin 13.4 13.3 - 17.7 g/dL    Hematocrit 39.9 (L) 40.0 - 53.0 %    MCV 90 78 - 100 fl    MCH 30.3 26.5 - 33.0 pg    MCHC 33.6 31.5 - 36.5 g/dL    RDW 15.4 (H) 10.0 - 15.0 %    Platelet Count 359 150 - 450 10e9/L   Procalcitonin   Result Value Ref Range    Procalcitonin 0.11 ng/ml   Lactic acid whole blood   Result Value Ref Range    Lactic Acid 1.3 0.7 - 2.1 mmol/L   Glucose by meter   Result Value Ref Range    Glucose 101 (H) 70 - 99 mg/dL

## 2017-05-01 NOTE — PROGRESS NOTES
[facility]  Cardiology Progress Note  Date of Service: 05/01/2017  Primary Cardiologist: Danna    Assessment & Plan    Matias Morel is a 81 year old male with past medical history significant for HTN, DM II, and dyslipidemia admitted on 4/26/2017 with chest pain and found to have an anterior STEMI.    Assessment:  1.  STEMI, anterior, s/p MARILYN to 2 to the proximal to mid LAD   - residual prox RCA at 70-80%, circ and lmain with mild disease   - on brillanta and asa   - large MI with trop peaking >200 and EF 30-35%   - remains on O2   - needs staged pci- will assess if repeat in 3 - 4 weeks or this stay     2.  Ischemic cardiomyopathy with acute systolic heart failure   - EF 30-35%   - cxr 4/29 showed bilateral effusions and diffuse edema    - lisinopril, lopressor initiated- will need toprol/ carvedilol    3.  Paroxysmal atrial fibrillation, new dx   - CHADS VASC 6 for (vasc dz, dm II, age, CHF, HTN)   - on amiodarone, seen by EP, given on DAPT and brief paroxysm, will not add third anticoagulant atpresent   - rhythm control approach- had 24h of IV amiodarone, now on lower dose  po having episodes of paf   - QTc 497 now-borderline prolonged    4.  DMII, has been diet controlled    5.  Hyperlipidemia, lipitor 40 initiated    6.  Acute renal insufficieny- normal cr on admit, peak at 1.35, improving to 1.17 although bun remains elevated at 35    7.  Leukocytosis, stress induced?, negative UA, no sign of pna on cxr   - on levofloxacin   - improving, will follow    8.  Superficial clot in the left cephalic vein, continue warm packs     Plan:   1. Restart amiodarone gtt at 1mg/min for the next 6h, then 0.5mg/min  2. Continue lasix 40 mg IV q12 additional dose of 20 mg IV now  3. D/c lopressor, start toprol 25 mg bid  4. Request IM review if ongoing levoquin necessary given long QT-discontinued  5. Wean O2 as able  6. RN to provide additional chf education    I have reviewed patient data, examined  patient, and agree with medical plan.    Roque Garcia MD [facility]      Zainab Cummins PA-C  [facility]  Pager: [phone]     Interval History   Denies sob, or chest pain.  Denies orthopnea or pnd.  Feels well, wants to discharge.  Lives alone no roommate, significant other or children.      Physical Exam   Temp: 98  F (36.7  C) Temp src: Oral BP: 123/73   Heart Rate: 112 Resp: 20 SpO2: 90 % O2 Device: Nasal cannula with humidification Oxygen Delivery: 2 LPM  Vitals:    04/29/17 0420 04/30/17 0436 05/01/17 0445   Weight: 66.7 kg (147 lb 0.8 oz) 66.3 kg (146 lb 1.6 oz) 67 kg (147 lb 11.3 oz)       Constitutional   alert and oriented, in no acute distress.  Skin   warm and dry to touch  Neck  3 cm JVP above sternal notch at 90d  Lungs diminished with rales in RLL  Cardiac regular with occasional ectopic beats, II/VI diastolic murmur  Extremities and Back   no edema.    Neurological   Normal speech    Medications     Percutaneous Coronary Intervention orders placed (this is information for BPA alerting)         furosemide  40 mg Intravenous BID     metoprolol  25 mg Oral BID     levofloxacin  750 mg Intravenous Q48H     amiodarone  200 mg Oral BID     insulin aspart  1-3 Units Subcutaneous TID AC     insulin aspart  1-3 Units Subcutaneous At Bedtime     sodium chloride (PF)  3 mL Intracatheter Q8H     aspirin EC  81 mg Oral Daily     ticagrelor  90 mg Oral Q12H     lisinopril  2.5 mg Oral Daily     atorvastatin  40 mg Oral Daily       Data   Most Recent 3 CBC's:  Recent Labs   Lab Test  05/01/17   0535 04/30/17   0520  04/29/17   0513  04/28/17   0548   WBC  13.0*  13.5*  14.6*  16.7*   HGB  13.8  13.4  12.8*  13.1*   MCV  89  90   --   90   PLT  383  359   --   282     Most Recent 3 BMP's:  Recent Labs   Lab Test  05/01/17   0535  04/30/17   0520  04/29/17   1425  04/29/17   0513   NA  139  138   --   137   POTASSIUM  3.6  3.6   --   3.6   CHLORIDE  101  99   --   100   CO2  29  32   --   31   BUN  35*  37*   --   40*    CR  1.17  1.17  1.27*  1.35*   ANIONGAP  9  7   --   6   ROBLES  8.3*  8.3*   --   8.2*   GLC  130*  120*   --   123*     Most Recent 3 Troponin's:  Recent Labs   Lab Test  17   0525  17   2207  17   1355   17   0347   TROPI  125.570*  >200.000  The 99th percentile for upper reference range is 0.045 ug/L.  Troponin values in   the range of 0.045 - 0.120 ug/L may be associated with risks of adverse   clinical events.   Previous critical documented  *  >200.000  The 99th percentile for upper reference range is 0.045 ug/L.  Troponin values in   the range of 0.045 - 0.120 ug/L may be associated with risks of adverse   clinical events.   Previous critical documented  *   < >   --    TROPONIN   --    --    --    --   14.44*    < > = values in this interval not displayed.     Most Recent 3 BNP's:No lab results found.  Last 24 hours labs reviewed   EKG: (reviewed personally) 17 afib with st elevation    Imagin17  Bilateral pleural effusions are again noted. These have not changed significantly. Diffuse interstitial and alveolar infiltrates are seen. These are greater on the right than the left. These have improved slightly in the left lung since the previous film.    Tele: sinus with brief run of afib noted.      Echo: 17  The left ventricle is normal in size.  There is mild to moderate concentric left ventricular hypertrophy.  The visual ejection fraction is estimated at 30-35%.  There is a large anteroapical segment of severe hypokinesis to akinesis, also  involving the distal inferolateral and lateral walls. Consistent with MI in  LAD territory.  Grade II or moderate diastolic dysfunction.  E by E prime ratio is greater than 15, that likely suggests increased left  ventricular filling pressures.  There is no comparison study available. The study was technically difficult.    Last ischemic eval: 17  ASSESSMENT:  1. Anterior STEMI with 100% thorm,botic occlusion of the prox LAD    2. Two vessel coronary artery disease (LAD and prox RCA)  3. Successful Percutaneous coronary intervention of the prox and mid LAD using overlapping 2.94e26xp and 3.0x24mm Promus Premier MARILYN, post dilated using a 3.25mm NC balloon. This was done after thrombus aspiration  4. Left ventricle with LVEDP of 21 mmHg, no evidence of aortic stenosis.  5. Sheath was secured in place.     PLAN:  1. Aspirin 81 mg po daily lifelong.  2. Ticagrelor 90 mg po bid for at least 1 year uninterrupted.  3. Bedrest per protocol.  4. Admit   5. Continued medical management and lifestyle modification for cardiovascular risk factor optimization.   6. Planned staged PCI of prox RCA    Device: none, possible candidate for ICD

## 2017-05-01 NOTE — PROGRESS NOTES
"SPIRITUAL HEALTH SERVICES  Spiritual Assessment Progress Note  [facility]    Length of stay visit.     PRIMARY FOCUS:      Support for coping    ILLNESS CIRCUMSTANCES:    Reviewed documentation. Reflective conversation shared with patient which integrated elements of illness and family narratives. Patient was sitting up in a chair  and welcomed my visit.       Context of Serious Illness/Symptom(s) - Patient came to [facility] with chest pain.  He states that he was mowing the yard and felt weak and then started to sweat when he was in bed that night.      Resources for Support -  The patient lives alone, he has had roommates over the years but he states that he is very independent.   .   DISTRESS:      Emotional, Existential/Relational Distress - Patient said that this was a surprise because he was feeling well.  He states that the recovery has been slower than he would like.     Spiritual/Sabianist Distress - He reports that he is \"a little bit Anabaptism\". Social/Cultural/Economic Distress- None discussed        SPIRITUAL/Tenriism COPING:      Scientologist/Grazyna - Zoroastrianism     Spiritual Practice(s) -  He likes to listen to TV willie.  He said that he thinks miracles can happen. He appreciated prayer today.     Emotional, Relational, Existential Connections - May need some support as he has been alone and independent.     GOALS OF CARE:    Goals of Care - Hopes to return home as he doesn't have anyone to mow his lawn.     Meaning/Sense-Making - Patient is feeling blessed that he was able to come in and have surgery.       PLAN:SH will follow for LOS, no immediate distress or need noted at this time.      Riya Veliz  Chaplain Resident  Pager [phone]                               "

## 2017-05-01 NOTE — PROGRESS NOTES
SW:  D:  Call placed to update [facility] as to patient's status and potential for discharge on Wednesday or Thursday.  Per Linsey, they are hopeful they will have a bed available for patient at that time.  P:  Will continue to follow.

## 2017-05-01 NOTE — PLAN OF CARE
Problem: Goal Outcome Summary  Goal: Goal Outcome Summary  Outcome: Improving  Pt denies pain or SOB. Was on RA w/stable VS. Tele SR. 1200 mL fluid restriction. Continue to diurese. Likely to DC to TCU 1-2 days. Continue to monitor.

## 2017-05-01 NOTE — PLAN OF CARE
Problem: Goal Outcome Summary  Goal: Goal Outcome Summary  Outcome: Therapy, progress towards functional goals is fair  CR: Pt required VC for some ADL's tasks at sink .  Was able to dress LE with out difficulty.  Pt ambulated 300 with WW PT  119/76  02  85-87% on  2L. Nursing made aware of . Per plan established by the Occupational Therapist, the recommended discharge location is TCU followed by OP CR.

## 2017-05-01 NOTE — PLAN OF CARE
Problem: Individualization  Goal: Patient Preferences  Outcome: No Change  Vss, a-febrile, denies chest pain, up with one assist to bathroom, diuresing well with lasix, pt had one episode of SVT 190s, trended down after oral Dilt and metoprolol, Amio gtt started at 1237 @ 60MLx 6hrs the will switch to 30 Ml, rate in the 70s  In SR, will continue to monitor.

## 2017-05-01 NOTE — PROGRESS NOTES
Met with pt this am re: his car is parked at [facility] and the pt does not have any family/friends to move it.  Attempted to call [employer] and have them tow it. They are unable due to the pt not knowing his member number and not being present at the time of the tow. Another called was placed to the [facility] Security to inform them of this car being the in the parking lot. Gave security the license plate [id number], [address] [facility]. They will be sure it does not get towed. Informed them it will be an uncertain amount of time before the pt can get to his car. Will continue to assist and follow for any future needs.

## 2017-05-01 NOTE — PLAN OF CARE
Problem: Goal Outcome Summary  Goal: Goal Outcome Summary  Outcome: No Change  VSS and WNL for this pt. Pt transfers with SBA, LS crackles on 1 LPM via nasal cannula and tele is SR/ SVT. Pt is A&O x 3 with forgetfulness, fluid restriction of 1200 mL and uses a urinal at bedside. Pt is on IV lasix for diuresing, denies pain and plan is for pt to discharge in 1 to 2 days. Continue POC, will pass on and continue to monitor.

## 2017-05-01 NOTE — PROGRESS NOTES
[facility]  Hospitalist Progress Note        Andriy Cristel Steve,   05/01/2017        Interval History:      Patient states that he is doing well. He is worried about his car which is at [facility].          Assessment and Plan:        81-year-old male with a past medical history significant for diet-controlled type 2 diabetes, hypertension, hyperlipidemia, BPH, newly diagnosed 2-vessel coronary artery disease with acute anterior ST elevation myocardial infarction occurring on 04/26/2017 who is being evaluated for diabetic management.       Newly diagnosed 2-vessel CAD in the setting of acute anterior STEMI occurring on 04/26/2017 with associated hypertension, hyperlipidemia: s/p successful PCI to the proximal and mid LAD with overlapping drug-eluting stent placement. lipid profile showed a cholesterol of 191, HDL 40, LDL of 138, non-HDL cholesterol of 151 and triglycerides of 64.   - mgt per cardiology   - aspirin 81 mg daily   - Brilinta    - Metoprolol.    - lisinopril    - atorvastatin       Acute hypoxic respiratory failure secondary to acute decompensated HFrEF / ischemic cardiomyopathy Echo on 4/26 shows mild to moderated LVH with EF of 30-35% with large anteroapical segment of severe hypokinesis to akinesis, also involing distal inferolateral and lateral walls, grade II diastolic heart failure.   - Fluid restrict.   - Repeat echo in 3 months. ICD if EF <35%      Paroxysmal Afib with RVR on 4/27 started on amio drip > converted to PO loading.    - Amiodarone.    - No need for OAC at this point given long duration and the presence of DAPT.      Leukocytosis, suspicion for bronchitis- noted and suspect secondary to AMI. No symptoms to suggest acute infection. No fevers chills, pleuritic chest pain. Reviewed CXR and bilateral patchy infiltrates with pleural effusions most c/w CHF.    - Improving. Procalcitonin.    - Levaquin discontinued.       Diet-controlled type 2 diabetes with stress  "hyperglycemia around MI: A1c of 6.3 on ,   - diabetic diet      History of BPH: s/p TURP procedure.    - Stable       Deep venous thrombosis prophylaxis: PCDs.      CODE: full code.       Dispo: Ultimately per cardiology. Plan for TCU on discharge.                    Physical Exam:      Heart Rate: 112    Blood pressure 123/73, pulse 83, temperature 98  F (36.7  C), temperature source Oral, resp. rate 20, height 1.651 m (5' 5\"), weight 67 kg (147 lb 11.3 oz), SpO2 90 %.    Vitals:    17 0420 17 0436 17 0445   Weight: 66.7 kg (147 lb 0.8 oz) 66.3 kg (146 lb 1.6 oz) 67 kg (147 lb 11.3 oz)       Vital Sign Ranges  Temperature Temp  Av.5  F (36.4  C)  Min: 97  F (36.1  C)  Max: 98  F (36.7  C)   Blood pressure Systolic (24hrs), Av , Min:89 , Max:123        Diastolic (24hrs), Av, Min:53, Max:85      Pulse No Data Recorded   Respirations Resp  Av.3  Min: 16  Max: 20   Pulse oximetry SpO2  Av %  Min: 90 %  Max: 96 %     Vital Signs with Ranges  Temp:  [97  F (36.1  C)-98  F (36.7  C)] 98  F (36.7  C)  Heart Rate:  [] 112  Resp:  [16-20] 20  BP: ()/(53-85) 123/73  SpO2:  [90 %-96 %] 90 %    I/O Last 3 Shifts:   I/O last 3 completed shifts:  In: 730 [P.O.:730]  Out: 1725 [Urine:1725]    I/O past 24 hours:     Intake/Output Summary (Last 24 hours) at 17  Last data filed at 17   Gross per 24 hour   Intake              730 ml   Output             1425 ml   Net             -695 ml     GENERAL: Alert and oriented. NAD. Conversational, appropriate.   HEENT: Normocephalic. EOMI. No icterus or injection. Nares normal.   LUNGS: Crackles improved from previous. No dyspnea at rest.   HEART: Regular rate. Extremities perfused.   ABDOMEN: Soft, nontender, and nondistended. Positive bowel sounds.   EXTREMITIES: No LE edema noted.   NEUROLOGIC: Moves extremities x4 on command. No acute focal neurologic abnormalities noted.          Prior to Admission " Medications:        Prescriptions Prior to Admission   Medication Sig Dispense Refill Last Dose     calcium carbonate (TUMS) 500 MG chewable tablet Take 1 chew tab by mouth every other day   4/25/2017 at Unknown time     ACCU-CHEK SHARMIN VI STRP test 1 time per day 1 month prn      ACCU-CHEK SHARMIN VI SOLN use as directed and discard after 3 months 1 prn      ACCU-CHEK MULTICLIX LANCETS MISC test 1 time per day 1month prn             Medications:        Current Facility-Administered Medications   Medication Last Rate     Percutaneous Coronary Intervention orders placed (this is information for BPA alerting)       Current Facility-Administered Medications   Medication Dose Route Frequency     furosemide  40 mg Intravenous BID     metoprolol  25 mg Oral BID     levofloxacin  750 mg Intravenous Q48H     amiodarone  200 mg Oral BID     insulin aspart  1-3 Units Subcutaneous TID AC     insulin aspart  1-3 Units Subcutaneous At Bedtime     sodium chloride (PF)  3 mL Intracatheter Q8H     aspirin EC  81 mg Oral Daily     ticagrelor  90 mg Oral Q12H     lisinopril  2.5 mg Oral Daily     atorvastatin  40 mg Oral Daily     Current Facility-Administered Medications   Medication Dose Route Frequency     metoprolol  2.5-5 mg Intravenous Q4H PRN     glucose  15-30 g Oral Q15 Min PRN    Or     dextrose  25-50 mL Intravenous Q15 Min PRN    Or     glucagon  1 mg Subcutaneous Q15 Min PRN     lidocaine  1 mL Other Q1H PRN     lidocaine 4%   Topical Q1H PRN     sodium chloride (PF)  3 mL Intracatheter Q1H PRN     nitroglycerin  0.4 mg Sublingual Q5 Min PRN     acetaminophen  325-650 mg Oral Q4H PRN     HYDROcodone-acetaminophen  1-2 tablet Oral Q4H PRN     naloxone  0.1-0.4 mg Intravenous Q2 Min PRN     Percutaneous Coronary Intervention orders placed (this is information for BPA alerting)   Does not apply DOES NOT GO TO MAR            Data:      Lab data reviewed.     Recent Labs  Lab 05/01/17  0535 04/30/17  0520 04/29/17  1917  04/29/17  0513 04/28/17  0548 04/27/17  0525 04/26/17  2207 04/26/17  1355  04/26/17  0347 04/26/17  0340   HGB 13.8 13.4  --  12.8* 13.1* 14.8  --   --   --   --  15.6   MCV 89 90  --   --  90 91  --   --   --   --  92    359  --   --  282 313  --   --   --   --  402   INR  --   --   --   --   --   --   --   --   --   --  0.96    138  --  137 138 141  --   --   --   --  139   POTASSIUM 3.6 3.6  --  3.6 3.9 4.4  --   --   --   --  3.8   CHLORIDE 101 99  --  100 102 105  --   --   --   --  103   CO2 29 32  --  31 27 28  --   --   --   --  28   BUN 35* 37*  --  40* 33* 18  --   --   --   --  13   CR 1.17 1.17 1.27* 1.35* 1.19 0.96  --   --   --   --  0.83   ANIONGAP 9 7  --  6 9 8  --   --   --   --  8   ROBLES 8.3* 8.3*  --  8.2* 8.3* 8.6  --   --   --   --  8.8   * 120*  --  123* 128* 153*  --   --   --   --  187*   TROPI  --   --   --   --   --  125.570* >200.000The 99th percentile for upper reference range is 0.045 ug/L.  Troponin values in the range of 0.045 - 0.120 ug/L may be associated with risks of adverse clinical events. Previous critical documented* >200.000The 99th percentile for upper reference range is 0.045 ug/L.  Troponin values in the range of 0.045 - 0.120 ug/L may be associated with risks of adverse clinical events. Previous critical documented*  < >  --  29.002*   TROPONIN  --   --   --   --   --   --   --   --   --  14.44*  --    < > = values in this interval not displayed.        Imaging:      Imaging data reviewed.     Dr. Andriy Wilson D.O.  [facility]  Pager [phone]

## 2017-05-02 NOTE — PLAN OF CARE
Problem: Goal Outcome Summary  Goal: Goal Outcome Summary  Outcome: Therapy, progress toward functional goals is gradual  Pt SBP in mid 90's twice throughout shift, other vital signs stable. Amio gtt at 0.5 mg/hr. Pt denies pain and SOB, oxygen stable on 1L NC. Tele SR. Right groin site CDI. Receiving IV Lasix as ordered, good urine output overnight. Will continue to monitor.

## 2017-05-02 NOTE — PROVIDER NOTIFICATION
MD Notification    Notified Persons Name: Dr. Morton    Notification Date/Time: 05/02/17 0620    Notification Interaction:  MD notified    Purpose of Notification: K 3.3    Orders Received: Yes    Comments: Will follow K replacement protocol

## 2017-05-02 NOTE — PROGRESS NOTES
A/O. BP elevated - scheduled metoprolol given. Up with one to chair for dinner. Denies CP/SOB. Diuresing well on lasix. Amio gtt at 0.5 mg/min, HR 80's. Tele dysrrhythmia w frequent PACs. Continue to monitor.

## 2017-05-02 NOTE — PROGRESS NOTES
Pt had 2 IV site infiltrate/ extravasation. Left AC and right middle forearm. Pt states that right IV site is painful. Both sites are red and skin if firm. Amiodarone was stopped and switched to PO. Hot pack was applied. MD notified.

## 2017-05-02 NOTE — PLAN OF CARE
Problem: Goal Outcome Summary  Goal: Goal Outcome Summary  Outcome: No Change  VSS. Oriented but forgetful. Tele: SR with episodes of SVT. BP soft this afternoon, but improved. 150mg bolus of amio IV was given and per cardiology HS amio PO was given early. Pt has nose bleed this AM which has since stopped. IV site in right mid forearm extravasated,MD aware. Hot pack applied. Lasix was switched to 40 mg BID- voiding well.potassium replaced, recheck in AM.  Will continue to monitor.

## 2017-05-02 NOTE — PROGRESS NOTES
Met with patient regarding establishment of new PCP.  Patient stated he would like to see someone in the [facility].  F/U appt scheduled and added to AVS with Dr. Kee.

## 2017-05-02 NOTE — PROGRESS NOTES
[facility]  Cardiology Progress Note  Date of Service: 05/02/2017  Primary Cardiologist: Danna    Assessment & Plan    Matias Morel is a 81 year old male with past medical history significant for HTN, DM II, and dyslipidemia admitted on 4/26/2017 with chest pain and found to have an anterior STEMI.    Assessment:  1.  STEMI, anterior, s/p MARILYN to 2 to the proximal to mid LAD   - residual prox RCA at 70-80%, circ and LM with mild disease   - on brillanta and asa   - large MI with trop peaking >200 and EF 30-35%   - remains on O2   - needs staged pci- Likely in 3-4 weeks depending upon clinical stability, unless develops anginal symptoms.     2.  Ischemic cardiomyopathy with acute systolic heart failure-slow improvement with diuresis-will try to switch IV lasix to PO over next 24-48 hours.   - EF 30-35%   - cxr 4/29 showed bilateral effusions and diffuse edema    - lisinopril, Toprol XL initiated    3.  Paroxysmal atrial fibrillation, new dx   - CHADS VASC 6 for (vasc dz, dm II, age, CHF, HTN)   - on amiodarone, seen by EP, given on DAPT and brief paroxysm, recommended not adding a third anticoagulant at present   - rhythm control approach- as of 5/2 has received ~4g load, will change to PO amio at 400 mg BID for likely another 7-10 days, then 200mg BID for 7-10 days, then 200mg qday   - QTc 497 was borderline prolonged, but improved to ~450ms on EKG 5/2    4.  DMII, has been diet controlled    5.  Hyperlipidemia, lipitor 40mg initiated    6.  Acute renal insufficieny- normal cr on admit, peak at 1.35, now stable ~1.1-1.2    7.  Leukocytosis, stress induced?, negative UA, no sign of pna on cxr     8.  Superficial clot in the left cephalic vein, continue warm packs     Plan:   1. Complete amiodarone gtt, then change to PO amio (see above)  2. Continue lasix 60 mg IV q12 for now.  3. Wean O2 as able  4. RN to provide additional chf education/ Will order nutrition consult to discuss low sodium  diet  5. Cardiac rehab as able.    Will discuss with Dr Garcia.    I have reviewed patient data, examined patient, and agree with medical plan.    Roque Garcia MD [facility]    Karrie Jha PA-C       Interval History   Denies sob, or chest pain. O2 requirements improving. Feels well, wants to discharge.  Lives alone no roommate, significant other or children.      Physical Exam   Temp: 97.6  F (36.4  C) Temp src: Oral BP: 120/68   Heart Rate: 79 Resp: 16 SpO2: 92 % O2 Device: Nasal cannula Oxygen Delivery: 2 LPM  Vitals:    04/30/17 0436 05/01/17 0445 05/02/17 0500   Weight: 66.3 kg (146 lb 1.6 oz) 67 kg (147 lb 11.3 oz) 64.8 kg (142 lb 13.7 oz)     Constitutional   alert and oriented, in no acute distress.  Skin   warm and dry to touch  Neck  3 cm JVP above sternal notch at 90d   Lungs diminished with rales in RLL  Cardiac regular with occasional ectopic beats, II/VI diastolic murmur  Extremities and Back   no edema.    Neurological   Normal speech    Medications     amiodarone (CORDARONE) infusion ADULT 0.5 mg/min (05/02/17 0834)     Percutaneous Coronary Intervention orders placed (this is information for BPA alerting)         metoprolol  25 mg Oral BID     furosemide  60 mg Intravenous BID     lisinopril  2.5 mg Oral BID     insulin aspart  1-3 Units Subcutaneous TID AC     insulin aspart  1-3 Units Subcutaneous At Bedtime     sodium chloride (PF)  3 mL Intracatheter Q8H     aspirin EC  81 mg Oral Daily     ticagrelor  90 mg Oral Q12H     atorvastatin  40 mg Oral Daily       Data   Most Recent 3 CBC's:  Recent Labs   Lab Test  05/02/17   0545  05/01/17   0535  04/30/17   0520   WBC  13.2*  13.0*  13.5*   HGB  13.3  13.8  13.4   MCV  89  89  90   PLT  433  383  359     Most Recent 3 BMP's:  Recent Labs   Lab Test  05/02/17   0545  05/01/17   0535  04/30/17   0520   NA  140  139  138   POTASSIUM  3.3*  3.6  3.6   CHLORIDE  102  101  99   CO2  29  29  32   BUN  37*  35*  37*   CR  1.19  1.17  1.17    ANIONGAP  9  9  7   ROBLES  8.4*  8.3*  8.3*   GLC  130*  130*  120*     Most Recent 3 Troponin's:  Recent Labs   Lab Test  17   0525  17   2207  17   1355   17   0347   TROPI  125.570*  >200.000  The 99th percentile for upper reference range is 0.045 ug/L.  Troponin values in   the range of 0.045 - 0.120 ug/L may be associated with risks of adverse   clinical events.   Previous critical documented  *  >200.000  The 99th percentile for upper reference range is 0.045 ug/L.  Troponin values in   the range of 0.045 - 0.120 ug/L may be associated with risks of adverse   clinical events.   Previous critical documented  *   < >   --    TROPONIN   --    --    --    --   14.44*    < > = values in this interval not displayed.     Most Recent 3 BNP's:No lab results found.  Last 24 hours labs reviewed   EKG: (reviewed personally) SR, Q waves in V1-V4, QTc improved to 450ms.    Imagin17  Bilateral pleural effusions are again noted. These have not changed significantly. Diffuse interstitial and alveolar infiltrates are seen. These are greater on the right than the left. These have improved slightly in the left lung since the previous film.    Tele: SR    Echo: 17  The left ventricle is normal in size.  There is mild to moderate concentric left ventricular hypertrophy.  The visual ejection fraction is estimated at 30-35%.  There is a large anteroapical segment of severe hypokinesis to akinesis, also  involving the distal inferolateral and lateral walls. Consistent with MI in  LAD territory.  Grade II or moderate diastolic dysfunction.  E by E prime ratio is greater than 15, that likely suggests increased left  ventricular filling pressures.  There is no comparison study available. The study was technically difficult.    Last ischemic eval: 17  ASSESSMENT:  1. Anterior STEMI with 100% thorm,botic occlusion of the prox LAD   2. Two vessel coronary artery disease (LAD and prox RCA)  3.  Successful Percutaneous coronary intervention of the prox and mid LAD using overlapping 2.44m70ki and 3.0x24mm Promus Premier MARILYN, post dilated using a 3.25mm NC balloon. This was done after thrombus aspiration  4. Left ventricle with LVEDP of 21 mmHg, no evidence of aortic stenosis.  5. Sheath was secured in place.     PLAN:  1. Aspirin 81 mg po daily lifelong.  2. Ticagrelor 90 mg po bid for at least 1 year uninterrupted.  3. Bedrest per protocol.  4. Admit   5. Continued medical management and lifestyle modification for cardiovascular risk factor optimization.   6. Planned staged PCI of prox RCA    Device: none, possible candidate for ICD in the future

## 2017-05-02 NOTE — PROVIDER NOTIFICATION
Brief update:    Mild hypokalemia    K and mag replacement protocols ordered.    Dixon Morton MD  6:22 AM

## 2017-05-02 NOTE — PROGRESS NOTES
[facility]  Hospitalist Progress Note        Andriy Wilson,   05/02/2017        Interval History:      Patient reporting nose bleed this am. Discussed plan of care with patient, dependent on cardiology plans.          Assessment and Plan:        81-year-old male with a past medical history significant for diet-controlled type 2 diabetes, hypertension, hyperlipidemia, BPH, newly diagnosed 2-vessel coronary artery disease with acute anterior ST elevation myocardial infarction occurring on 04/26/2017 who is being evaluated for diabetic management.       Newly diagnosed 2-vessel CAD in the setting of acute anterior STEMI occurring on 04/26/2017 with associated hypertension, hyperlipidemia: s/p successful PCI to the proximal and mid LAD with overlapping drug-eluting stent placement. lipid profile showed a cholesterol of 191, HDL 40, LDL of 138, non-HDL cholesterol of 151 and triglycerides of 64.   - mgt per cardiology   - aspirin 81 mg daily   - Brilinta    - Metoprolol.    - lisinopril    - atorvastatin    - May need staged PCI, defer to cardiology.       Acute hypoxic respiratory failure secondary to acute decompensated HFrEF / ischemic cardiomyopathy Echo on 4/26 shows mild to moderated LVH with EF of 30-35% with large anteroapical segment of severe hypokinesis to akinesis, also involing distal inferolateral and lateral walls, grade II diastolic heart failure.    - Diuresis per cardiology.    - Repeat echo in 3 months. ICD if EF <35%      Paroxysmal Afib with RVR on 4/27 started on amio drip > converted to PO loading.    - Amiodarone.    - No need for OAC at this point given long duration and the presence of DAPT.      Leukocytosis, suspicion for bronchitis- noted and suspect secondary to AMI. No symptoms to suggest acute infection. No fevers chills, pleuritic chest pain. Reviewed CXR and bilateral patchy infiltrates with pleural effusions most c/w CHF.    - Procalcitonin returned at 0.08.  "   - Levaquin discontinued.       Diet-controlled type 2 diabetes with stress hyperglycemia around MI: A1c of 6.3 on ,   - diabetic diet      History of BPH: s/p TURP procedure.    - Stable       Deep venous thrombosis prophylaxis: PCDs.      CODE: full code.       Dispo: Ultimately per cardiology. Plan for TCU on discharge.                    Physical Exam:      Heart Rate: 79    Blood pressure (!) 129/105, pulse 83, temperature 97.6  F (36.4  C), temperature source Oral, resp. rate 16, height 1.651 m (5' 5\"), weight 64.8 kg (142 lb 13.7 oz), SpO2 92 %.    Vitals:    17 0436 17 0445 17 0500   Weight: 66.3 kg (146 lb 1.6 oz) 67 kg (147 lb 11.3 oz) 64.8 kg (142 lb 13.7 oz)       Vital Sign Ranges  Temperature Temp  Av.9  F (36.6  C)  Min: 97.6  F (36.4  C)  Max: 98.2  F (36.8  C)   Blood pressure Systolic (24hrs), Av , Min:94 , Max:129        Diastolic (24hrs), Av, Min:57, Max:105      Pulse No Data Recorded   Respirations Resp  Av.3  Min: 16  Max: 18   Pulse oximetry SpO2  Av.4 %  Min: 90 %  Max: 97 %     Vital Signs with Ranges  Temp:  [97.6  F (36.4  C)-98.2  F (36.8  C)] 97.6  F (36.4  C)  Heart Rate:  [69-79] 79  Resp:  [16-18] 16  BP: ()/() 129/105  SpO2:  [90 %-97 %] 92 %    I/O Last 3 Shifts:   I/O last 3 completed shifts:  In: 592 [P.O.:320; I.V.:272]  Out: 905 [Urine:905]    I/O past 24 hours:     Intake/Output Summary (Last 24 hours) at 17 0903  Last data filed at 17 0300   Gross per 24 hour   Intake              592 ml   Output              905 ml   Net             -313 ml     GENERAL: Alert and oriented. NAD. Conversational, appropriate.   HEENT: Normocephalic. EOMI. No icterus or injection. Nares with dried blood.   LUNGS: Crackles improving. No dyspnea at rest.   HEART: Regular rate. Extremities perfused.   ABDOMEN: Soft, nontender, and nondistended. Positive bowel sounds.   EXTREMITIES: No LE edema noted.   NEUROLOGIC: Moves " extremities x4 on command. No acute focal neurologic abnormalities noted.          Prior to Admission Medications:        Prescriptions Prior to Admission   Medication Sig Dispense Refill Last Dose     calcium carbonate (TUMS) 500 MG chewable tablet Take 1 chew tab by mouth every other day   4/25/2017 at Unknown time     ACCU-CHEK SHARMIN VI STRP test 1 time per day 1 month prn      ACCU-CHEK SHARMIN VI SOLN use as directed and discard after 3 months 1 prn      ACCU-CHEK MULTICLIX LANCETS MISC test 1 time per day 1month prn             Medications:        Current Facility-Administered Medications   Medication Last Rate     amiodarone (CORDARONE) infusion ADULT 0.5 mg/min (05/02/17 0834)     Percutaneous Coronary Intervention orders placed (this is information for BPA alerting)       Current Facility-Administered Medications   Medication Dose Route Frequency     metoprolol  25 mg Oral BID     furosemide  60 mg Intravenous BID     lisinopril  2.5 mg Oral BID     insulin aspart  1-3 Units Subcutaneous TID AC     insulin aspart  1-3 Units Subcutaneous At Bedtime     sodium chloride (PF)  3 mL Intracatheter Q8H     aspirin EC  81 mg Oral Daily     ticagrelor  90 mg Oral Q12H     atorvastatin  40 mg Oral Daily     Current Facility-Administered Medications   Medication Dose Route Frequency     potassium chloride  20-40 mEq Oral Q2H PRN     potassium chloride  20-40 mEq Oral or Feeding Tube Q2H PRN     potassium chloride  10 mEq Intravenous Q1H PRN     potassium chloride with lidocaine  10 mEq Intravenous Q1H PRN     potassium chloride  20 mEq Intravenous Q1H PRN     magnesium sulfate  4 g Intravenous Q4H PRN     metoprolol  2.5-5 mg Intravenous Q4H PRN     glucose  15-30 g Oral Q15 Min PRN    Or     dextrose  25-50 mL Intravenous Q15 Min PRN    Or     glucagon  1 mg Subcutaneous Q15 Min PRN     lidocaine  1 mL Other Q1H PRN     lidocaine 4%   Topical Q1H PRN     sodium chloride (PF)  3 mL Intracatheter Q1H PRN      nitroglycerin  0.4 mg Sublingual Q5 Min PRN     acetaminophen  325-650 mg Oral Q4H PRN     HYDROcodone-acetaminophen  1-2 tablet Oral Q4H PRN     naloxone  0.1-0.4 mg Intravenous Q2 Min PRN     Percutaneous Coronary Intervention orders placed (this is information for BPA alerting)   Does not apply DOES NOT GO TO MAR            Data:      Lab data reviewed.     Recent Labs  Lab 05/02/17  0545 05/01/17  0535 04/30/17  0520  04/27/17  0525 04/26/17  2207 04/26/17  1355  04/26/17  0347 04/26/17  0340   HGB 13.3 13.8 13.4  < > 14.8  --   --   --   --  15.6   MCV 89 89 90  < > 91  --   --   --   --  92    383 359  < > 313  --   --   --   --  402   INR  --   --   --   --   --   --   --   --   --  0.96    139 138  < > 141  --   --   --   --  139   POTASSIUM 3.3* 3.6 3.6  < > 4.4  --   --   --   --  3.8   CHLORIDE 102 101 99  < > 105  --   --   --   --  103   CO2 29 29 32  < > 28  --   --   --   --  28   BUN 37* 35* 37*  < > 18  --   --   --   --  13   CR 1.19 1.17 1.17  < > 0.96  --   --   --   --  0.83   ANIONGAP 9 9 7  < > 8  --   --   --   --  8   ROBLES 8.4* 8.3* 8.3*  < > 8.6  --   --   --   --  8.8   * 130* 120*  < > 153*  --   --   --   --  187*   TROPI  --   --   --   --  125.570* >200.000The 99th percentile for upper reference range is 0.045 ug/L.  Troponin values in the range of 0.045 - 0.120 ug/L may be associated with risks of adverse clinical events. Previous critical documented* >200.000The 99th percentile for upper reference range is 0.045 ug/L.  Troponin values in the range of 0.045 - 0.120 ug/L may be associated with risks of adverse clinical events. Previous critical documented*  < >  --  29.002*   TROPONIN  --   --   --   --   --   --   --   --  14.44*  --    < > = values in this interval not displayed.        Imaging:      Imaging data reviewed.     Dr. Andriy Wilson D.O.  [facility]  Pager [phone]

## 2017-05-02 NOTE — PLAN OF CARE
Problem: Goal Outcome Summary  Goal: Goal Outcome Summary  Outcome: Therapy, progress towards functional goals is fair   CR Attempted Pt on TM did not appear steady ambulated in baltazar  BP  Had stable cardiac response. 139/88   HR 90   Afib   CGA , Sats 93 %  On 2 L   Recommend TCU followed by Out PT cardiac rehab ([facility]).

## 2017-05-03 NOTE — PROGRESS NOTES
[facility]    Hospitalist Progress Note    Date of Service (when I saw the patient): 05/03/2017    Assessment & Plan   Mr. Morel is a markedly pleasant 81 year old gentleman with Type 2 Diabetes mellitus, hypertension, dyslipidemia, and BPH status post TURP who was admitted 4/26/2017 for acute anterior STEMI. Hospitalists are consulted for co-management of Diabetes and cellulitis.    1) Acute anterior STEMI causing acute systolic ischemic CHF exacerbation: Mr. Morel presented to the [facility] ED for evaluation of chest pain, was found to have anterior STEMI, and transferred here for emergent catheterization. He was found to have severe LAD disease as the culprit lesion and underwent successful PCI with placement of two overlapping drug eluting stents there. He was also found to have occluded RCA. Troponin markedly high. TTE showed EF 30-35% with multiple severe wall motion abnormalities. LDL was 138, HDL 40. IV diuresis, aspirin, Brilinta, Metoprolol, and Lisinopril started.    -- I have discussed his case today with Dr. Garcia of Cardiology.    -- Plan for further IV diuresis today as guided by Cardiology    -- Plan for outpatient consideration of staged intervention, or possible ICD placement if repeat TTE shows persistently diminished LVEF.    2) Suspected right upper extremity cellulitis: Seen surrounding IV infusion site this morning. He hs had leukocytosis but no other signs of SIRS at present.    -- Will start Ancef today, plan for 10-14 days total antibiotic therapy transitioning soon to oral cephalosporin if his rash responds to therapy    3) New onset atrial fibrillation with RVR: [id number] score is 6. EP consulted and Amiodarone started.    -- Amiodarone continued as per Cardiology. Plan for now for DAPT for stroke prevention.    4) Type 2 Diabetes mellitus: Diet controlled as an outpatient. A1c 6.3.     -- ISS insulin while hospitalized    5) FRANCY: Improved, monitor daily    8)  Superficial clot in the left cephalic vein: We continue compresses    DVT Prophylaxis: Pneumatic Compression Devices  Code Status: Full Code    Disposition: Expected discharge as per Cardiology, perhaps Friday    Jorden Nielsen MD    Interval History   Feels well this morning. Has some mild tenderness at the site of some redness in his arm near infusion site. Denies fever, chills, or chest pain or dyspnea or other acute complaints.    -Data reviewed today: I reviewed all new labs and imaging results over the last 24 hours. I personally reviewed no images or EKG's today.    Physical Exam   Temp: (P) 97  F (36.1  C) Temp src: (P) Oral BP: (P) 114/67 Pulse: 76 Heart Rate: (P) 77 Resp: (P) 18 SpO2: 91 % O2 Device: Nasal cannula Oxygen Delivery: 2 LPM  Vitals:    05/01/17 0445 05/02/17 0500 05/03/17 0533   Weight: 67 kg (147 lb 11.3 oz) 64.8 kg (142 lb 13.7 oz) 64.6 kg (142 lb 6.7 oz)     Vital Signs with Ranges  Temp:  [97  F (36.1  C)-98  F (36.7  C)] (P) 97  F (36.1  C)  Pulse:  [76] 76  Heart Rate:  [] (P) 77  Resp:  [16-18] (P) 18  BP: ()/(54-72) (P) 114/67  SpO2:  [90 %-95 %] 91 %  I/O last 3 completed shifts:  In: 1420 [P.O.:1420]  Out: 990 [Urine:990]    Constitutional: Alert and oriented to person, place and time; no apparent distress  HEENT: normocephalic moist mucus membranes  Respiratory: lungs clear to auscultation bilaterally  Cardiovascular: regular S1 S2 no murmurs rubs or gallops  GI: abdomen soft non tender non distended bowel sounds positive  Skin: RUE with some mild streaky erythema over the anterior forearm; LUE with a small hematoma; good turgor  Musculoskeletal: no clubbing, cyanosis or edema  Neuro: EOMI; moves all four extremities  Psych: Appropriate affect, insight and judgment      Medications     Percutaneous Coronary Intervention orders placed (this is information for BPA alerting)         ceFAZolin  1 g Intravenous Q8H     amiodarone  400 mg Oral BID     furosemide  40 mg  Intravenous BID     metoprolol  25 mg Oral BID     lisinopril  2.5 mg Oral BID     insulin aspart  1-3 Units Subcutaneous TID AC     insulin aspart  1-3 Units Subcutaneous At Bedtime     sodium chloride (PF)  3 mL Intracatheter Q8H     aspirin EC  81 mg Oral Daily     ticagrelor  90 mg Oral Q12H     atorvastatin  40 mg Oral Daily       Data     Recent Labs  Lab 05/03/17  0637 05/02/17  0545 05/01/17  0535  04/27/17  0525 04/26/17  2207 04/26/17  1355   WBC 14.7* 13.2* 13.0*  < > 20.2*  --   --    HGB 13.6 13.3 13.8  < > 14.8  --   --    MCV 89 89 89  < > 91  --   --    * 433 383  < > 313  --   --     140 139  < > 141  --   --    POTASSIUM 3.8 3.3* 3.6  < > 4.4  --   --    CHLORIDE 103 102 101  < > 105  --   --    CO2 31 29 29  < > 28  --   --    BUN 36* 37* 35*  < > 18  --   --    CR 1.15 1.19 1.17  < > 0.96  --   --    ANIONGAP 7 9 9  < > 8  --   --    ROBLES 8.3* 8.4* 8.3*  < > 8.6  --   --    * 130* 130*  < > 153*  --   --    TROPI  --   --   --   --  125.570* >200.000The 99th percentile for upper reference range is 0.045 ug/L.  Troponin values in the range of 0.045 - 0.120 ug/L may be associated with risks of adverse clinical events. Previous critical documented* >200.000The 99th percentile for upper reference range is 0.045 ug/L.  Troponin values in the range of 0.045 - 0.120 ug/L may be associated with risks of adverse clinical events. Previous critical documented*   < > = values in this interval not displayed.    No results found for this or any previous visit (from the past 24 hour(s)).

## 2017-05-03 NOTE — PLAN OF CARE
Problem: Goal Outcome Summary  Goal: Goal Outcome Summary  Outcome: Improving  Pt has been pain free with V/signs stable and maintaining his O2 sats in the mid 90's on 2 L per N/C with Humidly. However when working with C rehab his O2 sats did drop and Pt became very tired so Pt was placed back to bed.  Ancef started for Rt arm infiltrated IV site secondary to Amiodarone. Remains in NSR. Planning DC to TCU poss this Friday.

## 2017-05-03 NOTE — PROGRESS NOTES
[facility]  Cardiology Progress Note  Date of Service: 05/03/2017  Primary Cardiologist: Danna    Assessment & Plan    Matias Morel is a 81 year old male with past medical history significant for HTN, DM II, and dyslipidemia admitted on 4/26/2017 with chest pain and found to have an anterior STEMI.    Assessment:  1.  STEMI, anterior, s/p MARILYN to 2 to the proximal to mid LAD   - residual prox RCA at 70-80%, circ and LM with mild disease   - on brillanta and asa   - large MI with trop peaking >200 and EF 30-35%   - remains on O2   - needs staged pci- Likely in 3-4 weeks depending upon clinical stability, unless develops anginal symptoms.     2.  Ischemic cardiomyopathy with acute systolic heart failure-slow improvement with diuresis-will try to switch IV lasix to PO over next 24-48 hours.   - EF 30-35%   - cxr 4/29 showed bilateral effusions and diffuse edema-persistent effusions on CXR today(5/3)-may consider possible thoracentesis    - lisinopril, Toprol XL initiated- bp are soft no room to uptitrate    3.  Paroxysmal atrial fibrillation, new dx   - CHADS VASC 6 for (vasc dz, dm II, age, CHF, HTN)   - on amiodarone, seen by EP, given on DAPT and brief paroxysm, recommended not adding a third anticoagulant at present   - rhythm control approach- as of 5/2 has received ~4g load, will change to PO amio at 400 mg BID for likely another 7 days, then 200mg BID for 7 days, then 200mg qday   - QTc 497 was borderline prolonged, but improved to ~450ms on EKG 5/2    4.  DMII, has been diet controlled    5.  Hyperlipidemia, lipitor 40mg initiated    6.  Acute renal insufficieny- normal cr on admit, peak at 1.35, now stable ~1.1-1.2    7.  Leukocytosis, stress induced?, negative UA, no sign of pna on cxr     8.  Superficial clot in the left cephalic vein, continue warm packs; also right forearm erythema/swelling at Iv site-?cellulitis or thrombophlebitis related to IV amiodarone infiltration.     Plan:    1. cxr pa and lateral- breath sounds worsening, still requiring O2  2. Continue po amiodarone 400 mg BID until 5/10 for full load then 200mg qday  3. Continue lasix IV for now- depending on cxr may need to increase or consider thoracentesis  4. Wean O2 as able  5. RN to provide additional chf education/ Will order nutrition consult to discuss low sodium diet  6. Cardiac rehab  7. Start IV antibiotic(Ancef) for right forearm erythema/swelling and leukocytosis c/w cellulitis or thrombophlebitis with additional local treatment    I have reviewed patient data, examined patient, and agree with medical plan.    Roque Garcia MD [facility]      Zainab Cummins, PA-C       Interval History   States he feels fine.  Continues to state he doesn't think he needs O2  Lives alone no roommate, significant other or children.      Physical Exam   Temp: (P) 97  F (36.1  C) Temp src: (P) Oral BP: (P) 114/67 Pulse: 76 Heart Rate: (P) 77 Resp: (P) 18 SpO2: 91 % O2 Device: Nasal cannula Oxygen Delivery: 2 LPM  Vitals:    05/01/17 0445 05/02/17 0500 05/03/17 0533   Weight: 67 kg (147 lb 11.3 oz) 64.8 kg (142 lb 13.7 oz) 64.6 kg (142 lb 6.7 oz)     Constitutional   alert and oriented, in no acute distress.  Skin   warm and dry to touch  Neck  3 cm JVP above sternal notch at 90d   Lungs diminished with rales in RLL  Cardiac regular with occasional ectopic beats, II/VI diastolic murmur  Extremities and Back  Right forearm erythema with swelling and tenderness at site of previous IV site; trace-1+ lower extremity edema    Neurological   Normal speech    Medications     Percutaneous Coronary Intervention orders placed (this is information for BPA alerting)         ceFAZolin  1 g Intravenous Q8H     amiodarone  400 mg Oral BID     furosemide  40 mg Intravenous BID     metoprolol  25 mg Oral BID     lisinopril  2.5 mg Oral BID     insulin aspart  1-3 Units Subcutaneous TID AC     insulin aspart  1-3 Units Subcutaneous At Bedtime     sodium  chloride (PF)  3 mL Intracatheter Q8H     aspirin EC  81 mg Oral Daily     ticagrelor  90 mg Oral Q12H     atorvastatin  40 mg Oral Daily       Data   Most Recent 3 CBC's:  Recent Labs   Lab Test  17   0637  17   0545  17   0535   WBC  14.7*  13.2*  13.0*   HGB  13.6  13.3  13.8   MCV  89  89  89   PLT  453*  433  383     Most Recent 3 BMP's:  Recent Labs   Lab Test  17   0637  17   0545  17   0535   NA  141  140  139   POTASSIUM  3.8  3.3*  3.6   CHLORIDE  103  102  101   CO2  31  29  29   BUN  36*  37*  35*   CR  1.15  1.19  1.17   ANIONGAP  7  9  9   ROBLES  8.3*  8.4*  8.3*   GLC  145*  130*  130*     Most Recent 3 Troponin's:  Recent Labs   Lab Test  17   0525  17   2207  17   1355   17   0347   TROPI  125.570*  >200.000  The 99th percentile for upper reference range is 0.045 ug/L.  Troponin values in   the range of 0.045 - 0.120 ug/L may be associated with risks of adverse   clinical events.   Previous critical documented  *  >200.000  The 99th percentile for upper reference range is 0.045 ug/L.  Troponin values in   the range of 0.045 - 0.120 ug/L may be associated with risks of adverse   clinical events.   Previous critical documented  *   < >   --    TROPONIN   --    --    --    --   14.44*    < > = values in this interval not displayed.     Most Recent 3 BNP's:No lab results found.  Last 24 hours labs reviewed   EKG: (reviewed personally) SR, Q waves in V1-V4, QTc improved to 450ms.    Imagin17  Bilateral pleural effusions are again noted. These have not changed significantly. Diffuse interstitial and alveolar infiltrates are seen. These are greater on the right than the left. These have improved slightly in the left lung since the previous film.    Tele: SR    Echo: 17  The left ventricle is normal in size.  There is mild to moderate concentric left ventricular hypertrophy.  The visual ejection fraction is estimated at 30-35%.  There is  a large anteroapical segment of severe hypokinesis to akinesis, also  involving the distal inferolateral and lateral walls. Consistent with MI in  LAD territory.  Grade II or moderate diastolic dysfunction.  E by E prime ratio is greater than 15, that likely suggests increased left  ventricular filling pressures.  There is no comparison study available. The study was technically difficult.    Last ischemic eval: 4/26/17  ASSESSMENT:  1. Anterior STEMI with 100% thorm,botic occlusion of the prox LAD   2. Two vessel coronary artery disease (LAD and prox RCA)  3. Successful Percutaneous coronary intervention of the prox and mid LAD using overlapping 2.13i84ot and 3.0x24mm Promus Premier MARILYN, post dilated using a 3.25mm NC balloon. This was done after thrombus aspiration  4. Left ventricle with LVEDP of 21 mmHg, no evidence of aortic stenosis.  5. Sheath was secured in place.     PLAN:  1. Aspirin 81 mg po daily lifelong.  2. Ticagrelor 90 mg po bid for at least 1 year uninterrupted.  3. Bedrest per protocol.  4. Admit   5. Continued medical management and lifestyle modification for cardiovascular risk factor optimization.   6. Planned staged PCI of prox RCA    Device: none, possible candidate for ICD in the future

## 2017-05-03 NOTE — CONSULTS
"BRIEF NUTRITION ASSESSMENT      REASON FOR ASSESSMENT:  MD consult - \"New CHF, low sodium diet instructions\"  [facility]    NUTRITION HISTORY:  Pt was seen (4/26) for [facility] diet education - see Nutrition Note for details    Visited with pt again this morning  Breakfast - fruit and an egg, sometimes has a pancake  Lunch - bologna or goes to [employer]  Dinner - ham, veggies, may be a vegetable canned soup  Admits to liking cookies      CURRENT DIET AND INTAKE:  Diet:  2400 mg Na, LSF, Moderate CHO, 1200 mL fluid restriction              Pt has a good appetite  Has been eating 100% of his meals    Note that pt will go to TCU at [facility]    ANTHROPOMETRICS:  Height: 5'5\"  Weight: (5/3) 64.6 kg  BMI: 23.7 kg/m2  IBW:  61.8 kg  Weight Status: Normal BMI  %IBW: 104%  Weight History:   Wt Readings from Last 10 Encounters:   05/03/17 64.6 kg (142 lb 6.7 oz)   04/26/17 65.8 kg (145 lb)   01/13/10 69.4 kg (153 lb)   09/23/09 70.3 kg (155 lb)   08/19/09 71.2 kg (156 lb 14.4 oz)   07/23/09 72.1 kg (159 lb)   07/15/09 72.6 kg (160 lb)         LABS:  Labs noted    MALNUTRITION:  Patient does not meet two of the following criteria necessary for diagnosing malnutrition: significant weight loss, reduced intake, subcutaneous fat loss, muscle loss or fluid retention    NUTRITION INTERVENTION:  Nutrition Diagnosis:  No nutrition diagnosis at this time.    Implementation:  Nutrition Education ---> Reviewed 2gm Na diet.  Provided handouts on Tips for Low Na and Low Na Foods/Drinks.  Discussed lower Na food choices.  Name and ph# given.  Reviewed current diet order (pt with questions about fld restr)      FOLLOW UP/MONITORING:   Will re-evaluate in 7 - 10 days, or sooner, if re-consulted.      **  Recommend Dietitian at [facility] touch base with pt prior to him going home to briefly review recs for low Na diet at home.  He is a bit overwhelmed with all of the info he has been receiving.  **    "

## 2017-05-03 NOTE — PLAN OF CARE
Problem: Goal Outcome Summary  Goal: Goal Outcome Summary  OT/CR: Sit <> stand from EOB and chair with SBA. Pt ambulated approximately 100 feet in hallway with SBA and stable CV response. Attempted NuStep and pt unable to complete longer than 2:00 due to 's-180's. RN notified. Unable to attempt weaning of O2 due to this. /81, HR 75, and O2 92% on room air at start of session. /60 and 's-180's with activity.  Recommend TCU followed by OP CR.

## 2017-05-03 NOTE — PLAN OF CARE
Problem: Goal Outcome Summary  Goal: Goal Outcome Summary  Outcome: No Change  A&Ox4, VSS; BPs looking better with systole in the 120s, denies pain, stable on 2L NC, SBA for urinal/BRPs, tele NSR with HR 70s. Excoriated IV sites looking better; less edema and erythema with minor discomfort, new IV site to R) FA flushing well without difficulty. Continue cardiac rehab.

## 2017-05-04 NOTE — PLAN OF CARE
Problem: Goal Outcome Summary  Goal: Goal Outcome Summary  Outcome: No Change   On po Amio, IV ABX for IV infiltrate right arm. Plan to change to po diuretic. TCU @ D/C

## 2017-05-04 NOTE — PROGRESS NOTES
RADIOLOGY PROCEDURE NOTE  Patient name: Matias Morel  MRN: [id number]  : 1935    Pre-procedure diagnosis: Left pleural effusion  Post-procedure diagnosis: Same    Procedure Date/Time: May 4, 2017  2:41 PM  Procedure: Left thoracentesis  Estimated blood loss: None  Specimen(s) collected with description: none  The patient tolerated the procedure well with no immediate complications.  Significant findings:none    See imaging dictation for procedural details.    Provider name: Rodrigue Sosa  Assistant(s):None

## 2017-05-04 NOTE — PROGRESS NOTES
[facility]  Cardiology Progress Note  Date of Service: 05/04/2017  Primary Cardiologist: Danna    Assessment & Plan    Matias Morel is a 81 year old male with past medical history significant for HTN, DM II, and dyslipidemia admitted on 4/26/2017 with chest pain and found to have an anterior STEMI.    Assessment:  1.  STEMI, anterior, s/p MARILYN to 2 to the proximal to mid LAD   - residual prox RCA at 70-80%, circ and LM with mild disease   - on brillanta and asa   - large MI with trop peaking >200 and EF 30-35%   - remains on O2   - needs staged pci- Likely in 3-4 weeks depending upon clinical stability, unless develops anginal symptoms.     2.  Ischemic cardiomyopathy with acute systolic heart failure-slow improvement with diuresis-will try to switch IV lasix to PO over next 24-48 hours.   - EF 30-35%   - cxr 4/29 showed bilateral effusions and diffuse edema-persistent effusions on CXR (5/3)    - lisinopril, Toprol XL initiated, will consider spironolactone as outpt    3.  Paroxysmal atrial fibrillation, new dx   - CHADS VASC 6 for (vasc dz, dm II, age, CHF, HTN)   - on amiodarone, seen by EP, given on DAPT and brief paroxysm, recommended not adding a third anticoagulant at present   - rhythm control approach- as of 5/2 has received ~4g load, will change to PO amio at 400 mg BID for likely another 7 days, then 200mg BID for 7 days, then 200mg qday   - QTc 497 was borderline prolonged, but improved to ~450ms on EKG 5/2    4.  DMII, has been diet controlled    5.  Hyperlipidemia, lipitor 40mg initiated    6.  Acute renal insufficieny- normal cr on admit, peak at 1.35, now stable ~1.1-1.2    7.  Leukocytosis, stress induced?, negative UA, no sign of pna on cxr     8.  Superficial clot in the left cephalic vein, continue warm packs; also right forearm erythema/swelling at Iv site-?cellulitis or thrombophlebitis related to IV amiodarone infiltration.   - started on IV ancef     Plan:   1. Switch to  po torsemide 30 mg po bid starting this afternoon, last dose of IV this am  2. Increase toprol xl to 37.5 mg bid  3. Continue po amiodarone 400 mg BID until 5/10 for full load then 200mg qday  4. Thoracentesis today, discussed with pt and Dr. Nielsen  5. Wean O2 as able  6. RN to provide additional chf education/ Will order nutrition consult to discuss low sodium diet  7. Cardiac rehab  8. Need to discuss therapy goals and possible disposition    I have reviewed patient data, examine patient, and agree with medical plan.    Roque Garcia MD [facility]    Zainab Cummins PA-C 5/4/2017 9:33 AM      Interval History   Scratchy throat and slight cough.  Feels weak and tired but continues to deny sob.  No additional cp.  Had little exercise tolerance with rehab.  Agreeable to thoracentesis.  Lives alone no roommate, significant other or children.      Physical Exam   Temp: 98.1  F (36.7  C) Temp src: Oral BP: 103/62 Pulse: 76 Heart Rate: 72 Resp: 18 SpO2: 91 % O2 Device: Nasal cannula Oxygen Delivery: 2 LPM  Vitals:    05/02/17 0500 05/03/17 0533 05/04/17 0500   Weight: 64.8 kg (142 lb 13.7 oz) 64.6 kg (142 lb 6.7 oz) 64 kg (141 lb)     Constitutional   alert and oriented, in no acute distress.  Skin   warm and dry to touch  Neck  3 cm JVP above sternal notch at 90d   Lungs diminished to absent LLL, diminished RLL, no wheezes or rales  Cardiac regular with occasional ectopic beats, II/VI diastolic murmur  Extremities and Back  Right forearm erythema with swelling and tenderness at site of previous IV site; trace-1+ lower extremity edema    Neurological   Normal speech    Medications     Percutaneous Coronary Intervention orders placed (this is information for BPA alerting)         ceFAZolin  1 g Intravenous Q8H     amiodarone  400 mg Oral BID     furosemide  40 mg Intravenous BID     metoprolol  25 mg Oral BID     lisinopril  2.5 mg Oral BID     insulin aspart  1-3 Units Subcutaneous TID AC     insulin aspart  1-3 Units  Subcutaneous At Bedtime     sodium chloride (PF)  3 mL Intracatheter Q8H     aspirin EC  81 mg Oral Daily     ticagrelor  90 mg Oral Q12H     atorvastatin  40 mg Oral Daily       Data   Most Recent 3 CBC's:  Recent Labs   Lab Test  17   0637  17   0545  17   0535   WBC  14.7*  13.2*  13.0*   HGB  13.6  13.3  13.8   MCV  89  89  89   PLT  453*  433  383     Most Recent 3 BMP's:  Recent Labs   Lab Test  17   0521  17   0637  17   0545   NA  142  141  140   POTASSIUM  3.9  3.8  3.3*   CHLORIDE  104  103  102   CO2  30  31  29   BUN  42*  36*  37*   CR  1.21  1.15  1.19   ANIONGAP  8  7  9   ROBLES  8.3*  8.3*  8.4*   GLC  140*  145*  130*     Most Recent 3 Troponin's:  Recent Labs   Lab Test  17   0525  17   2207  17   1355   17   0347   TROPI  125.570*  >200.000  The 99th percentile for upper reference range is 0.045 ug/L.  Troponin values in   the range of 0.045 - 0.120 ug/L may be associated with risks of adverse   clinical events.   Previous critical documented  *  >200.000  The 99th percentile for upper reference range is 0.045 ug/L.  Troponin values in   the range of 0.045 - 0.120 ug/L may be associated with risks of adverse   clinical events.   Previous critical documented  *   < >   --    TROPONIN   --    --    --    --   14.44*    < > = values in this interval not displayed.     Most Recent 3 BNP's:No lab results found.  Last 24 hours labs reviewed   EKG: (reviewed personally) SR, Q waves in V1-V4, QTc improved to 450ms.    Imagin17  Bilateral pleural effusions are again noted. These have not changed significantly. Diffuse interstitial and alveolar infiltrates are seen. These are greater on the right than the left. These have improved slightly in the left lung since the previous film.    Tele: SR    Echo: 17  The left ventricle is normal in size.  There is mild to moderate concentric left ventricular hypertrophy.  The visual ejection  fraction is estimated at 30-35%.  There is a large anteroapical segment of severe hypokinesis to akinesis, also  involving the distal inferolateral and lateral walls. Consistent with MI in  LAD territory.  Grade II or moderate diastolic dysfunction.  E by E prime ratio is greater than 15, that likely suggests increased left  ventricular filling pressures.  There is no comparison study available. The study was technically difficult.    Last ischemic eval: 4/26/17  ASSESSMENT:  1. Anterior STEMI with 100% thorm,botic occlusion of the prox LAD   2. Two vessel coronary artery disease (LAD and prox RCA)  3. Successful Percutaneous coronary intervention of the prox and mid LAD using overlapping 2.47w67fc and 3.0x24mm Promus Premier MARILYN, post dilated using a 3.25mm NC balloon. This was done after thrombus aspiration  4. Left ventricle with LVEDP of 21 mmHg, no evidence of aortic stenosis.  5. Sheath was secured in place.     PLAN:  1. Aspirin 81 mg po daily lifelong.  2. Ticagrelor 90 mg po bid for at least 1 year uninterrupted.  3. Bedrest per protocol.  4. Admit   5. Continued medical management and lifestyle modification for cardiovascular risk factor optimization.   6. Planned staged PCI of prox RCA    Device: none, possible candidate for ICD in the future

## 2017-05-04 NOTE — PLAN OF CARE
Problem: Goal Outcome Summary  Goal: Goal Outcome Summary  Outcome: No Change  A&O, VSS, tele SR, on 2L NC, abx administered for cellulitis on RFA; sensitive with flushes. Diuresing with lasix, plan for outpatient cardiac rehab upon D/C.

## 2017-05-04 NOTE — PROGRESS NOTES
[facility]    Hospitalist Progress Note    Date of Service (when I saw the patient): 05/04/2017    Assessment & Plan   Mr. Morel is a markedly pleasant 81 year old gentleman with Type 2 Diabetes mellitus, hypertension, dyslipidemia, and BPH status post TURP who was admitted 4/26/2017 for acute anterior STEMI. Hospitalists are consulted for co-management of Diabetes and cellulitis.    1) Acute anterior STEMI causing acute systolic ischemic CHF exacerbation: Mr. Morel presented to the [facility] ED for evaluation of chest pain, was found to have anterior STEMI, and transferred here for emergent catheterization. He was found to have severe LAD disease as the culprit lesion and underwent successful PCI with placement of two overlapping drug eluting stents there. He was also found to have occluded RCA. Troponin markedly high. TTE showed EF 30-35% with multiple severe wall motion abnormalities. CXR showed bilateral pleural effusions. LDL was 138, HDL 40. IV diuresis, aspirin, Brilinta, Metoprolol, and Lisinopril started.    -- I have discussed his case today with Dr. Garcia of Cardiology as well as Andreina ADAMS.    -- Plan for further diuresis today as guided by Cardiology, likely to switch to PO form    -- Plan for thoracentesis of the effusion to try to better optimize cardio-respiratory status    -- Plan for outpatient consideration of staged intervention, or possible ICD placement if repeat TTE shows persistently diminished LVEF.    -- Plan likely for TCU discharge perhaps Friday or over the weekend    2) Suspected right upper extremity cellulitis: Seen surrounding IV infusion site on 5/3. He hs had leukocytosis but no other signs of SIRS at present.    -- Ancef started, plan to continue today, plan for 10-14 days total antibiotic therapy, transitioning soon to oral cephalosporin if his rash responds to therapy    3) New onset atrial fibrillation with RVR: [id number] score is 6. EP consulted and  Amiodarone started.    -- Amiodarone continued as per Cardiology. Plan for now for DAPT for stroke prevention.    4) Type 2 Diabetes mellitus: Diet controlled as an outpatient. A1c 6.3.     -- ISS insulin while hospitalized    5) FRANCY: Improved initially, a bit higher today with the diuresis; monitor daily    8) Superficial clot in the left cephalic vein: Resolving. We continue compresses    DVT Prophylaxis: Pneumatic Compression Devices  Code Status: Full Code    Disposition: Expected discharge as per Cardiology, perhaps Friday    Jorden Nielsen MD    Interval History   No new complaints today. Remains weak and hypoxic. Rash on arm is about the same.    -Data reviewed today: I reviewed all new labs and imaging results over the last 24 hours. I personally reviewed the chest x-ray image(s) showing bilateral pleural effusions.    Physical Exam   Temp: 98.1  F (36.7  C) Temp src: Oral BP: 103/62 Pulse: 76 Heart Rate: 72 Resp: 18 SpO2: 91 % O2 Device: Nasal cannula Oxygen Delivery: 2 LPM  Vitals:    05/02/17 0500 05/03/17 0533 05/04/17 0500   Weight: 64.8 kg (142 lb 13.7 oz) 64.6 kg (142 lb 6.7 oz) 64 kg (141 lb)     Vital Signs with Ranges  Temp:  [97  F (36.1  C)-98.6  F (37  C)] 98.1  F (36.7  C)  Pulse:  [76] 76  Heart Rate:  [72-80] 72  Resp:  [18] 18  BP: (103-125)/(54-70) 103/62  SpO2:  [91 %-99 %] 91 %  I/O last 3 completed shifts:  In: 860 [P.O.:660; I.V.:200]  Out: 1675 [Urine:1675]    Constitutional: Alert and oriented to person, place and time; no apparent distress  HEENT: normocephalic moist mucus membranes  Respiratory: lungs with crackles at the bases to auscultation bilaterally  Cardiovascular: regular S1 S2 no murmurs rubs or gallops  GI: abdomen soft non tender non distended bowel sounds positive  Skin: RUE with persistent streaky erythema over the anterior forearm; LUE with a small hematoma; good turgor  Musculoskeletal: no clubbing, cyanosis or edema  Neuro: EOMI; moves all four  extremities  Psych: Appropriate affect, insight and judgment      Medications     Percutaneous Coronary Intervention orders placed (this is information for BPA alerting)         ceFAZolin  1 g Intravenous Q8H     amiodarone  400 mg Oral BID     furosemide  40 mg Intravenous BID     metoprolol  25 mg Oral BID     lisinopril  2.5 mg Oral BID     insulin aspart  1-3 Units Subcutaneous TID AC     insulin aspart  1-3 Units Subcutaneous At Bedtime     sodium chloride (PF)  3 mL Intracatheter Q8H     aspirin EC  81 mg Oral Daily     ticagrelor  90 mg Oral Q12H     atorvastatin  40 mg Oral Daily       Data     Recent Labs  Lab 05/04/17  0521 05/03/17  0637 05/02/17  0545 05/01/17  0535   WBC  --  14.7* 13.2* 13.0*   HGB  --  13.6 13.3 13.8   MCV  --  89 89 89   PLT  --  453* 433 383    141 140 139   POTASSIUM 3.9 3.8 3.3* 3.6   CHLORIDE 104 103 102 101   CO2 30 31 29 29   BUN 42* 36* 37* 35*   CR 1.21 1.15 1.19 1.17   ANIONGAP 8 7 9 9   ROBLES 8.3* 8.3* 8.4* 8.3*   * 145* 130* 130*       Recent Results (from the past 24 hour(s))   XR Chest 2 Views    Narrative    XR CHEST 2 VW   5/3/2017 12:34 PM     HISTORY: ongoing hypoxia, diminished to absent bases    COMPARISON: 4/29/2017.      Impression    IMPRESSION: Again identified are small bilateral pleural effusions.  Mixed interstitial and alveolar infiltrate in the right mid to upper  lung is not significantly changed. Mild interstitial infiltrate in the  left upper lung is unchanged.    DALI SUMMERS MD

## 2017-05-04 NOTE — PROGRESS NOTES
SW:  D:  Updated [facility] as to patient's status.  They can accept patient tomorrow.  P:  Will continue to follow.

## 2017-05-04 NOTE — PLAN OF CARE
Problem: Goal Outcome Summary  Goal: Goal Outcome Summary  Outcome: No Change  Pt has been pain free with V/signs stable and maintaining his O2 sats at 94 % with O2 @ 2 L per N/C with humidity.  O2 sats did drop to 88 % on Rm air and O2 was replaced at 2L. Pt did have a bloody nose ( Pt is on Brilninta post Stent 4/26/17 plus 81 mg of ASA). After pressure was held and vasealine was applied to his dry nose there has been no further bleeding. Pt left [facility] around 14:00 for thoracentesis. Continues to be in Sinus Rhythm.

## 2017-05-05 PROBLEM — I50.9 CHF (CONGESTIVE HEART FAILURE) (H): Status: ACTIVE | Noted: 2017-01-01

## 2017-05-05 NOTE — PLAN OF CARE
Problem: Goal Outcome Summary  Goal: Goal Outcome Summary  Outcome: No Change  A&O; forgetful. VSS on 3L. Denies any pain/SOB. Standby assist. Tele SR. No peripheral IV. R and L thoracentesis sites c/d/i. Pt had a nose bleed during the night, dried blood noticed in am. None during this shift. Plan for d/c to TCU later this weekend or Monday. Will continue to monitor.

## 2017-05-05 NOTE — PROGRESS NOTES
[facility]  Cardiology Progress Note  Date of Service: 05/05/2017  Primary Cardiologist: Danna    Assessment & Plan    Matias GURINDER Morel is a 81 year old male with past medical history significant for HTN, DM II, and dyslipidemia admitted on 4/26/2017 with chest pain and found to have an anterior STEMI.    Assessment:  1.  STEMI, anterior, s/p MARILYN to 2 to the proximal to mid LAD   - residual prox RCA at 70-80%, circ and LM with mild disease   - on brillanta and asa   - large MI with trop peaking >200 and EF 30-35%   - remains on O2   - needs staged ptci for more complete revascularization- Likely in 3-4 weeks depending upon clinical stability, unless develops anginal symptoms.     2.  Ischemic cardiomyopathy with acute systolic heart failure-slow improvement with diuresis-will try to switch IV lasix to PO over next 24-48 hours.   - EF 30-35%   - s/p thoracentesis on the L for ~500 cc 5/4 and on the right 475 cc 5/5    - lisinopril, Toprol XL initiated, will consider spironolactone as outpt    3.  Paroxysmal atrial fibrillation, new dx-stable at present on amiodarone   - CHADS VASC 6 for (vasc dz, dm II, age, CHF, HTN)   - on amiodarone, seen by EP, given on DAPT and brief paroxysm, recommended not adding a third anticoagulant at present   - rhythm control approach- as of 5/2 has received ~4g load, will change to PO amio at 400 mg BID for likely another 7 days, then 200mg BID for 7 days, then 200mg qday   - QTc 497 was borderline prolonged, but improved to ~450ms on EKG 5/2    4.  DMII, has been diet controlled    5.  Hyperlipidemia, lipitor 40mg initiated    6.  Acute renal insufficieny- normal cr on admit, increased today suggesting intravascular depletion together with thoracentesis    7.  Leukocytosis, stress induced?, negative UA, no sign of pna on cxr     8.  Superficial clot in the left cephalic vein, continue warm packs; also right forearm erythema/swelling at Iv site-?cellulitis or  thrombophlebitis related to IV amiodarone infiltration.   - started on IV ancef    9.  Hypoxemia-persistent despite aggressive diuresis-guarded prognosis with multiple medical issues and advanced age.     Plan:   1. Decrease torsemide to 20 mg bid will hold this afternoon dose  2. Decrease lisinopril to 2.5 mg daily   3. Continue po amiodarone 400 mg BID until 5/10 for full load then 200mg qday  4. Thoracentesis on rt today-done without apparent complication, discussed with pt and Dr. Nielsen  5. Will need tcu on d/c, expect long road to recovery with extensive rehabilitation  6. Will arrange for core clinic f/u    I have reviewed patient data, examined patient, and agree with medical plan.    Roque Garcia MD [facility]      Zainab Cummins PA-C 5/5/2017 9:40 AM    Interval History   Coughing less, denies sob.  No cp.  Feels he can take a deeper breath.  Denies dizziness, syncope or near syncope.  He wants to see how the next few weeks go before he decides on further stents.         Lives alone no roommate, significant other or children.      Physical Exam   Temp: 97.4  F (36.3  C) Temp src: Oral BP: (!) 86/56   Heart Rate: 71 Resp: 18 SpO2: 93 % O2 Device: Nasal cannula Oxygen Delivery: 2 LPM  Vitals:    05/03/17 0533 05/04/17 0500 05/05/17 0500   Weight: 64.6 kg (142 lb 6.7 oz) 64 kg (141 lb) 64.8 kg (142 lb 13.7 oz)     Constitutional   alert and oriented, in no acute distress, frail appearing  Skin   warm and dry to touch  Neck  Neck veins are flat at 90d  Lungs rhonchi in LLL- improved from yesterday,   Cardiac regula, II/VI diastolic murmur  Extremities and Back  Right forearm erythema with swelling and tenderness at site of previous IV site; trace-1+ lower extremity edema    Neurological   Normal speech    Medications     Percutaneous Coronary Intervention orders placed (this is information for BPA alerting)         torsemide  20 mg Oral BID     metoprolol  37.5 mg Oral BID     cephalexin  500 mg Oral Q12H      amiodarone  400 mg Oral BID     lisinopril  2.5 mg Oral BID     insulin aspart  1-3 Units Subcutaneous TID AC     insulin aspart  1-3 Units Subcutaneous At Bedtime     aspirin EC  81 mg Oral Daily     ticagrelor  90 mg Oral Q12H     atorvastatin  40 mg Oral Daily       Data   Most Recent 3 CBC's:  Recent Labs   Lab Test  17   0637  17   0545  17   0535   WBC  14.7*  13.2*  13.0*   HGB  13.6  13.3  13.8   MCV  89  89  89   PLT  453*  433  383     Most Recent 3 BMP's:  Recent Labs   Lab Test  17   0535  17   0521  17   0637   NA  141  142  141   POTASSIUM  3.6  3.9  3.8   CHLORIDE  103  104  103   CO2  30  30  31   BUN  44*  42*  36*   CR  1.44*  1.21  1.15   ANIONGAP  8  8  7   ROBLES  8.1*  8.3*  8.3*   GLC  141*  140*  145*     Most Recent 3 Troponin's:  Recent Labs   Lab Test  17   0525  17   2207  17   1355   17   0347   TROPI  125.570*  >200.000  The 99th percentile for upper reference range is 0.045 ug/L.  Troponin values in   the range of 0.045 - 0.120 ug/L may be associated with risks of adverse   clinical events.   Previous critical documented  *  >200.000  The 99th percentile for upper reference range is 0.045 ug/L.  Troponin values in   the range of 0.045 - 0.120 ug/L may be associated with risks of adverse   clinical events.   Previous critical documented  *   < >   --    TROPONIN   --    --    --    --   14.44*    < > = values in this interval not displayed.     Most Recent 3 BNP's:No lab results found.  Last 24 hours labs reviewed   EKG: (reviewed personally) SR, Q waves in V1-V4, QTc improved to 450ms.    Imagin17  Bilateral pleural effusions are again noted. These have not changed significantly. Diffuse interstitial and alveolar infiltrates are seen. These are greater on the right than the left. These have improved slightly in the left lung since the previous film.    Tele: SR    Echo: 17  The left ventricle is normal in  size.  There is mild to moderate concentric left ventricular hypertrophy.  The visual ejection fraction is estimated at 30-35%.  There is a large anteroapical segment of severe hypokinesis to akinesis, also  involving the distal inferolateral and lateral walls. Consistent with MI in  LAD territory.  Grade II or moderate diastolic dysfunction.  E by E prime ratio is greater than 15, that likely suggests increased left  ventricular filling pressures.  There is no comparison study available. The study was technically difficult.    Last ischemic eval: 4/26/17  ASSESSMENT:  1. Anterior STEMI with 100% thorm,botic occlusion of the prox LAD   2. Two vessel coronary artery disease (LAD and prox RCA)  3. Successful Percutaneous coronary intervention of the prox and mid LAD using overlapping [id number] and 3.0x24mm Promus Premier MARILYN, post dilated using a 3.25mm NC balloon. This was done after thrombus aspiration  4. Left ventricle with LVEDP of 21 mmHg, no evidence of aortic stenosis.  5. Sheath was secured in place.     PLAN:  1. Aspirin 81 mg po daily lifelong.  2. Ticagrelor 90 mg po bid for at least 1 year uninterrupted.  3. Bedrest per protocol.  4. Admit   5. Continued medical management and lifestyle modification for cardiovascular risk factor optimization.   6. Planned staged PCI of prox RCA    Device: none, possible candidate for ICD in the future

## 2017-05-05 NOTE — PROGRESS NOTES
[facility]    Hospitalist Progress Note    Date of Service (when I saw the patient): 05/05/2017    Assessment & Plan   Mr. Morel is a markedly pleasant 81 year old gentleman with Type 2 Diabetes mellitus, hypertension, dyslipidemia, and BPH status post TURP who was admitted 4/26/2017 for acute anterior STEMI.     1) Acute anterior STEMI causing acute systolic ischemic CHF exacerbation: Mr. Morel presented to the [facility] ED for evaluation of chest pain, was found to have anterior STEMI, and transferred here for emergent catheterization. He was found to have severe LAD disease as the culprit lesion and underwent successful PCI with placement of two overlapping drug eluting stents there. He was also found to have occluded RCA. Troponin markedly high. TTE showed EF 30-35% with multiple severe wall motion abnormalities. CXR showed bilateral pleural effusions. LDL was 138, HDL 40. IV diuresis, aspirin, Brilinta, Metoprolol, and Lisinopril were started.    -- I have discussed his case once again today with Dr. Garcia of Cardiology as well as Andreina ADAMS.    -- He was switched to oral Torsemide yesterday; this morning renal function slightly worse and BP low. Will hold PM dose of Torsemide and reassess in AM    -- He underwent thoracentesis of the left effusion yesterday. Initial studies seem to be negative other than high LDH. Plan for thoracentesis of the right sided effusion today to try to better optimize cardio-respiratory status as he remains hypoxic and debilitated.    -- Plan for outpatient consideration of staged intervention, or possible ICD placement if repeat TTE shows persistently diminished LVEF.    -- Plan likely for TCU discharge perhaps over the weekend or Monday    -- Hospitalists asked to take over care as the primary team and I have agreed to do so    2) Suspected right upper extremity cellulitis: Seen surrounding IV infusion site on 5/3. He hs had leukocytosis but no other signs  of SIRS at present.    -- Ancef started, now improved rash; plan for 10-14 days total antibiotic therapy, transitioning today to Keflex with plan to monitor further    3) New onset atrial fibrillation with RVR: [id number] score is 6. EP consulted and Amiodarone started.    -- Amiodarone continued as per Cardiology. Plan for now for DAPT for stroke prevention.    4) Type 2 Diabetes mellitus: Diet controlled as an outpatient. A1c 6.3.     -- ISS insulin while hospitalized    5) FRANCY: Improved initially, a bit higher today with the diuresis; monitor daily    8) Superficial clot in the left cephalic vein: Resolving. We continue compresses    DVT Prophylaxis: Pneumatic Compression Devices  Code Status: Full Code    Disposition: Expected discharge several days.    Jorden Nielsen MD    Interval History   Rash better. Denies dyspnea or chest pain. We talked at length about his prognosis and he is looking forward to PT and reassessment by Cardiology as an outpatient.    -Data reviewed today: I reviewed all new labs and imaging results over the last 24 hours. I personally reviewed the chest x-ray image(s) showing improved bilateral pleural effusions.    Physical Exam   Temp: 97.4  F (36.3  C) Temp src: Oral BP: (!) 86/56   Heart Rate: 71 Resp: 18 SpO2: 93 % O2 Device: Nasal cannula Oxygen Delivery: 2 LPM  Vitals:    05/03/17 0533 05/04/17 0500 05/05/17 0500   Weight: 64.6 kg (142 lb 6.7 oz) 64 kg (141 lb) 64.8 kg (142 lb 13.7 oz)     Vital Signs with Ranges  Temp:  [97.4  F (36.3  C)-98.2  F (36.8  C)] 97.4  F (36.3  C)  Heart Rate:  [64-78] 71  Resp:  [18] 18  BP: ()/(48-64) 86/56  SpO2:  [88 %-96 %] 93 %  I/O last 3 completed shifts:  In: 1070 [P.O.:1070]  Out: 1675 [Urine:1175; Drains:500]    Constitutional: Alert and oriented to person, place and time; no apparent distress  HEENT: normocephalic moist mucus membranes  Respiratory: lungs with crackles at the bases to auscultation bilaterally but much improved  today, less diminished  Cardiovascular: regular S1 S2 no murmurs rubs or gallops  GI: abdomen soft non tender non distended bowel sounds positive  Skin: RUE with persistent streaky erythema over the anterior forearm; LUE with a small hematoma; good turgor  Musculoskeletal: no clubbing, cyanosis or edema  Neuro: EOMI; moves all four extremities  Psych: Appropriate affect, insight and judgment      Medications     Percutaneous Coronary Intervention orders placed (this is information for BPA alerting)         [START ON 5/6/2017] torsemide  20 mg Oral BID     [START ON 5/6/2017] lisinopril  2.5 mg Oral Daily     metoprolol  37.5 mg Oral BID     cephalexin  500 mg Oral Q12H     amiodarone  400 mg Oral BID     insulin aspart  1-3 Units Subcutaneous TID AC     insulin aspart  1-3 Units Subcutaneous At Bedtime     aspirin EC  81 mg Oral Daily     ticagrelor  90 mg Oral Q12H     atorvastatin  40 mg Oral Daily       Data     Recent Labs  Lab 05/05/17  0535 05/04/17  0521 05/03/17  0637 05/02/17  0545 05/01/17  0535   WBC  --   --  14.7* 13.2* 13.0*   HGB  --   --  13.6 13.3 13.8   MCV  --   --  89 89 89   PLT  --   --  453* 433 383    142 141 140 139   POTASSIUM 3.6 3.9 3.8 3.3* 3.6   CHLORIDE 103 104 103 102 101   CO2 30 30 31 29 29   BUN 44* 42* 36* 37* 35*   CR 1.44* 1.21 1.15 1.19 1.17   ANIONGAP 8 8 7 9 9   ROBLES 8.1* 8.3* 8.3* 8.4* 8.3*   * 140* 145* 130* 130*   PROTTOTAL  --  6.7*  --   --   --        Recent Results (from the past 24 hour(s))   US Thoracentesis    Narrative    EXAM: US THORACENTESIS       5/4/2017 2:57 PM       HISTORY: Left pleural effusion      PROCEDURE:  Written informed consent was obtained from the patient  prior to the procedure. The risks and benefits including bleeding,  infection and pneumothorax were discussed and the patient wished to  continue. Initial ultrasound images demonstrated a left-sided pleural  fluid collection. A permanent image was saved. The skin overlying  this  collection was marked, prepped, draped and anesthetized in usual  sterile fashion utilizing 5 mL of lidocaine 1%. Thoracentesis catheter  was then placed into the pleural fluid collection with return of 475  mL of yellow fluid. Estimated blood loss during the procedure was less  than 5 mL. No specimens collected. Patient tolerated the procedure  well. Followup chest x-ray was ordered.        Impression    IMPRESSION:  Ultrasound-guided left-sided thoracentesis.     CESAR RAMOS PA-C   XR Chest 1 View    Narrative    XR CHEST 1 VW 5/4/2017 3:04 PM    COMPARISON: 5/3/2017    HISTORY: Left thoracentesis.      Impression    IMPRESSION: Small residual bilateral pleural effusions following  thoracentesis. No pneumothorax. Mixed interstitial and airspace  opacities over both lungs, right greater than left, slightly worsened  since yesterday's study.    FANTA RIVERA

## 2017-05-05 NOTE — PROGRESS NOTES
RADIOLOGY PROCEDURE NOTE  Patient name: Matias Morel  MRN: [id number]  : 1935    Pre-procedure diagnosis: Right pleural effusion  Post-procedure diagnosis: Same    Procedure Date/Time: May 5, 2017  2:06 PM  Procedure: Thoracentesis  Estimated blood loss: None  Specimen(s) collected with description: Pleural fluid.  The patient tolerated the procedure well with no immediate complications.    See imaging dictation for procedural details and findings.    Provider name: Timbo Rosa  Assistant(s):None

## 2017-05-05 NOTE — PLAN OF CARE
Problem: Goal Outcome Summary  Goal: Goal Outcome Summary  Outcome: No Change  A&O, forgetful at times. VSS, but BP remains soft, pt asymptomatic. Denies any chest pain/SOB. O2 sats 93% on 2L NC. L [facility] site CDI. Plan is possible D/C to tcu. Will continue to monitor.

## 2017-05-05 NOTE — PROVIDER NOTIFICATION
MD Notification    Notified Person:  MD    Notified Persons Name: Dr. Wilson    Notification Date/Time: 5/4/2017 2130    Notification Interaction:  Talked with Physician    Purpose of Notification: Unable to obtain IV access despite multiple attempts and CRNA. Should pt start PO abx?    Orders Received: PO Kelfex ordered    Comments:

## 2017-05-05 NOTE — PROGRESS NOTES
US Thoracentesis. Pt identified, VSS Denies pain no SOB. Sats difficult to obtain, fingers cool. 93% on 2Lnc at this time. Consent obtained, time out taken. 475 cc red/jaquelin fluid drained from right chest. Pt tolerated well. O2 at 2l nc. Transported back to room by radiology transport. Will call report.

## 2017-05-05 NOTE — PROGRESS NOTES
SW:  D:  Call placed to update [facility] as to patient's status.  They can accept patient on Monday.  P:  Will continue to follow.

## 2017-05-05 NOTE — PROVIDER NOTIFICATION
MD Notification    Notified Person:  MD    Notified Persons Name: Dr. Wilson    Notification Date/Time: 5/4/2017 8:14 PM     Notification Interaction:  Talked with Physician    Purpose of Notification: Pt lost IV access, has superficial clot in L cephalic vein noted on 4/30, OK to use this arm in a different area for IV access?    Orders Received: OK to use L arm for IV access in different area than location of clot    Comments:

## 2017-05-05 NOTE — PROGRESS NOTES
Unable to obtain IV access despite multiple attempts per nursing staff, orders submitted for PO keflex as patient previously receiving IV ancef for cellulitis.

## 2017-05-06 NOTE — PLAN OF CARE
Problem: Goal Outcome Summary  Goal: Goal Outcome Summary  Outcome: No Change  Pt is A&OX4 denies any chest pain and sob.VSS O2 satting at 92-93% with 3L NC.pt had c/o lower left abdomen 6/10.pt says he had that pain yesterday but went away after while.Tylenol given at 2202.pt had right thoracentesis this afternoon.Dressing dry and intact at the back(right side).Will continue to monitor.

## 2017-05-06 NOTE — PLAN OF CARE
Problem: Goal Outcome Summary  Goal: Goal Outcome Summary  Outcome: No Change  A&O; forgetful. SR; run of self limiting A-fib at 0900. Converted back to SR. Standby assist. SOB reported; 96% on 5L. Denies any pain.Plan for d/c Monday to [facility]. Will continue to monitor.

## 2017-05-06 NOTE — PLAN OF CARE
Problem: Goal Outcome Summary  Goal: Goal Outcome Summary  Outcome: No Change  Alert, but forgetful at times. VSS, BP remains on softer side. Tele shows SR HR 65. O2 sats low 90's on 4L. Pt c/o right abdomen pain this morning, says pain comes and goes, did not want medication -  heat pack was given. Pt slept on chair most of the night. Plan is TCU at D/C possibly Monday. Will continue to monitor.

## 2017-05-06 NOTE — PROGRESS NOTES
Cardiology Progress Note  Sathish Zuniga MD      Matias Morel is a 81 year old male with past medical history significant for HTN, DM II, and dyslipidemia   admitted on 4/26/2017 with chest pain and found to have an anterior STEMI.         Assessment and Plan:        1. Large anterior STEMI, s/p MARILYN to proximal and mid LAD  - residual prox RCA at 70-80%, circ and LM with mild disease  - on Brillinta and ASA  - EF 30-35%  - remains on O2  - considering staged PCI for more complete revascularization, possibly in 3-4 weeks depending upon clinical stability.  - on Toprol XL    - hold lisinopril given increase in creat; will resume later    2. Ischemic cardiomyopathy EF 30-35% with acute systolic heart failure, hypoxia, worsening CXR findings:   has diuresed with IV lasix but now creat is increasing.  CXR on 5/5 looks terrible with diffuse bilateral infiltrates that appear worse despite diuresis; has diffuse crackles on exam, persistent hypoxemia, WBC is up as well.  High concern for acute amiodarone pulmonary toxicity.  Alternatively, may be other acute/subacute pulmonary process.  - stop amiodarone; repeat PA/lat CXR in the am.  Would try to avoid steroids, if possible, given recent MI.     - stop diuretic  - s/p thoracentesis on the L for ~500 cc 5/4 and on the right 475 cc 5/5  - consider pulmonary consultation    3. Paroxysmal atrial fibrillation, new dx, see above ---> stop amiodarone  - [id number] 6 for (vasc dz, dm II, age, CHF, HTN)  - given DAPT and brief AF, would not add a third anticoagulant at present     4. DM-II.       5. Hyperlipidemia: Lipitor 40mg       Discussed with Dr. Nielsen.  Complex patient. Time spent > 35 min.                     Interval History:   Feels OK, some cough, not dyspneic at rest          Review of Systems:   C: NEGATIVE for fever, chills, change in weight  E/M: NEGATIVE for ear, mouth and throat problems  R: cough, some SOB w exertion   CV: NEGATIVE for chest pain,  "palpitations or peripheral edema          Physical Exam:        Blood pressure 114/67, pulse 76, temperature 97.6  F (36.4  C), temperature source Oral, resp. rate 20, height 1.651 m (5' 5\"), weight 64.8 kg (142 lb 13.7 oz), SpO2 90 %.  Vitals:    05/03/17 0533 05/04/17 0500 05/05/17 0500   Weight: 64.6 kg (142 lb 6.7 oz) 64 kg (141 lb) 64.8 kg (142 lb 13.7 oz)     Vital Signs with Ranges  Temp:  [97.3  F (36.3  C)-97.7  F (36.5  C)] 97.6  F (36.4  C)  Heart Rate:  [66-82] 66  Resp:  [20-24] 20  BP: ()/(47-78) 114/67  SpO2:  [90 %-94 %] 90 %  I/O's Last 24 hours  I/O last 3 completed shifts:  In: 800 [P.O.:800]  Out: 375 [Urine:375]    EXAM:  GEN: elderly frail-appearing male  JVP normal  LUNGS: diffuse crackles in all lung fields  CV:  RRR, no g/m  ABD: soft, NT  EXTR: no edema         Medications:          lisinopril  2.5 mg Oral Daily     metoprolol  37.5 mg Oral BID     cephalexin  500 mg Oral Q12H     insulin aspart  1-3 Units Subcutaneous TID AC     insulin aspart  1-3 Units Subcutaneous At Bedtime     aspirin EC  81 mg Oral Daily     ticagrelor  90 mg Oral Q12H     atorvastatin  40 mg Oral Daily            Data:      All new lab and imaging data was reviewed.   Recent Labs   Lab Test  05/03/17   0637  05/02/17   0545  05/01/17   0535   04/26/17   0340   WBC  14.7*  13.2*  13.0*   < >  14.1*   HGB  13.6  13.3  13.8   < >  15.6   MCV  89  89  89   < >  92   PLT  453*  433  383   < >  402   INR   --    --    --    --   0.96    < > = values in this interval not displayed.      Recent Labs   Lab Test  05/06/17   0514  05/05/17   0535  05/04/17   0521   NA  141  141  142   POTASSIUM  4.0  3.6  3.9   CHLORIDE  101  103  104   CO2  31  30  30   BUN  52*  44*  42*   CR  1.58*  1.44*  1.21   ANIONGAP  9  8  8   ROBLES  8.7  8.1*  8.3*   GLC  153*  141*  140*     Recent Labs   Lab Test  04/27/17   0525  04/26/17   2207  04/26/17   1355   04/26/17   0347   TROPI  125.570*  >200.000  The 99th percentile for upper " reference range is 0.045 ug/L.  Troponin values in   the range of 0.045 - 0.120 ug/L may be associated with risks of adverse   clinical events.   Previous critical documented  *  >200.000  The 99th percentile for upper reference range is 0.045 ug/L.  Troponin values in   the range of 0.045 - 0.120 ug/L may be associated with risks of adverse   clinical events.   Previous critical documented  *   < >   --    TROPONIN   --    --    --    --   14.44*    < > = values in this interval not displayed.         EKG results:  Reviewed      Imaging:   No results found for this or any previous visit (from the past 24 hour(s)).

## 2017-05-06 NOTE — PROGRESS NOTES
[facility]    Hospitalist Progress Note    Date of Service (when I saw the patient): 05/06/2017    Assessment & Plan   Mr. Morel is a markedly pleasant 81 year old gentleman with Type 2 Diabetes mellitus, hypertension, dyslipidemia, and BPH status post TURP who was admitted 4/26/2017 for acute anterior STEMI. Hospitalists asked to take over care as the primary team and I have agreed to do so    1) Acute anterior STEMI causing acute systolic ischemic CHF exacerbation: Mr. Morel presented to the [facility] ED for evaluation of chest pain, was found to have anterior STEMI, and transferred here for emergent catheterization. He was found to have severe LAD disease as the culprit lesion and underwent successful PCI with placement of two overlapping drug eluting stents there. He was also found to have occluded RCA. Troponin markedly high. TTE showed EF 30-35% with multiple severe wall motion abnormalities. CXR showed bilateral pleural effusions. LDL was 138, HDL 40. IV diuresis, aspirin, Brilinta, Metoprolol, and Lisinopril were started.     -- He was switched to oral Torsemide on 5/4; on 5/5 Creatinine increased and PM dose was held. His blood pressures have been a bit low as well. This AM Creatinine slightly higher again. I held the AM diuretic dose. Cardiology to assess further today, also to determine if his 2.5 mg Lisinopril should also be held going forward    -- He has undergone thoracenteses of his left and right sided effusions with negative fluid studies thus far    -- He remains hypoxic today; we wean oxygen as able    -- Plan for outpatient consideration of staged intervention, or possible ICD placement if repeat TTE shows persistently diminished LVEF.    -- Plan likely for TCU discharge perhaps Monday    2) Suspected right upper extremity cellulitis: Seen surrounding IV infusion site on 5/3. He hs had leukocytosis but no other signs of SIRS at present.    -- Ancef started, now improved rash;  plan for 10-14 days total antibiotic therapy, transitioned to Keflex with plan to monitor further while he is here    3) New onset atrial fibrillation with RVR: [id number] score is 6. EP consulted and Amiodarone started.    -- Amiodarone continued as per Cardiology. Plan for now for DAPT for stroke prevention.    4) Type 2 Diabetes mellitus: Diet controlled as an outpatient. A1c 6.3.     -- ISS insulin while hospitalized    5) FRANCY: Improved initially, now worsened as above; we will hold nephrotoxic agents as able    6) Superficial clot in the left cephalic vein: Resolved    DVT Prophylaxis: Pneumatic Compression Devices  Code Status: Full Code    Disposition: Expected discharge several days.    Jorden Nielsen MD    Interval History   He had a cramping pain under the right chest wall that seemed to resolve after passing gas. He otherwise denies chest pain or dyspnea today. He is looking forward to the [facility].    -Data reviewed today: I reviewed all new labs and imaging results over the last 24 hours. I personally reviewed the chest x-ray image(s) showing unchange small bilateral pleural effusions.    Physical Exam   Temp: 97.6  F (36.4  C) Temp src: Oral BP: 114/67   Heart Rate: 66 Resp: 20 SpO2: 90 % O2 Device: Nasal cannula Oxygen Delivery: 5 LPM  Vitals:    05/03/17 0533 05/04/17 0500 05/05/17 0500   Weight: 64.6 kg (142 lb 6.7 oz) 64 kg (141 lb) 64.8 kg (142 lb 13.7 oz)     Vital Signs with Ranges  Temp:  [97.3  F (36.3  C)-97.7  F (36.5  C)] 97.6  F (36.4  C)  Heart Rate:  [64-82] 66  Resp:  [18-24] 20  BP: ()/(45-78) 114/67  SpO2:  [90 %-94 %] 90 %  I/O last 3 completed shifts:  In: 800 [P.O.:800]  Out: 375 [Urine:375]    Constitutional: Alert and oriented to person, place and time; no apparent distress  HEENT: normocephalic moist mucus membranes  Respiratory: lungs with few crackles at the bases to auscultation bilaterally but improved airflow  Cardiovascular: regular S1 S2 no murmurs rubs  or gallops  GI: abdomen soft non tender non distended bowel sounds positive  Skin: RUE with persistent streaky erythema over the anterior forearm, much improved; good turgor  Musculoskeletal: no clubbing, cyanosis or edema  Neuro: EOMI; moves all four extremities  Psych: Appropriate affect, insight and judgment      Medications     Percutaneous Coronary Intervention orders placed (this is information for BPA alerting)         lisinopril  2.5 mg Oral Daily     metoprolol  37.5 mg Oral BID     cephalexin  500 mg Oral Q12H     amiodarone  400 mg Oral BID     insulin aspart  1-3 Units Subcutaneous TID AC     insulin aspart  1-3 Units Subcutaneous At Bedtime     aspirin EC  81 mg Oral Daily     ticagrelor  90 mg Oral Q12H     atorvastatin  40 mg Oral Daily       Data     Recent Labs  Lab 05/06/17  0514 05/05/17  0535 05/04/17  0521 05/03/17  0637 05/02/17  0545 05/01/17  0535   WBC  --   --   --  14.7* 13.2* 13.0*   HGB  --   --   --  13.6 13.3 13.8   MCV  --   --   --  89 89 89   PLT  --   --   --  453* 433 383    141 142 141 140 139   POTASSIUM 4.0 3.6 3.9 3.8 3.3* 3.6   CHLORIDE 101 103 104 103 102 101   CO2 31 30 30 31 29 29   BUN 52* 44* 42* 36* 37* 35*   CR 1.58* 1.44* 1.21 1.15 1.19 1.17   ANIONGAP 9 8 8 7 9 9   ROBLES 8.7 8.1* 8.3* 8.3* 8.4* 8.3*   * 141* 140* 145* 130* 130*   PROTTOTAL  --  6.6* 6.7*  --   --   --        Recent Results (from the past 24 hour(s))   US Thoracentesis    Narrative    ULTRASOUND GUIDED THORACENTESIS  5/5/2017 2:18 PM     HISTORY: Right-sided pleural effusion.    FINDINGS: Ultrasound was used to evaluate for the presence and best  approach for drainage of a pleural effusion. Written and oral informed  consent was obtained. A pause for the cause procedure to verify the  correct patient and correct procedure. The skin overlying the right  chest posteriorly was prepped and draped in the usual sterile fashion.  The subcutaneous tissues were anesthetized with 5 mL lidocaine 1%.  A  catheter was advanced into the pleural space and 475 mL of  jaquelin  colored fluid was drained. Patient was monitored by nurse under my  direct supervision throughout the exam. Ultrasound images were  permanently stored.  There were no immediate complications. Patient  left the ultrasound suite in satisfactory condition.      Impression    IMPRESSION: Technically successful thoracentesis without immediate  complications.    CHUCK RUIZ MD   XR Chest 1 View    Narrative    XR CHEST 1 VW 5/5/2017 2:21 PM    HISTORY: Thoracentesis.    COMPARISON: May 4, 2017.      Impression    IMPRESSION: Diffuse pulmonary opacities as before, with bilateral  pleural effusions, right greater than left, not significantly changed  from prior examination. No pneumothorax.    KAIA ROMERO MD

## 2017-05-07 NOTE — PLAN OF CARE
Problem: Goal Outcome Summary  Goal: Goal Outcome Summary  Outcome: No Change  Disoriented to time; forgetful. VSS on 2L. Up with standby assist. Tele SR. Denies any pain/SOB/numbness/tingling. BP soft in the 90s. Chest XR and CT done. Started on Rocephin today. Will continue to monitor.

## 2017-05-07 NOTE — PLAN OF CARE
Problem: Goal Outcome Summary  Goal: Goal Outcome Summary  Outcome: No Change  Pt a&ox4(forgetful) VSS .94% with 2L NC.denies any SOB n CP.SBA with steady gait.NSR in monitor.Will continue to monitor

## 2017-05-07 NOTE — PLAN OF CARE
Problem: Goal Outcome Summary  Goal: Goal Outcome Summary  OT/cardiac rehab: pt seen in pm and tolerated amb x approx 375ft pushing wc, CGA. Notable SOB with amb, /95, HR 79, 02 96% on 2L. Pt impulsive, needs cues for path-finding and safety at times. Went into bathroom removing NC to have BM and had significant SOB, unable to get 02 reading at completion however OT replaced NC at 2L once back in bed. Will cont per POC.  [facility].

## 2017-05-07 NOTE — CONSULTS
PULMONARY CONSULTATION      HISTORY OF PRESENT ILLNESS:.  Matias Morel is a very pleasant 81-year-old man with type 2 diabetes, hypertension, dyslipidemia and BPH, status post TURP, who is here for an acute anterior STEMI.  I am asked to see the patient regarding the possibility of amiodarone toxicity for atrial fibrillation.  The patient evidently just recently started amiodarone.  The patient was at [facility] for chest pain and was found to have myocardial infarction and severe LAD disease.  He had successful PCI with placement of 2 overlapping drug-eluting stents.  He was found to have an occluded RCA.  Troponins were markedly high.  Ejection fractions showed an EF suppressed at 30-35% with severe wall motion abnormalities.  Chest x-ray showed bilateral effusions.  The patient had diuresis aspirin, metoprolol and amiodarone started.  The patient had bilateral thoracenteses, which have shown a high glucose and a high pH of over 8.  Pleural fluid on 1 side was slightly greater than 50%.  The lactate dehydrogenases were over 60% of serum value.  The patient had fluid taken off.  The patient is a nonsmoker.  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  He had no asbestos exposure.  He had pneumonia 10 years ago, but he has never had shortness of breath, cough, hemoptysis, pleurisy, chest pain or wheezing.  He was able to mow lawns and climb stairs without trouble.  He had no prior history of sinusitis, asthma or allergies.      OTHER PAST MEDICAL HISTORY:  Pertinent for cellulitis and diabetes.      PAST SURGICAL HISTORY:  Genitourinary surgeries.      SOCIAL HISTORY:  He never smoked.  No alcohol.      FAMILY HISTORY:  Noncontributory.      ALLERGIES:  No known allergies.      MEDICATIONS PRIOR TO ADMISSION:  Lisinopril, simvastatin, aspirin, ibuprofen, calcium, Fosamax, vitamins and Avodart.      REVIEW OF SYSTEMS:  A 10-point review of systems is negative.      PHYSICAL EXAMINATION:   VITAL SIGNS:   The patient is afebrile.  Pulse is 70 and irregular, blood pressure is 105/53 and respiratory rate is 16 and not labored.  SaO2 is 99% on low-flow oxygen.   GENERAL:  The patient is lying flat in bed without respiratory distress. He is able to speak in full sentences.  He is in no acute distress.  He is a well-nourished, very pleasant man.  He is unable to give a good history.   HEENT:  Negative.  No scleral icterus.  EOMs are normal.  Normal pupils.  No head or neck adenopathy.     NECK:  Supple neck.  No thyromegaly.  No JVD.  Carotids are intact.   CHEST:  Clear to A&P.   ABDOMEN:  Negative.   EXTREMITIES:  No cyanosis, clubbing or edema.  Pulses are intact.   SKIN:  Negative.     NEUROLOGIC:  The patient is alert and oriented.  Judgment and mentation are normal.      ASSESSMENT:  This patient on chest x-ray had bilateral interstitial infiltrates with pleural effusions.  The most likely abnormality is congestive heart failure.  I would hold the amiodarone.  Acute amiodarone toxicity is uncommon.  It is usually dose related.  There are rare reports of exudative effusions with amiodarone.  The usual treatment is removing the amiodarone and giving steroids.  There is concern about this.  At this time, I would recommend a CT scan of the chest to better define any underlying abnormalities.  Generally, this occurs in patients using the drug for more than 2 months at a minimum.  Acute pulmonary toxicity is rare.  There is a multitude of abnormalities that can be induced by amiodarone from interstitial pneumonitis to .  Eosinophilic pneumonia is reported as well.  I cannot give you a definite answer as to whether this represents amiodarone toxicity.  It is likely multifactorial given his history of myocardial infarction and clear heart failure with a low EF.  I agree with stopping amiodarone.  I would obtain a baseline CT scan.  We can reassess the risk of steroids during this period.  The patient certainly could have  pneumonitis on top of this, and I agree with an antibiotic course.         JONATHAN STANLEY MD             D: 2017 11:05   T: 2017 12:51   MT: TERESA#[id number]      Name:     ANKIT KASPER   MRN:      [id number]        Account:       [id number]   :      1935           Consult Date:  2017      Document: [id number]

## 2017-05-07 NOTE — PROGRESS NOTES
[facility]    Hospitalist Progress Note    Date of Service (when I saw the patient): 05/07/2017    Assessment & Plan   Mr. Morel is a markedly pleasant 81 year old gentleman with Type 2 Diabetes mellitus, hypertension, dyslipidemia, and BPH status post TURP who was admitted 4/26/2017 for acute anterior STEMI. Hospitalists asked to take over care as the primary team and I have agreed to do so. He now has ongoing hypoxia that could be due to acute amiodarone-induced lung toxicity.    1) Acute anterior STEMI causing acute systolic ischemic CHF exacerbation: Mr. Morel presented to the [facility] ED for evaluation of chest pain, was found to have anterior STEMI, and transferred here for emergent catheterization. He was found to have severe LAD disease as the culprit lesion and underwent successful PCI with placement of two overlapping drug eluting stents in that artery. He was also found to have occluded RCA. Troponin markedly high. TTE showed EF 30-35% with multiple severe wall motion abnormalities. CXR showed bilateral pleural effusions. LDL was 138, HDL 40. IV diuresis, aspirin, Brilinta, Metoprolol, and Lisinopril were started. He was also started on high dose Amiodarone for new atrial fibrillation.     -- He was switched to oral Torsemide on 5/4; on 5/5 blood pressure was lower and Creatinine increased and since that time his diuresis has been held. His Lisinopril was held on 5/6.     -- He has undergone thoracenteses of his left and right sided effusions with negative fluid studies for infection and negative cytology thus far    -- He remains hypoxic, despite seemingly having become hypovolemic with diuretics. His lungs have fine diffuse crackles. He has developed progressive leukocytosis and serial CXR have shown progressive bilateral patchy opacifications that have worsened despite the diuresis.    -- Thus at this point we suspect an alternative pulmonary pathologic process, especially possible  acuteamiodarone induced lung toxicity. I have discussed his case with Dr. Zuniga today and Amiodarone stopped 5/6.     -- Pulmonology consulted for further evaluation. Steroids could be considered but per Dr. Zuniga these must be used carefully in the setting of his large acute STEMI.    -- Other possible etiology could be pneumonia; however other than the high WBC he has no other clinical signs of pneumonia. Still, for now we will switch Ancef recently started for RUE cellulitis to Ceftriaxone for broader pulmonary coverage, and follow Pulmonary guidance regarding further antibiotic coverage    -- Plan for outpatient consideration of staged intervention, or possible ICD placement if repeat TTE shows persistently diminished LVEF.    -- Plan likely for TCU discharge perhaps Tuesday or Wednesday    2) New onset atrial fibrillation with RVR: [id number] score is 6. EP was consulted at admission and Amiodarone started.    -- Amiodarone now held as above. Plan for now for DAPT for stroke prevention.    3) FRANCY: Creatinine has risen from baseline 0.96 to 1.6. Clinically this would seem to be hypovolemic due to diuresis. Nephrotoxins held and today there is improvement in renal function.     -- Creatinine 1.3; will continue to hold Lasix and Lisinopril and monitor daily    4) Suspected right upper extremity cellulitis: Seen surrounding IV infusion site on 5/3. He has had leukocytosis but no other signs of SIRS at present.    -- Ancef started on 5/3, and the rash is now improved; plan for 10-14 days total antibiotic therapy, transitioned to Ceftriaxone today for now as above    5) Type 2 Diabetes mellitus: Diet controlled as an outpatient. A1c 6.3.     -- ISS insulin while hospitalized    6) Superficial clot in the left cephalic vein: Resolved    DVT Prophylaxis: Pneumatic Compression Devices  Code Status: Full Code    Disposition: Expected discharge middle of next week to TCU with close outpatient Cardiology follow  up    Jorden Nielsen MD    Interval History   No overnight events, no acute complaints today. Remains weak and tired. Denies cough, dyspnea at rest, fever, chills, or sweats.    -Data reviewed today: I reviewed all new labs and imaging results over the last 24 hours. I personally reviewed the chest x-ray image(s) showing progressive bilateral patchy opacifications.    Physical Exam   Temp: 97.5  F (36.4  C) Temp src: Oral BP: 105/53 Pulse: 67 Heart Rate: 70 Resp: 18 SpO2: 99 % O2 Device: Nasal cannula Oxygen Delivery: 2 LPM  Vitals:    05/04/17 0500 05/05/17 0500 05/07/17 0544   Weight: 64 kg (141 lb) 64.8 kg (142 lb 13.7 oz) 64.5 kg (142 lb 3.2 oz)     Vital Signs with Ranges  Temp:  [97.5  F (36.4  C)] 97.5  F (36.4  C)  Pulse:  [67] 67  Heart Rate:  [61-70] 70  Resp:  [18] 18  BP: ()/(51-62) 105/53  SpO2:  [94 %-100 %] 99 %  I/O last 3 completed shifts:  In: 680 [P.O.:680]  Out: 375 [Urine:375]    Constitutional: Alert and oriented to person, place and time; no apparent distress  HEENT: normocephalic moist mucus membranes  Respiratory: lungs with few crackles diffusely to auscultation bilaterally  Cardiovascular: regular S1 S2 no murmurs rubs or gallops  GI: abdomen soft non tender non distended bowel sounds positive  Skin: RUE with persistent streaky erythema over the anterior forearm, much improved; good turgor  Musculoskeletal: no clubbing, cyanosis or edema  Neuro: EOMI; moves all four extremities  Psych: Appropriate affect, insight and judgment      Medications     Percutaneous Coronary Intervention orders placed (this is information for BPA alerting)         cefTRIAXone  1 g Intravenous Q24H     metoprolol  37.5 mg Oral BID     insulin aspart  1-3 Units Subcutaneous TID AC     insulin aspart  1-3 Units Subcutaneous At Bedtime     aspirin EC  81 mg Oral Daily     ticagrelor  90 mg Oral Q12H     atorvastatin  40 mg Oral Daily       Data     Recent Labs  Lab 05/07/17  0535 05/06/17  0514  05/05/17  0535 05/04/17  0521 05/03/17  0637 05/02/17  0545   WBC 17.4*  --   --   --  14.7* 13.2*   HGB 12.7*  --   --   --  13.6 13.3   MCV 89  --   --   --  89 89   *  --   --   --  453* 433    141 141 142 141 140   POTASSIUM 4.3 4.0 3.6 3.9 3.8 3.3*   CHLORIDE 101 101 103 104 103 102   CO2 31 31 30 30 31 29   BUN 54* 52* 44* 42* 36* 37*   CR 1.29* 1.58* 1.44* 1.21 1.15 1.19   ANIONGAP 8 9 8 8 7 9   ROBLES 8.7 8.7 8.1* 8.3* 8.3* 8.4*   * 153* 141* 140* 145* 130*   PROTTOTAL  --   --  6.6* 6.7*  --   --        No results found for this or any previous visit (from the past 24 hour(s)).

## 2017-05-07 NOTE — PROGRESS NOTES
Cardiology Progress Note  Sathish Zuniga MD    Matias Morel is a 81 year old male with past medical history significant for HTN, DM II, and dyslipidemia   admitted on 4/26/2017 with chest pain and was found to have an anterior STEMI.         Assessment and Plan:      1.  Large anterior STEMI, s/p MARILYN to proximal and mid LAD  - residual prox RCA at 70-80%; circ and LMCA with mild disease  - on Brillinta and ASA  - EF 30-35%  - remains on O2  - considering staged PCI at a later time for complete revascularization  - on Toprol XL    - keep holding lisinopril given FRANCY; will resume later  - no diuretic; BUN keeps rising/creat is better     2.  Ischemic cardiomyopathy EF 30-35% with acute systolic heart failure, hypoxia, worsening CXR findings:  was recently diuresed with IV lasix to the point of mild FRANCY.  Had thoracentesis on the L for ~500 cc on 5/4 and on the right 475 cc on 5/5.  CXR on 5/5 and PA/lat CXR today show diffuse bilateral infiltrates that have worsened despite diuresis; pt has persistent diffuse crackles on exam, hypoxemia, rising WBC. High concern for acute amiodarone pulmonary toxicity. Alternatively, may be other inflammatory acute/subacute pulmonary process.  Oxygenation stable on low dose n/c.    - amiodarone was stopped 05/06; would avoid steroids, if possible, given large recent MI.   - pulmonary consultation     3.  Paroxysmal atrial fibrillation: now off amiodarone  - [id number] 6 for (vasc dz, dm II, age, CHF, HTN)  - given DAPT and only brief recent AF, would not add a third anticoagulant at present      4.  DM-II.       5.  Hyperlipidemia/CAD:  Lipitor 40mg        Complex patient.  Discussed with Dr. Nielsen.  Time spent was 35 min.                   Interval History:   Minimal cough; no dyspnea at rest (on O2)          Review of Systems:   C: NEGATIVE for fever, chills, change in weight  E/M: NEGATIVE for ear, mouth and throat problems  R: mild cough, no SOB  CV: NEGATIVE for chest pain,  "palpitations or peripheral edema          Physical Exam:        Blood pressure 105/53, pulse 67, temperature 97.4  F (36.3  C), temperature source Axillary, resp. rate 18, height 1.651 m (5' 5\"), weight 64.5 kg (142 lb 3.2 oz), SpO2 99 %.  Vitals:    05/04/17 0500 05/05/17 0500 05/07/17 0544   Weight: 64 kg (141 lb) 64.8 kg (142 lb 13.7 oz) 64.5 kg (142 lb 3.2 oz)     Vital Signs with Ranges  Temp:  [97.4  F (36.3  C)-97.5  F (36.4  C)] 97.4  F (36.3  C)  Pulse:  [67] 67  Heart Rate:  [61-70] 70  Resp:  [18] 18  BP: ()/(51-62) 105/53  SpO2:  [94 %-100 %] 99 %  I/O's Last 24 hours  I/O last 3 completed shifts:  In: 680 [P.O.:680]  Out: 375 [Urine:375]    EXAM:  A_+O x 2  LUNGS: diffuse crackles  CV: RRR, no S3  ABD: soft, NT  EXTR:  no edema         Medications:          cefTRIAXone  1 g Intravenous Q24H     metoprolol  37.5 mg Oral BID     insulin aspart  1-3 Units Subcutaneous TID AC     insulin aspart  1-3 Units Subcutaneous At Bedtime     aspirin EC  81 mg Oral Daily     ticagrelor  90 mg Oral Q12H     atorvastatin  40 mg Oral Daily            Data:      All new lab and imaging data was reviewed.   Recent Labs   Lab Test  05/07/17   0535  05/03/17   0637  05/02/17   0545   04/26/17   0340   WBC  17.4*  14.7*  13.2*   < >  14.1*   HGB  12.7*  13.6  13.3   < >  15.6   MCV  89  89  89   < >  92   PLT  526*  453*  433   < >  402   INR   --    --    --    --   0.96    < > = values in this interval not displayed.      Recent Labs   Lab Test  05/07/17   0535  05/06/17   0514  05/05/17   0535   NA  140  141  141   POTASSIUM  4.3  4.0  3.6   CHLORIDE  101  101  103   CO2  31  31  30   BUN  54*  52*  44*   CR  1.29*  1.58*  1.44*   ANIONGAP  8  9  8   ROBLES  8.7  8.7  8.1*   GLC  134*  153*  141*     Recent Labs   Lab Test  04/27/17   0525  04/26/17   2207  04/26/17   1355   04/26/17   0347   TROPI  125.570*  >200.000  The 99th percentile for upper reference range is 0.045 ug/L.  Troponin values in   the range of 0.045 " - 0.120 ug/L may be associated with risks of adverse   clinical events.   Previous critical documented  *  >200.000  The 99th percentile for upper reference range is 0.045 ug/L.  Troponin values in   the range of 0.045 - 0.120 ug/L may be associated with risks of adverse   clinical events.   Previous critical documented  *   < >   --    TROPONIN   --    --    --    --   14.44*    < > = values in this interval not displayed.         EKG results:  Reviewed      Imaging:   Recent Results (from the past 24 hour(s))   XR Chest 2 Views    Narrative    XR CHEST 2 VW 5/7/2017 8:48 AM    HISTORY: Hypoxemia, diffuse crackles      Impression    IMPRESSION: Diffuse bilateral pulmonary infiltrates with minimal  bibasilar pleural effusions or pleural thickening. No change compared  to 5/5/2017.    FLORES SÁNCHEZ MD

## 2017-05-07 NOTE — PLAN OF CARE
Problem: Goal Outcome Summary  Goal: Goal Outcome Summary  Outcome: No Change  VSS, BP's soft. A&O X4, forgetful. Pt denies pain and SOB, oxygen stable on 2L NC. Tele SR. Lung sounds coarse throughout. Plan for repeat CXR today. Right arm red and swollen, pt denies pain/tenderness, pulses palpable. WBC 17.4 this AM. Will continue to monitor.

## 2017-05-08 PROBLEM — I21.3 STEMI (ST ELEVATION MYOCARDIAL INFARCTION) (H): Status: ACTIVE | Noted: 2017-01-01

## 2017-05-08 NOTE — PLAN OF CARE
Problem: Goal Outcome Summary  Goal: Goal Outcome Summary  Outcome: Declining  Pt. Had RRT in [facility] and now is on Bipap in CICU.   Bipap settings 70% - Lungs with bilat. Rales coarse.  0950 BNP 29826  ABG's done per RT.   Dr. Zuniga and Dr. Teresa here to assess patient. Echo being done at bedside. 1025 Lasix 40 mg IV given.   Dr. Christina (hospitalist) notified of BGM of 10/11/40. Pt. Given two amps of D50 and Glucose oral x1. Pt. Remained alert and oriented to self and place.   Report given to ICU and transfer at 1100.   Message left with friend on contact list. Pt. States he has no one.

## 2017-05-08 NOTE — PROGRESS NOTES
F/u following cath.    Heart pressures from cardiologist's note as follows:  1. RA 9/7/5   2. RV 53/1/5  3. PA 53/21/32   4. PCW 15/17/14   5. PA sat 66%   6. PCW Oximeter sat, not obtained  7. Hgb 12.9 g/dL   8. Santiago CO 3.93   9. Santiago CI 2.3   10. PVR 4.5     Cardiologist does not think that pt's worsening hypoxia is related to heart failure given relatively normal filling pressures.    Re-reviewed pt's chest xrays; admission xray shows alveolar process in right upper lung zone, abnormality becomes more pronounced by the chest xray 2 days after admission, and is progressively more pronounced. With regard to potential acute amiodarone toxicity, I agree that it is possible, although chest imaging was abnormal prior to the first recorded dose of amiodarone on 5/1. That said, he does have worsening hypoxic respiratory failure without a clear etiology, and I would consider treatment for drug toxicity with steroids.     The patient does not wish to be intubated at this point and is maintaining acceptable sats on Bilevel NIPPV; if he were to agree to intubation I would consider bronchoscopy looking for alveolar hemorrhage, eosinophilic pneumonia, infection, etc. As it stands, I have requested a respiratory virus PCR panel (would consider viral pneumonia on the differential). Will start systemic steroids (drug toxicity doses are typically ~60mg PO prednisone, which seems reasonable); one concern is that alveolar hemorrahge is also on differential and treatment doses for DAH are much higher but unable to bronch to rule out at this time.     Bilateral exudative effusions, 50-60% lymphocytes, both returned nearly 500mL fluid, both done after several days of diuresis (which could change transudate/exudate numbers).

## 2017-05-08 NOTE — PLAN OF CARE
Problem: Respiratory Insufficiency (Adult)  Goal: Identify Related Risk Factors and Signs and Symptoms  Related risk factors and signs and symptoms are identified upon initiation of Human Response Clinical Practice Guideline (CPG)   Received pt from CICU at 1115. Pt  Has had labored breathing with RR 44/mn while on bipap 70%. O2 sats dropped to 89% just prior to going to cath lab at 1330. FIO2 increased to 100% per bipap with improved O2 sats to 95%. To cath lab at 1340.

## 2017-05-08 NOTE — CONSULTS
BRIEF NUTRITION NOTE:    Received routine Nutrition Consult for TLC diet education s/p angiogram --> stent procedure.    NEW FINDINGS:  5/8:  RRT --> Pt developed respiratory distress (bipap)   5/8:  Angiogram --> stent   Note that diet education has already been provided this admission as below:  4/27:  American Heart Association (AHA) Heart Healthy Diet  5/3:  2 gm Na   Per flow sheets, oral intake has been good while on Moderate Consistent CHO, LSF, < 2400 mg Na, Fluid restriction 1200 mL.   Diet education not needed (already done).  Will monitor for ability to advance diet.    Cristal Vang, RD, LD, CNSC

## 2017-05-08 NOTE — PROVIDER NOTIFICATION
MD Notification    Notified Person:  MD    Notified Persons Name: Cooper    Notification Date/Time: 5/8/17 0215    Notification Interaction:  Talked with Physician    Purpose of Notification: Elevated lactic acid    Orders Received: Broadened antibiotics, sputum sample to be collected, recheck lactic in am    Comments:

## 2017-05-08 NOTE — PLAN OF CARE
Problem: Goal Outcome Summary  Goal: Goal Outcome Summary  Outcome: No Change  Pt is a&ox4(forgetful) Denies any CP and SOB. VSS on 3L NC. Pt ambulated to Bathroom with SBA.Steady gait . Pt had Low blood sugar-54 AT 1840.  Pt was asymptomatic and  was up in chair and  having his dinner.Rechecked BS at 2100 - 83.pt had orange juice and simple carbs before bed.Will recheck BS at midnight.Will continue to monitor.

## 2017-05-08 NOTE — H&P
[facility] Intensive Care Unit  History and Physical  May 8, 2017  Matias Morel MRN# [id number]   Age: 81 year old YOB: 1935     Date of Admission: 4/26/2017    Primary care provider: Lalo Kee          Assessment and plan :   Matias Morel is a 81 year old male admitted on 4/26/2017 for STEMI.  . My assessment and plan for this patient is as follows:    1.Neurology:     No acute issues    2. Cardiovascular:     CAD- s/p overlapping MARILYN to LAD    HTN, rx for metoprolol     ICM, EF ~30%; given acute respiratory failure agree with right/left heart cath to re-assess cardiac function, output and volumes tatus    Antiplatelet, high intensity statin per routine    Diuresis- recorded as net negative in EMR, had been requiring torsemide.     Afib- amiodarone for several days, appears to be in sinus on telemetry currently.     3. Pulmonary/Ventilator Management:     Thoracentesis    Hypoxia with pulmonary parenchymal abnormalities: broad differential. Some consideration given to amiodarone toxicity, but would expect amio toxicity with much higher cumulative doses of amiodarone; CXR abnormalities have been present for a little over a week but are getting worse. Other considerations include heart failure or infection. BNP significantly elevated, Echo showing worsening reduction in EF. Pattern on CT not consistent with bacterial infection, but on pip/tazon and levofloxacin. Viral pneumonitis possible but pattern would be unusual. Will add respiratory virus PCR panel and procalcitonin. Would consider reaction to ticegralor, case reports of alveolar hemorrhage associated.    4. GI and Nutrition:    NPO for now on Bilevel NIPPV.        5. Renal/Fluids/Electrolytes:     FRANCY    6. Infectious Disease:     Possible right upper extremity cellulitis, started on cefazolin      7. Endocrine:     DM, has had significant issues with glucose swings in the last 24 hours. Unclear if readings have been  accurate or not- will reassess with BMP once back from cath lab (most recent level 124 by capillary blood draw)        8. Hematology/Oncology:     Persistent leukocytosis since admission- has had low procalcitonin in the past but on and off of antibiotics given concern for infection    10. IV/Access:     Plan for PICC    11. Prophylaxis:   DVT Prophylaxis: Pneumatic Compression Devices  GI Prophylaxis: Not indicated    12. Other:   Restraints: Restraints for medical healing needed: NO  Family update by me today: No- pt has a cousin who is his closest relative, lives in [place]. Pt has not talked to family member but would be OK with cousin making decisions for him if he is unable to    13. Goals for the next 24 hours :     Right and left heart cath    PICC placement    Ongoing evaluation for possible infection.    14. Time Spent on this Encounter   Billing:  I spent 45 minutes bedside and on the inpatient unit today managing the critical care of Matias Morel in relation to the issues listed in this note.    Lines: No erythema or discharge at entry site for No invasive lines  Current lines are required for patient management    Medications     - MEDICATION INSTRUCTIONS -       - MEDICATION INSTRUCTIONS -       dextrose 10% and 0.45% NaCl 50 mL/hr (05/08/17 1005)     NaCl       Percutaneous Coronary Intervention orders placed (this is information for BPA alerting)         ipratropium - albuterol 0.5 mg/2.5 mg/3 mL  3 mL Nebulization Q6H     piperacillin-tazobactam  4.5 g Intravenous Q6H     levofloxacin  750 mg Intravenous Q48H     vancomycin (VANCOCIN) IV  1,750 mg Intravenous Q24H     sodium chloride (PF)  3 mL Intracatheter Q8H     lidocaine 2 %  10 mL Urethral Once     sodium chloride (PF)  10 mL Intracatheter Q8H     metoprolol  37.5 mg Oral BID     insulin aspart  1-3 Units Subcutaneous TID AC     insulin aspart  1-3 Units Subcutaneous At Bedtime     ticagrelor  90 mg Oral Q12H     atorvastatin  40 mg Oral  Daily       Data          Chief Complaint/ Reason for ICU Admission and HPI     Admitted for : STEMI  History obtained from patient, chart review.  History of Present Illness: Pt is an 81 year old man transferred to the ICU for progressive hypoxia.    Initially admitted with STEMI, underwent cath with coronary angiography on 4/26. Had overlapping stents placed in the LAD; had thrombus in proximal LAD with distal disease. Also with RCA stenosis. Echo showed reduced EF; also developed atrial fibrillation. Started on amiodarone infusion, EP consulted.  Noted to have leukocytosis initially with low procalcitonin, so previously ordered levofloxacin was stopped. Persistent leukocytosis with area of erythema on right arm so cefazolin was started; with worsening hypoxia antibiotics were changed to pip/tazo and levofloxacin.    Yesterday developed progressive tachypnea and worsening oxygenation. Had a CT Scan done confirming pulmonary parenchymal process, pulmonary consulted with concern for acute amiodarone toxicity, felt to be relatively unlikely.  Pt started on Bilevel NIPPV with maintenance of adequate oxygenation but transferred to ICU for ongoing management and workup. He tells me that he feels that the Bilevel mask is reasonably comfortable, is short of breath with lying flat. Walked with assistance in the hallway yesterday, no issues with cough.          Past Medical History:     Past Medical History:   Diagnosis Date     Hypertension             Past Surgical History:     Past Surgical History:   Procedure Laterality Date     GENITOURINARY SURGERY              Social History:     Social History     Social History     Marital status: Single     Spouse name: N/A     Number of children: N/A     Years of education: N/A     Occupational History     Not on file.     Social History Main Topics     Smoking status: Never Smoker     Smokeless tobacco: Not on file     Alcohol use No     Drug use: No     Sexual activity: Not on  file     Other Topics Concern     Not on file     Social History Narrative     Smoking: No.   History   Smoking Status     Never Smoker   Smokeless Tobacco     Not on file     Alcohol: No    Alcohol use No     Drug:  No   History   Drug Use No            Family History:   No family history on file.         Allergies:   Please see allergy list which was reviewed this admission.  All allergies reviewed and addressed         Medications:     Current Facility-Administered Medications   Medication     No lozenges or gum should be given while patient on BIPAP     hypromellose-dextran (ARTIFICAL TEARS) ophthalmic solution 1 drop     Patient may continue current oral medications     ipratropium - albuterol 0.5 mg/2.5 mg/3 mL (DUONEB) neb solution 3 mL     piperacillin-tazobactam (ZOSYN) 4.5 g vial to attach to  mL bag     levofloxacin (LEVAQUIN) infusion 750 mg     vancomycin (VANCOCIN) 1,750 mg in NaCl 0.9 % 500 mL intermittent infusion     dextrose 10% and 0.45% sodium chloride infusion (CMPD)     lidocaine 1 % 1 mL     lidocaine (LMX4) cream     sodium chloride (PF) 0.9% PF flush 3 mL     sodium chloride (PF) 0.9% PF flush 3 mL     0.9% sodium chloride infusion     potassium chloride SA (K-DUR/KLOR-CON M) CR tablet 20 mEq     LORazepam (ATIVAN) injection 0.5 mg    Or     LORazepam (ATIVAN) tablet 0.5 mg     lidocaine 2 % (URO-JET) jelly 10 mL     lidocaine (LMX4) cream     sodium chloride (PF) 0.9% PF flush 10-20 mL     sodium chloride (PF) 0.9% PF flush 10 mL     metoprolol (TOPROL-XL) half-tab 37.5 mg     melatonin tablet 3 mg     potassium chloride SA (K-DUR/KLOR-CON M) CR tablet 20-40 mEq     potassium chloride (KLOR-CON) Packet 20-40 mEq     potassium chloride 10 mEq in 100 mL intermittent infusion     potassium chloride 10 mEq in 100 mL intermittent infusion with 10 mg lidocaine     potassium chloride 20 mEq in 50 mL intermittent infusion     magnesium sulfate 4 g in 100 mL sterile water (premade)      metoprolol (LOPRESSOR) injection 2.5-5 mg     glucose 40 % gel 15-30 g    Or     dextrose 50 % injection 25-50 mL    Or     glucagon injection 1 mg     insulin aspart (NovoLOG) inj (RAPID ACTING)     insulin aspart (NovoLOG) inj (RAPID ACTING)     nitroglycerin (NITROSTAT) sublingual tablet 0.4 mg     acetaminophen (TYLENOL) tablet 325-650 mg     HYDROcodone-acetaminophen (NORCO) 5-325 MG per tablet 1-2 tablet     naloxone (NARCAN) injection 0.1-0.4 mg     Percutaneous Coronary Intervention orders placed (this is information for BPA alerting)     ticagrelor (BRILINTA) tablet 90 mg     atorvastatin (LIPITOR) tablet 40 mg            Review of Systems:   A comprehensive review of systems was performed and found to be negative except as described in this note         Physical Exam:   Vitals were reviewed  Physical Exam   Temp: 98.3  F (36.8  C) Temp src: Axillary Temp  Min: 96.8  F (36  C)  Max: 98.9  F (37.2  C) BP: 95/79   Heart Rate: 82 Resp: (!) 40 SpO2: 96 % O2 Device: BiPAP/CPAP Oxygen Delivery: 15 LPM    Vitals:    05/07/17 0544 05/08/17 0500 05/08/17 1115   Weight: 64.5 kg (142 lb 3.2 oz) 64.5 kg (142 lb 3.2 oz) 65 kg (143 lb 4.8 oz)       Intake/Output Summary (Last 24 hours) at 05/08/17 1240  Last data filed at 05/08/17 1200   Gross per 24 hour   Intake              950 ml   Output              750 ml   Net              200 ml       General:   Able to speak in full sentences, but breathing quickly.   Neurologic:   Alert and oriented x 3  Moves all extremities spontaneously, symmetrically and appropriately   HEENT:   Head is atraumatic  BPAP mask in place      Lungs:   Symmetrical chest shape and movements with each tidal breath  Synchronous with NIPPV. No rales noted   Cardiovascular:   Regular rate and rhythm  Normal S1,S2, and no gallop, rub or murmur   Abdomen:   Distended:  No.   Bowel sound present and normal: Yes .   Soft: Yes . Tender: No.   Rebound:tendeness or guarding present No   Extremities:    Clubbing present: No,   Edema present: No,   Acrocyanosis present: Yes ,   Mottling present: Yes ,   Normal capillary refill: No   Skin:   Warm, dry.  No rash on limited exam.    Lines/Drain:    Central line present: No  Arterial line present: No  External ventricular Drain present: No  Chest tube present: No  JACKI present: No  PEG present: No           Ancillary Data:   All laboratory and imaging data reviewed.   ABG/vent data:   FiO2 (%): 70 %  Resp: 40    Recent Labs  Lab 05/08/17  1015 05/08/17  0200   PH 7.47* 7.49*   PCO2 38 36   PO2 67* 79*   HCO3 27 27   O2PER  --  40%        ROUTINE IP LABS    Recent Labs  Lab 05/08/17  0950 05/08/17  0545 05/08/17  0150 05/07/17  0535  05/05/17  0535   WBC  --  21.6* 19.9* 17.4*  --   --    HGB  --  12.9* 13.0* 12.7*  --   --    MCV  --  90 90 89  --   --    PLT  --  506* 521* 526*  --   --    NA  --  141 137 140  < > 141   POTASSIUM  --  4.6 4.4 4.3  < > 3.6   CHLORIDE  --  103 99 101  < > 103   CO2  --  30 29 31  < > 30   BUN  --  45* 50* 54*  < > 44*   CR  --  1.13 1.23 1.29*  < > 1.44*   ANIONGAP  --  8 9 8  < > 8   ROBLES  --  8.5 8.3* 8.7  < > 8.1*   * 140* 259* 134*  < > 141*   ALBUMIN  --   --  2.2*  --   --   --    PROTTOTAL  --   --  7.1  --   --  6.6*   BILITOTAL  --   --  0.5  --   --   --    ALKPHOS  --   --  85  --   --   --    ALT  --   --  34  --   --   --    AST  --   --  39  --   --   --    < > = values in this interval not displayed.  Recent Results (from the past 24 hour(s))   XR Chest Port 1 View    Narrative    CHEST SINGLE VIEW PORTABLE   5/8/2017 1:35 AM     HISTORY: Shortness of breath.    COMPARISON: 5/7/2017 at 0844 hours.    FINDINGS: Extensive airspace opacities scattered throughout both  lungs, moderately increased since the comparison study. Probable  cardiomegaly. Very small bilateral pleural effusions again noted.      Impression    IMPRESSION:   1. Extensive airspace opacities scattered throughout both lungs,  moderately increased since  5/7/2017 at 0844 hours. These could  represent pneumonia or pulmonary edema.  2. Very small bilateral pleural effusions again noted.    MD Kelsey HEALY MD May 8, 2017

## 2017-05-08 NOTE — PROGRESS NOTES
"Significant deterioration of respiratory status overnight.  RRT called at 2 am; CXR looked awful, worse than on 5/7 with progressive bilateral lung infiltrates.  ABG was \"OK\" at 2 am, PCO2 of 36 and adequate oxygenation (FiO2 0.4).  Transferred to CICU.  WBC further elevated to 21.4.    Further deterioration this am with tachypnea, hypoxemia.  BiPaP placed.    Plan:  - ICU transfer  - may need intubation  - ABG now  - STAT echo to r/o mechanical complication of recent anterior MI  - if echo does not show acute cardiac issue ---> consider steroid trial vs bronch with BAL    Discussed with hospitalist.   Critical care time spent was 45 min.      "

## 2017-05-08 NOTE — PROGRESS NOTES
"Upon assessment pt appeared to have difficulty breathing, pt stating he was \"feeling winded.\" Pt denied chest pain. O2 sats in low 80's on 3L NC. Increased oxygen to 15L oxymask, O2 sats still remained in mid 80's. Blood glucose low X2. First reading 77, pt drank 4 oz of orange juice, reassesed after 15 min BG down to 69. Paged on call hospitalist d/t pt increased work of breathing and increased oxygen demands. Received orders for BiPAP, and to continue monitoring blood glucose. Pt O2 sats continued to decrease on 15L oxymask, RRT was called.   "

## 2017-05-08 NOTE — PROGRESS NOTES
[facility] Cardiology Progress Note          Assessment and Plan:   81 year old man admitted on 4/26/2017 after suffering anterior MI. He presented late and had successful PCI with MARILYN placement in the mid and proximal LAD. Cath also showed mod-severe proximal RCA stenosis.    1. Hypoxia  2. Ischemic cardiomyopathy  3. Large anterior STEMI s/p PCI with MARILYN placement in the proximal and mid LAD    - He developed worsening hypoxia and diffuse bilateral pulmonary infiltrates despite aggressive diuresis. Clinically, he doesn't appear to be fluid overloaded. STAT echo this morning shows no mechanical complications and slightly worsen EF (perhaps 25% now)    - Hypoxia and bilateral pulmonary infiltrates concerning for acute amiodarone toxicity vs other inflammatory/infectious process.     - Right and left heart catherization today. If stent is patent and filling pressures are normal would recommend bronchoscopy/BAL. Will discuss this with Intensivist    - Continue DAPT and Toprol. Holding ACEI given recent FRANCY    - Lasix IV once    - Transfer to ICU.       4. PAF  - Now is sinus. Although CHADS2-VASC score is high (6) given the brief nature of the episode and the fact that he is on DAPT will hold off AC.        Interval History:   Worsening hypoxia overnight. Now requiring BiPAP.        Review of Systems:   Unable to obtain. Patient is on BiPAP and on resp distress          Medications:     No current outpatient prescriptions on file.               Physical Exam:   All vitals have been reviewed  Patient Vitals for the past 24 hrs:   BP Temp Temp src Heart Rate Resp SpO2 Weight   05/08/17 1030 95/79 - - 82 23 95 % -   05/08/17 1000 (!) 86/65 - - 77 (!) 39 95 % -   05/08/17 0930 115/64 - - 82 (!) 38 91 % -   05/08/17 0900 (!) 84/56 - - 80 19 94 % -   05/08/17 0852 90/53 - - 78 14 92 % -   05/08/17 0800 100/53 - - 75 (!) 40 99 % -   05/08/17 0743 95/54 98.9  F (37.2  C) Axillary 73 (!) 36 98 % -   05/08/17 0700 94/55  - - 73 (!) 35 96 % -   05/08/17 0600 94/58 - - 74 (!) 34 96 % -   05/08/17 0500 111/63 - - 80 (!) 32 93 % 64.5 kg (142 lb 3.2 oz)   05/08/17 0400 112/55 98  F (36.7  C) Axillary 75 29 97 % -   05/08/17 0330 93/59 - - 75 30 98 % -   05/08/17 0300 125/66 - - 79 (!) 31 97 % -   05/08/17 0230 102/56 - - 74 12 94 % -   05/08/17 0215 93/80 - - 74 28 93 % -   05/08/17 0105 118/68 - - 78 - - -   05/08/17 0011 113/62 98.2  F (36.8  C) Axillary 78 25 90 % -   05/07/17 2042 101/49 - - 74 - - -   05/07/17 2040 101/49 97.8  F (36.6  C) Oral 73 18 94 % -   05/07/17 1921 105/55 97.4  F (36.3  C) Oral 69 18 93 % -   05/07/17 1600 103/52 96.8  F (36  C) Oral 71 18 91 % -   05/07/17 1300 95/57 97.4  F (36.3  C) Axillary 63 - 96 % -       Intake/Output Summary (Last 24 hours) at 05/08/17 1057  Last data filed at 05/08/17 0900   Gross per 24 hour   Intake              600 ml   Output              600 ml   Net                0 ml     Gen: Resp distress  Neck: normal JVP  Lung: diffuse dry crackles  CV: RRR, no murmurs  Ext: warm, no edema  Abd: soft, nt, bs+  Skin: No rash          Data:   All laboratory data reviewed  ROUTINE IP LABS (Last four results)  BMP  Recent Labs  Lab 05/08/17  0950 05/08/17  0545 05/08/17  0150 05/07/17  0535 05/06/17  0514   NA  --  141 137 140 141   POTASSIUM  --  4.6 4.4 4.3 4.0   CHLORIDE  --  103 99 101 101   ROBLES  --  8.5 8.3* 8.7 8.7   CO2  --  30 29 31 31   BUN  --  45* 50* 54* 52*   CR  --  1.13 1.23 1.29* 1.58*   * 140* 259* 134* 153*     CBC  Recent Labs  Lab 05/08/17  0545 05/08/17  0150 05/07/17  0535 05/03/17  0637   WBC 21.6* 19.9* 17.4* 14.7*   RBC 4.26* 4.37* 4.27* 4.52   HGB 12.9* 13.0* 12.7* 13.6   HCT 38.3* 39.5* 38.1* 40.3   MCV 90 90 89 89   MCH 30.3 29.7 29.7 30.1   MCHC 33.7 32.9 33.3 33.7   RDW 15.5* 15.5* 15.5* 15.6*   * 521* 526* 453*     INRNo lab results found in last 7 days.               Attestation:  I have reviewed today's vital signs, notes, medications, labs and  imaging.  Amount of time performed on this hospital visit: 45 minutes.     Alfredo Teresa MD

## 2017-05-08 NOTE — PHARMACY-VANCOMYCIN DOSING SERVICE
Pharmacy Vancomycin Initial Note  Date of Service May 8, 2017  Patient's  1935  81 year old, male    Indication: [facility] Pneumonia    Current estimated CrCl = Estimated Creatinine Clearance: 43 mL/min (based on Cr of 1.23).    Creatinine for last 3 days  2017:  5:35 AM Creatinine 1.44 mg/dL  2017:  5:14 AM Creatinine 1.58 mg/dL  2017:  5:35 AM Creatinine 1.29 mg/dL  2017:  1:50 AM Creatinine 1.23 mg/dL    Recent Vancomycin Level(s) for last 3 days  No results found for requested labs within last 72 hours.      Vancomycin IV Administrations (past 72 hours)      No vancomycin orders with administrations in past 72 hours.                Nephrotoxins and other renal medications (Future)    Start     Dose/Rate Route Frequency Ordered Stop    17 0300  vancomycin (VANCOCIN) 1,750 mg in NaCl 0.9 % 500 mL intermittent infusion      1,750 mg Intravenous EVERY 24 HOURS 17 0249      17 0245  piperacillin-tazobactam (ZOSYN) 4.5 g vial to attach to  mL bag      4.5 g  over 1 Hours Intravenous EVERY 6 HOURS 17 0239            Contrast Orders - past 72 hours     None                Plan:  1.  Start vancomycin  1750 mg IV q24h.   2.  Goal Trough Level: 15-20 mg/L   3.  Pharmacy will check trough levels as appropriate in 1-3 Days.    4. Serum creatinine levels will be ordered daily for the first week of therapy and at least twice weekly for subsequent weeks.    5. Fontana method utilized to dose vancomycin therapy: Method 1    Silvano Marvin

## 2017-05-08 NOTE — PLAN OF CARE
Problem: Goal Outcome Summary  Goal: Goal Outcome Summary   CR: Pt transfer to the intensive care unit do to increase in respiratory needs. Will cancel OT for today check on status.

## 2017-05-08 NOTE — PROGRESS NOTES
PRELIMINARY CARDIAC CATH REPORT:     PROCEDURES PERFORMED:   1. Selective left and right coronary angiography.  2. Right heart catheterization.  3. Percutaneous coronary intervention of the proximal RCA.        PHYSICIANS:  1. Attending Interventional Cardiology Staff: Katelyn Clay MD  2. Interventional Cardiology Fellow: Nick Willis DO       INDICATION:  Matias Morel is a 81 year old male with recent anterior STEMI with PCI to the LAD, he has residual stenosis to the RCA and ischemic heart disease with reduced ejection fraction (LVEF 30-35%), difficulty maintaining O2 sat without supplemental O2. Plan for staged PCI to the RCA and RHC for further evaluation of PA pressures.     DESCRIPTION:  1. Consent obtained with discussion of risks.  All questions were answered.  2. Sterile prep and procedure.  3. Location: left common femoral artery and vein.  4. Access: Local anesthetic with lidocaine.  A micropuncture (21 g) needle was used to establish vascular access using a modified Seldinger technique.  5. Sheath: 6Fr long arterial sheath. 7Fr short venous sheath.  6. Catheters: 6Fr JL-4, 6Fr JR-4 guide. 7Fr SwanGanz catheter  7. Fluoroscopy time of 10.3 min.  8. Estimated blood loss of <10 mL.  9. See below for procedure details.    MEDICATIONS:  1. Contrast 45 mL IV.  2. Heparin administered to achieve a goal ACT > 250 sec.   3. Ticagrelor 90 mg po.      HEMODYNAMICS:  1. HR 85 bpm  2. Ao /55/77 mmHg    RIGHT HEART CATHETERIZATION:  BSA 1.73  1. RA 9/7/5   2. RV 53/1/5  3. PA 53/21/32   4. PCW 15/17/14   5. PA sat 66%   6. PCW Oximeter sat, not obtained  7. Hgb 12.9 g/dL   8. Santiago CO 3.93   9. Santiago CI 2.3   10. PVR 4.5         CORONARY ANGIOGRAM:  1. Both coronary arteries arise from their respective cusps normally.  2. Right dominant.  3. Left Main (LM) large caliber vessel with 10% stenosis.  4. Left Anterior Descending (LAD): is a large caliber type 3 vessel which gives rise to septal perforates  "and two small caliber diagonal vessels. There is a patent stent in the proximal to mid LAD. The two diagonal vessels are jailed by the stent with ostial stenosis, but with ADRIAN 3 flow. The remainder of the vessel has luminal irregularities with < 20% stenosis.   5. Left Circumflex (LCX): large caliber vessel which gives rise to three obtuse marginal vessels. There are luminal irregularities with < 20% stenosis.   6. Right Coronary Artery (RCA): large caliber vessel which gives rise to a posterior descending artery and a posterolateral branch system. There is a linear, \"napkin ring,\" lesion in the proximal RCA with 80 % stenosis. The remainder of the vessel has luminal irregularities with < 20% stenosis.         PERCUTANEOUS CORONARY INTERVENTION:  1. Proximal RCA Lesion:  A 6Fr JR-4 guide catheter was positioned at the ostium of the RCA.  A Runthrough wire was advanced across the lesion and positioned in the PDA.  A 3.0x8mm balloon was used to pre-dilate the lesion.  A 3.5x16mm Promus Premier drug eluting stent was successfully deployed across the lesion with inflation to 14 tracey.  The stent was post-dilated with a 3.5x12mm non-compliant balloon.  Final angiography showed no evidence of perforation or dissection with residual stenosis of 0% and ADRIAN 3 flow.  No complications.      COMPLICATIONS:  1. None    SUMMARY:   1. Staged PCI to the proximal RCA with a 3.5x16mm Promus Premier MARILYN.  2. Patent stent to the proximal LAD.   3. Normal right-sided and normal left-sided filling pressures.  4. Mild pulmonary arterial hypertension with a mean PAP of 32 mmHg, PVR 4.5.  5. Low-Normal cardiac output of 3.93 L/min and cardiac index of 2.3 based on Santiago estimation.      PLAN:   1. Aspirin 81 mg po daily lifelong.  2. Ticagrelor 90 mg po bid for at least 1 year uninterrupted.  3. Bedrest and access site monitoring per protocol.  4. Return to the primary inpatient team for further evaluation and management.  5. Continued " aggressive medical management and lifestyle modification for cardiovascular risk factor optimization.       Nick Willis DO, [facility]  Interventional Cardiology Fellow  [phone]

## 2017-05-08 NOTE — PROGRESS NOTES
Patient now on bipap. WBC 19.9. LA 3.6.   CXR showed increase in extensive airspace opacities.   Will stop ceftriaxone and start zosyn/levaquin/vanco for possible HCAP.    Thaddeus Gamboa MD  [phone]

## 2017-05-08 NOTE — PROGRESS NOTES
Patient was call  RRT for increased work of breathing, and dropping Sat to low 80s. BiPAP of 12/5 60% was placed. SpO2 improved to 93%, but RR remains high 30s  5/8/2017  Ramone Rae

## 2017-05-08 NOTE — PROGRESS NOTES
[facility]    Hospitalist Progress Note    Date of Service: 05/08/2017    Assessment & Plan   Mr. Morel is a markedly pleasant 81 year old gentleman with DM2, HTN, HLD, and BPH s/p TURP who was admitted 4/26/2017 for acute anterior STEMI.  He presented to the [facility] ED for evaluation of chest pain, was found to have anterior STEMI, and transferred here for emergent catheterization. He was found to have severe LAD disease and underwent successful PCI with placement of two overlapping drug eluting stents. Hospitalists have taken over care from cardiology service.  He now has ongoing hypoxia.    Acute anterior STEMI causing acute systolic ischemic CHF exacerbation   S/p emergent cath 4/26 with MARILYN x 2 to LAD, also found to have occluded RCA.  Troponin markedly high  TTE showed EF 30-35% with multiple severe WMA.  LDL was 138, HDL 40  SBP soft, 80's-100's  - Initially diuresed with Lasix and Torsemide, stopped due to worsening renal function  - Continue aspirin, Brilinta  - Metoprolol per cardiology, will likely need to be changed to IV  - Lisinopril held due to FRANCY  - Plan for outpatient consideration of staged intervention, or possible ICD placement if repeat TTE shows persistently diminished LVEF.    Acute hypoxic respiratory failure possibly due to HCAP vs amiodarone toxicity  S/p thoracenteses of his left and right sided effusions with negative fluid studies for infection and negative cytology thus far  Progressive leukocytosis  CXR 5/8 with extensive bilateral airspace opacities, worsening  CT chest 5/7 showing diffuse bilateral pulmonary infiltrates  Current differential includes HCAP +/- ARDS, amiodarone toxicity, CHF (although hypoxia has not improved despite diuresis and weight has been stable to improved)  - Amiodarone stopped 5/6 due to possible amiodarone toxicity  - Appreciate pulmonology consult  - [place] [facility] [place] due to need for BIPAP and started on Vanco, Levaquin and  Zosyn  - Stat echo to look for mechanical complication post STEMI; Per bedside informal read, as discussed with Dr. Zuniga, EF much worse than previous 30-35%, no discrete mechanical finding  - Stat ABG pH 7.47, pCO2 38, pO2 67, HCO3 27  - Transfer to ICU  - Lasix 40mg IV x 1  - Respiratory rate in upper 20's to 30's  - Discussed with intensivist and will consult in event the patient has worsening respiratory failure and may need intubation  - Place Lazaro  - NPO while on BIPAP    ADDENDUM:  Spoke with Dr. Teresa of cardiology who will plan on a left and right heart cath likely today to further rule out any cardiac cause for his worsening EF and respiratory status    New onset atrial fibrillation with RVR  [id number] score is 6. EP was consulted at admission and Amiodarone started.  - Amiodarone now held as above.   - DAPT for stroke prevention.    FRANCY  Cr vishal to 1.6, likely prerenal   - Creatinine 1.13  - Holding lisinopril    Suspected right upper extremity cellulitis  Seen surrounding IV infusion site on 5/3.  - Ancef started on 5/3 with improvement, now covered with antibiotics for HCAP  - plan for 10-14 days total antibiotic therapy    Type 2 Diabetes mellitus with severe hypoglycemia  Diet controlled as an outpatient. A1c 6.3.   Hypoglycemic down to <10 on 5/8 treated with D50 and glucose gel  - Start D10 + 1/2NS  - D50 PRN  - Monitor blood glucose closely  - ISS    Superficial clot in the left cephalic vein: Resolved    DVT Prophylaxis: Pneumatic Compression Devices  Code Status: Full Code, confirmed with patient on 5/8.  Has a son in [place], [place], but contact info not currently available  Disposition: TBD     Time Spent on this Encounter   I spent 40 minutes managing the critical care of Matias Morel in relation to the issues listed in this note.     Khalif Christina MD    Interval History   Worsening respiratory status overnight with CT and CXR showing diffuse bilateral infiltrates.  Antibiotics broadened,  patient transferred to [facility] on BiPAP.  Severe hypoglycemia this morning needing glucose gel and D50.    -Data reviewed today: I reviewed all new labs and imaging results over the last 24 hours. I personally reviewed the chest x-ray image(s) showing progressive bilateral patchy opacifications.    Physical Exam   Temp: 98.9  F (37.2  C) Temp src: Axillary BP: 90/53   Heart Rate: 78 Resp: 14 SpO2: 92 % O2 Device: BiPAP/CPAP Oxygen Delivery: 15 LPM  Vitals:    05/05/17 0500 05/07/17 0544 05/08/17 0500   Weight: 64.8 kg (142 lb 13.7 oz) 64.5 kg (142 lb 3.2 oz) 64.5 kg (142 lb 3.2 oz)     Vital Signs with Ranges  Temp:  [96.8  F (36  C)-98.9  F (37.2  C)] 98.9  F (37.2  C)  Heart Rate:  [63-80] 78  Resp:  [12-40] 14  BP: ()/(49-80) 90/53  FiO2 (%):  [60 %-70 %] 70 %  SpO2:  [90 %-99 %] 92 %  I/O last 3 completed shifts:  In: 950 [P.O.:350; I.V.:600]  Out: 475 [Urine:475]    Constitutional: Alert and oriented to person, place and time; on BiPAP  HEENT: normocephalic moist mucus membranes  Respiratory: Diffuse crackles, tachypneic  Cardiovascular: regular S1 S2 no murmurs rubs or gallops  GI: abdomen soft non tender non distended  Musculoskeletal: Trace BLE edema  Neuro: EOMI; moves all four extremities  Psych: Appropriate affect, insight and judgment      Medications     - MEDICATION INSTRUCTIONS -       - MEDICATION INSTRUCTIONS -       dextrose 10% and 0.45% NaCl       Percutaneous Coronary Intervention orders placed (this is information for BPA alerting)         ipratropium - albuterol 0.5 mg/2.5 mg/3 mL  3 mL Nebulization Q6H     piperacillin-tazobactam  4.5 g Intravenous Q6H     levofloxacin  750 mg Intravenous Q48H     vancomycin (VANCOCIN) IV  1,750 mg Intravenous Q24H     metoprolol  37.5 mg Oral BID     insulin aspart  1-3 Units Subcutaneous TID AC     insulin aspart  1-3 Units Subcutaneous At Bedtime     aspirin EC  81 mg Oral Daily     ticagrelor  90 mg Oral Q12H     atorvastatin  40 mg Oral Daily        Data     Recent Labs  Lab 05/08/17  0545 05/08/17  0150 05/07/17  0535  05/05/17  0535   WBC 21.6* 19.9* 17.4*  --   --    HGB 12.9* 13.0* 12.7*  --   --    MCV 90 90 89  --   --    * 521* 526*  --   --     137 140  < > 141   POTASSIUM 4.6 4.4 4.3  < > 3.6   CHLORIDE 103 99 101  < > 103   CO2 30 29 31  < > 30   BUN 45* 50* 54*  < > 44*   CR 1.13 1.23 1.29*  < > 1.44*   ANIONGAP 8 9 8  < > 8   ROBLES 8.5 8.3* 8.7  < > 8.1*   * 259* 134*  < > 141*   ALBUMIN  --  2.2*  --   --   --    PROTTOTAL  --  7.1  --   --  6.6*   BILITOTAL  --  0.5  --   --   --    ALKPHOS  --  85  --   --   --    ALT  --  34  --   --   --    AST  --  39  --   --   --    < > = values in this interval not displayed.    Recent Results (from the past 24 hour(s))   CT Chest w/o Contrast    Narrative    CT CHEST W/O CONTRAST 5/7/2017 11:36 AM    HISTORY:  infiltrate     TECHNIQUE: Volumetric acquisition of CT images from the lung apices  through the upper abdomen. Radiation dose for this scan was reduced  using automated exposure control, adjustment of the mA and/or KV  according to patient size, or iterative reconstruction technique. Exam  limited without contrast material. If symptoms or clinical concern  persists, contrast study may be helpful in further evaluation.     COMPARISON: None.      Impression    IMPRESSION: Diffuse bilateral pulmonary infiltrates. Portion of this  could be due to fibrosis. Tiny bibasilar pleural effusions. 2 cm  enlarged right paratracheal lymph node. No other findings.    FLORES SÁNCHEZ MD   XR Chest Port 1 View    Narrative    CHEST SINGLE VIEW PORTABLE   5/8/2017 1:35 AM     HISTORY: Shortness of breath.    COMPARISON: 5/7/2017 at 0844 hours.    FINDINGS: Extensive airspace opacities scattered throughout both  lungs, moderately increased since the comparison study. Probable  cardiomegaly. Very small bilateral pleural effusions again noted.      Impression    IMPRESSION:   1. Extensive airspace  opacities scattered throughout both lungs,  moderately increased since 5/7/2017 at 0844 hours. These could  represent pneumonia or pulmonary edema.  2. Very small bilateral pleural effusions again noted.    MARIEL DE LEON MD

## 2017-05-08 NOTE — CONSULTS
While the patient is in the  intensive care unit, the respiratory needs will be addressed by the treating intensivist. Please feel free to contact us once the patient is no longer in the ICU.    Reji Lin  [facility]  Office: [phone]  Pager: [phone]

## 2017-05-08 NOTE — PROVIDER NOTIFICATION
Dr. Christina (hospitalist) notified of BGM of 11,10 and now 40.  Pt. Given D5W- amp. x2 and oral glucose. Pt. On Bipap 70%.   Ordered IV fluids D101/2 NS at 50cc/hr.

## 2017-05-08 NOTE — PLAN OF CARE
Problem: Goal Outcome Summary  Goal: Goal Outcome Summary  Outcome: No Change  Transferred to CCU overnight for hypoxia on bipap. Sating well at 60% FIO2. Requested to take off bipap this am- sating mid 90% on 10L oxymask. Pt is confused at times. Lactic acid level elevated, abx x3 ordered. Echo ordered today.

## 2017-05-08 NOTE — PROGRESS NOTES
Patient remains on BiPAP 14/7 80%, SpO2 mid 90's. Gel pad/ mepilex placed over bridge of nose.  Redness noted on bridge of nose/forehead from patient pulling on mask.  Total mask placed to prevent further breakdown.  Getting scheduled nebs.  Lung sounds diminished.  Will continue to follow.

## 2017-05-08 NOTE — PROVIDER NOTIFICATION
Dr. Zuniga here to see patient. Pt tachypneic with RR 44-48 on BiPap 70% FiO2, sats 90-93%. Cxray from today is much worse than yesterday, ?amio toxicity causing resp failure vs ?infectious process.  ?Needs diagnostic bronchoscpy. Per Dr. Zuniga, get STAT echo, ABG, transfer to ICU.      Susy Glasgow RN

## 2017-05-08 NOTE — PROGRESS NOTES
Echo shows severe LV dysfunction, EF much worse than the previously reported 30-35%.  No apparent mechanical complication, such as acute MR, VSD.    BP is soft, in the 80s.    Plan:  - furosemide 40 mg iv given STAT  - d/w Dr. Teresa

## 2017-05-08 NOTE — PROVIDER NOTIFICATION
This note also relates to the following rows which could not be included:  Heart Rate - Cannot attach notes to unvalidated device data  Resp - Cannot attach notes to unvalidated device data  SpO2 - Cannot attach notes to unvalidated device data

## 2017-05-08 NOTE — PROGRESS NOTES
SW:  D:HARRIS request for location of next of kin.  I: HARRIS reviewed pt chart, called clinic, checked white pages, facebook etc in attempt to locate Delmer Moscoso. CC states that pt told physician that his cousin lived in [facility] and could speak for him if he was unable to speak for himself. Pt has been out of contact with his cousin and did not have a number for him. Pt's friend listed on face sheet is not answering phone but message was left to call ICU unit.  P: HARRIS following as needed.

## 2017-05-08 NOTE — PROGRESS NOTES
Per Roselyn and Dr Sumner, pt has a cousin in [place] named Delmer Moscoso who he would want to be his decision  maker in the event he cannot decide for himself.  Pt does not know of his phone number.  Info relayed to the .

## 2017-05-08 NOTE — PROGRESS NOTES
RRT - respiratory distress    I was paged for RRT at 12:50. On arrival, pt was on 15L facemask with pulse ox ~86% w/ good waveform. Auscultation of lungs showed course breath sounds bilaterally with decreased aeration to bases without any wheezes. No JVD identified and bedside u/s showed collapsible IVC. EKG was ST seg elevations that were unchanged from prior. STAT portable CXR showing moderately worsening bilateral infiltrates. Labs were drawn and showing worsening leukocytosis and elevated lactate to 3.6. During workup, pt was placed on bipap and his RR was stable in mid-20's with O2 saturations ~95 on 60% FiO2. This was weaned to 40% FiO2. ABG ~30min after starting Bipap showed pH 7.49 / 36 / 79. PaO2/FiO2 = ~200. Most likely this represents interval worsening of his lung injury w/ concern for ARDS. Etiology possibly cardiogenic pulmonary edema, but with collapsible IVC and lack of elevated JVP it makes volume overload less likely. Pulmonary consulted today and thought amio toxicity low likelyhood. Given worsening leukocytosis and elevated LA, possible HCAP. Discussed with hospitalist on call who broadened abx.     Plan:  -Transfer to CICU w/ Bipap  - NPO  - broaden abx  - further cares per Hospitalist team    Eric Kumar MD   [phone]

## 2017-05-08 NOTE — PROVIDER NOTIFICATION
Pt tachypnic with RR 40-44/mn. FIO2 increased to 100% to maintain O2 sat >92. To IR per cart. Report called.

## 2017-05-09 NOTE — PROGRESS NOTES
Pt is breathing on the low 40's, with MV 25,26. WBC elevated. SpO2 96%. Will follow .5/9/2017  Martha Barroso

## 2017-05-09 NOTE — PROGRESS NOTES
[facility] Cardiology Progress Note          Assessment and Plan:   81 year old man admitted on 4/26/2017 after suffering anterior MI. He presented late and had successful PCI with MARILYN placement in the mid and proximal LAD. Cath also showed mod-severe proximal RCA stenosis.    1. Hypoxia  2. Ischemic cardiomyopathy  3. Large anterior STEMI s/p PCI with MARILYN placement in the proximal and mid LAD  4. Status post PCI with MARILYN placement in the proximal RCA    - He developed worsening hypoxia and diffuse bilateral pulmonary infiltrates despite aggressive diuresis. Right and left heart cath from yesterday reviewed. His left and right heart filling pressures are normal. Residual RCA lesion was stented    - Hypoxia and bilateral pulmonary infiltrates concerning for acute amiodarone toxicity vs other inflammatory/infectious process. He is currently on Prednisone. Appreciate intensivists help    - Continue DAPT and Toprol. Holding ACEI given recent FRANCY      4. PAF  - Due to hypotension, last evening's toprol dose was held. He is in narrow complex tachycardia this morning , likely Afib/flutter. STAT ECG. Will give toprol dose this morning and consider adding digoxin    - CHADS2-VASC score is high (6) but since he is on DAPT will hold off AC for now        Interval History:   Remained hypoxic requiring BiPAP. Episode of hypotension and tachycardia last night.        Review of Systems:   Unable to obtain. Patient is on BiPAP and on resp distress          Medications:     No current outpatient prescriptions on file.               Physical Exam:   All vitals have been reviewed  Patient Vitals for the past 24 hrs:   BP Temp Temp src Heart Rate Resp SpO2 Weight   05/09/17 0538 - - - - - - 67.1 kg (147 lb 14.9 oz)   05/09/17 0515 92/43 - - 81 (!) 36 95 % -   05/09/17 0500 107/53 - - 90 15 95 % -   05/09/17 0445 (!) 78/47 - - 105 (!) 46 94 % -   05/09/17 0430 (!) 115/91 - - 83 11 95 % -   05/09/17 0415 98/63 - - 78 (!) 40 96 % -    05/09/17 0400 (!) 69/34 - - 133 (!) 31 93 % -   05/09/17 0345 (!) 71/55 - - 126 (!) 31 94 % -   05/09/17 0330 96/64 - - 67 (!) 35 94 % -   05/09/17 0315 (!) 88/51 - - 75 (!) 39 96 % -   05/09/17 0300 (!) 87/49 - - 96 (!) 50 94 % -   05/09/17 0245 (!) 89/54 - - 68 (!) 48 93 % -   05/09/17 0230 98/53 - - 76 (!) 35 94 % -   05/09/17 0215 94/58 - - 73 (!) 41 95 % -   05/09/17 0200 (!) 78/49 - - 73 (!) 43 97 % -   05/09/17 0145 (!) 76/46 - - 74 (!) 31 96 % -   05/09/17 0130 (!) 74/49 - - 71 (!) 35 93 % -   05/09/17 0115 105/57 - - 75 (!) 38 97 % -   05/09/17 0100 96/54 - - 76 27 94 % -   05/09/17 0045 (!) 80/46 - - 77 20 95 % -   05/09/17 0030 (!) 74/30 - - 65 (!) 39 96 % -   05/09/17 0015 (!) 78/54 - - 74 11 94 % -   05/09/17 0001 - - - - - 97 % -   05/09/17 0000 100/54 - - 75 (!) 40 98 % -   05/08/17 2345 (!) 88/50 - - 73 (!) 33 97 % -   05/08/17 2330 (!) 78/46 - - 71 (!) 35 98 % -   05/08/17 2315 (!) 88/47 - - 70 (!) 46 95 % -   05/08/17 2300 (!) 87/52 - - 73 26 99 % -   05/08/17 2245 (!) 86/54 - - 70 28 96 % -   05/08/17 2230 101/78 - - 77 25 96 % -   05/08/17 2215 100/78 - - 73 (!) 45 97 % -   05/08/17 2200 105/57 98.5  F (36.9  C) Axillary 75 (!) 32 98 % -   05/08/17 2145 91/65 - - 77 (!) 44 98 % -   05/08/17 2130 121/64 - - 85 (!) 47 95 % -   05/08/17 2115 98/56 - - 77 29 94 % -   05/08/17 2100 101/67 - - 78 (!) 43 95 % -   05/08/17 2045 126/69 - - 79 26 94 % -   05/08/17 2030 102/70 - - 72 19 95 % -   05/08/17 2015 (!) 86/49 - - 72 (!) 49 94 % -   05/08/17 2004 - - - - - 96 % -   05/08/17 2000 90/50 98.6  F (37  C) Axillary 73 (!) 38 96 % -   05/08/17 1945 (!) 70/51 - - 74 (!) 48 93 % -   05/08/17 1930 (!) 93/39 - - 75 (!) 40 96 % -   05/08/17 1920 99/59 - - 74 (!) 45 97 % -   05/08/17 1915 - - - 79 30 97 % -   05/08/17 1910 (!) 84/51 - - 73 27 96 % -   05/08/17 1900 (!) 90/33 - - 75 30 93 % -   05/08/17 1850 94/56 - - 78 (!) 43 95 % -   05/08/17 1840 98/54 - - 80 (!) 50 94 % -   05/08/17 1830 (!) 89/48 - - 81  (!) 40 (!) 89 % -   05/08/17 1820 - - - 84 22 90 % -   05/08/17 1815 - - - 80 16 96 % -   05/08/17 1800 110/56 - - 81 19 96 % -   05/08/17 1745 - 98.4  F (36.9  C) Axillary 85 (!) 42 90 % -   05/08/17 1730 91/47 - - 82 23 (!) 89 % -   05/08/17 1715 96/47 - - 86 17 97 % -   05/08/17 1700 105/64 - - 85 (!) 51 96 % -   05/08/17 1645 92/54 - - 82 (!) 40 96 % -   05/08/17 1630 111/59 - - 83 (!) 37 93 % -   05/08/17 1615 109/65 - - 83 (!) 32 (!) 87 % -   05/08/17 1600 107/58 98.2  F (36.8  C) Axillary 80 (!) 47 90 % -   05/08/17 1545 98/62 - - 80 (!) 35 (!) 87 % -   05/08/17 1340 - - - - (!) 40 - -   05/08/17 1335 - - - 82 (!) 40 91 % -   05/08/17 1325 - - - - (!) 40 - -   05/08/17 1309 - - - - - 96 % -   05/08/17 1225 - - - - (!) 40 - -   05/08/17 1115 - 98.3  F (36.8  C) Axillary 82 (!) 40 96 % 65 kg (143 lb 4.8 oz)   05/08/17 1100 - - - 86 (!) 37 92 % -   05/08/17 1030 95/79 - - 82 23 95 % -   05/08/17 1000 (!) 86/65 - - 77 (!) 39 95 % -   05/08/17 0930 115/64 - - 82 (!) 38 91 % -       Intake/Output Summary (Last 24 hours) at 05/08/17 1057  Last data filed at 05/08/17 0900   Gross per 24 hour   Intake              600 ml   Output              600 ml   Net                0 ml     Gen: Resp distress  Neck: normal JVP  Lung: diffuse dry crackles  CV: tachycardic, no murmurs  Ext: warm, no edema  Abd: soft, nt, bs+  Skin: No rash  Ext: no edema          Data:   All laboratory data reviewed  ROUTINE IP LABS (Last four results)  BMP    Recent Labs  Lab 05/09/17  0400 05/08/17  0950 05/08/17  0545 05/08/17  0150 05/07/17  0535   *  --  141 137 140   POTASSIUM 4.8  --  4.6 4.4 4.3   CHLORIDE 109  --  103 99 101   ROBLES 7.5*  --  8.5 8.3* 8.7   CO2 25  --  30 29 31   BUN 40*  --  45* 50* 54*   CR 1.37*  --  1.13 1.23 1.29*   * 294* 140* 259* 134*     CBC    Recent Labs  Lab 05/09/17  0400 05/08/17  0545 05/08/17  0150 05/07/17  0535   WBC 27.6* 21.6* 19.9* 17.4*   RBC 3.69* 4.26* 4.37* 4.27*   HGB 11.0* 12.9* 13.0*  12.7*   HCT 33.1* 38.3* 39.5* 38.1*   MCV 90 90 90 89   MCH 29.8 30.3 29.7 29.7   MCHC 33.2 33.7 32.9 33.3   RDW 15.4* 15.5* 15.5* 15.5*    506* 521* 526*     INRNo lab results found in last 7 days.               Attestation:  I have reviewed today's vital signs, notes, medications, labs and imaging.  Amount of time performed on this hospital visit: 30 minutes    Alfredo Teresa MD

## 2017-05-09 NOTE — PLAN OF CARE
Problem: Goal Outcome Summary  Goal: Goal Outcome Summary  OT/CR: Spoke with nursing, pt not appropriate for OT/CR today, pt on bipap with respirations in the 40's.

## 2017-05-09 NOTE — PROGRESS NOTES
[facility]: Comprehensive   Intensive Care Daily Note          Assessment and Plan:     Summary Statement:  Matias Morel is a 81 year old male admitted on 4/26/2017 for STEMI. My assessment and plan for this patient is as follows:    Neurology:   Pt alert/oriented, but intermittently tries to remove BPAP mask; used dexmedetomidine overnight but limited by hypotension    Could consider quetipine if needed for sundowning    Continue reorientation             Cardiovascular/Hemodynamics:   Atrial fibrillation; has been on amiodarone for rate/rhythm control, returned to afib overnight with associated hypotension    Monitor response to metoprolol today- if still with RVR agree with digoxin (reassess ~2pm)    MARILYN to RCA and LAD, on aspirin and ticagrelor    Ischemic cardiomyopathy with low EF    Goal I/O even, NT pro BNP yesterday 13K            Pulmonary:   Hypoxic respiratory failure. Ongoing discussion regarding acute amiodarone toxicity- likely a diagnosis of exclusion; CT does not help rule in/out amiodarone toxicity. Bronchoscopy would be helpful to rule out other causes (alveolar hemorrhage, eosinophilic pneumonia, infection, etc) but is not feasible at this time given pt declining intubation and high oxygen needs. On antibiotics for possible pneumonia and steroids for drug toxicity, will keep pt i/o net even.    Pleural effusions, exudative (exudate in the setting of diuresis could represent heart failure related pleural effusions, but not a clear picture at this point). Cytology pending           GI and Nutrition:   NPO while on bilevel NIPPV, OK for sips/chips for comfort           Renal, Fluid and Electrolytes:   FRANCY from baseline, but cr currently stable. Goal I/O even, may need intermittent diuretic dose to maintain    Hyperkalemia, mild this am, will repeat BMP at 2pm           Infectious Disease:   Procalcitonin 0.16, low risk for systemic infection but possible presence of localized  infection. Is currently on pip/tazo, vanc and levofloxacin, agree with continuing for now    Respiratory virus PCR panel is pending.           Hematology and Oncology:   Persistent leukocytosis, increase this morning after starting steroids. No additional indications of infection at this point, continue daily CBC.             Endocrinology:   H/o DM, A1C 6.4, on sliding scale. Goal -180           Intensive Care: Central line: PICC- tip appears within right atrium will request that it be pulled back 2cm  Arterial line: No arterial line present  Restraint:Not needed   Others:   Skin: no issues    FASTHUG addressed as appropriate   Top 3 main goals for today Heart rate control  I/O even  Wean oxygen       Billin min spent for critical care exclusive of time for procedure           Hospital Course: date:  procedure, complication, diagnosis, treatment       Admitted  with STEMI    Had MARILYN to LAD    Echo showed reduced EF    Started on amiodarone  for Afib with RVR    Initially on levofloxacin, but procalcitonin was low so this was stopped    Worsening hypoxia/tachypnea     Started on pip/tazo, vanc, levofloxacin    Pulmonary consulted    Transferred to ICU  on BPAP    RHC : RA 9, RV 53/1, PA 53/21, mPAP 32, PCWP 15, CI 2.3    Left heart cath, stent placed to RCA    Methylprednisolone 40mg IV daily started         Key events/ Interval history - last 24 hours:    Cath yesterday, normal/high filling pressures  Back into Afib, started on esmolol now off again  Bilevel NIPPV overnight  Trial dex due to pt picking at his mask, now off again due to hypotension  Started on prednisone for possible pulmonary drug toxicity  Respiratory virus PCR Panel pending WBC still climbing but afebrile, procalcitonin still low 0.16            Problem list:     Hypoxia    Ischemic cardiomyopathy    STEMI    FRANCY    Leukocytosis            Medications:   I have reviewed this patient's current medications             "Physical Exam:   Blood pressure 92/43, pulse 67, temperature 98.5  F (36.9  C), temperature source Axillary, resp. rate (!) 36, height 1.651 m (5' 5\"), weight 67.1 kg (147 lb 14.9 oz), SpO2 95 %.      Vital Sign Ranges  Temperature Temp  Av.4  F (36.9  C)  Min: 98.2  F (36.8  C)  Max: 98.6  F (37  C)   Blood pressure Systolic (24hrs), Av , Min:69 , Max:126        Diastolic (24hrs), Av, Min:30, Max:91      Pulse No Data Recorded   Respirations Resp  Av.4  Min: 11  Max: 51   Pulse oximetry SpO2  Av.5 %  Min: 87 %  Max: 99 %         Intake/Output Summary (Last 24 hours) at 17 0842  Last data filed at 17 0800   Gross per 24 hour   Intake          1474.65 ml   Output             1340 ml   Net           134.65 ml         General Appearance:   Comfortable appearing but tachypneic   HEENT:   Full face mask with NIPPV in place   Chest and Lungs:   RUL bronchial breath sounds, no rales or wheezes   Cardiovascular:   Between NSR and afib with RVR during my exam, no murmurs noted.   Abdomen:   Non-distended, soft, normal bowel sounds   :   Lazaro in place   Lymphatics:   Lymph nodes not enlarged   Musculoskeletal:   Full range of motion   Extremities and Skin:   Skin is intact  Mottling over lower extremities   Neuro:   Alert and oriented x 3   Dressings:   No dressings present   Drains and Tubes:   No drains or tubes present   Intravascular Access and Device:   Lines present: PICC line (right upper)   Additional exam findings:   None             Data:   All lab results reviewed past 24 hours  All imaging results reviewed past 24 hours  All cardiac studies reviewed by me.  All imaging studies reviewed by me.      "

## 2017-05-09 NOTE — PROGRESS NOTES
[facility]    Hospitalist Progress Note    Date of Service: 05/09/2017    Assessment & Plan   Mr. Morel is a markedly pleasant 81 year old gentleman with DM2, HTN, HLD, and BPH s/p TURP who was admitted 4/26/2017 for acute anterior STEMI.  He presented to the [facility] ED for evaluation of chest pain, was found to have anterior STEMI, and transferred here for emergent catheterization. He was found to have severe LAD disease and underwent successful PCI with placement of two overlapping drug eluting stents. Hospitalists have taken over care from cardiology service.  He now has ongoing hypoxia.    Acute anterior STEMI causing acute systolic ischemic CHF exacerbation   S/p emergent cath 4/26 with MARILYN x 2 to LAD, also found to have occluded RCA.  Troponin markedly high  TTE showed EF 30-35% with multiple severe WMA.  LDL was 138, HDL 40  SBP soft, 80's-100's  - Initially diuresed with Lasix and Torsemide, stopped due to worsening renal function  - Continue aspirin, Brilinta  - Metoprolol per cardiology, held due to hypotension  - Lisinopril held due to FRANCY  - Plan for outpatient consideration of staged intervention, or possible ICD placement if repeat TTE shows persistently diminished LVEF.    Acute hypoxic respiratory failure possibly due to HCAP vs amiodarone toxicity  S/p thoracenteses of his left and right sided effusions with negative fluid studies for infection and negative cytology thus far  Progressive leukocytosis  CXR 5/8 with extensive bilateral airspace opacities, worsening  CT chest 5/7 showing diffuse bilateral pulmonary infiltrates  Current differential includes HCAP +/- ARDS, amiodarone toxicity, CHF (although hypoxia has not improved despite diuresis and weight has been stable to improved)  - Amiodarone stopped 5/6 due to possible amiodarone toxicity  - Initiated Solumedrol 40mg daily on 5/8  - Transferred to ICU on 5/8 due to need for BIPAP and started on Vanco, Levaquin and Zosyn  -  Greatly appreciate intensivist assistance  - Lazaro  - NPO while on BIPAP    New onset atrial fibrillation with RVR  [id number] score is 6.  - Amiodarone now held as above.   - Cardiology likely planning on digoxin  - DAPT for stroke prevention.    FRANCY  Cr vishal to 1.6, likely prerenal   - Creatinine 1.13  - Holding lisinopril    Suspected right upper extremity cellulitis  Seen surrounding IV infusion site on 5/3.  - Ancef started on 5/3 with improvement, now covered with antibiotics for HCAP  - plan for 10-14 days total antibiotic therapy    Type 2 Diabetes mellitus with severe hypoglycemia  Diet controlled as an outpatient. A1c 6.3.   Hypoglycemic down to <10 on 5/8 treated with D50 and glucose gel  - On D10 + 1/2NS  - D50 PRN  - Monitor blood glucose closely  - ISS    Superficial clot in the left cephalic vein: Resolved    DVT Prophylaxis: Pneumatic Compression Devices  Code Status: DNR/DNI  Disposition: TBD       Khalif Christina MD    Interval History   Transferred to ICU yesterday due to ongoing BiPAP needs and possible need for intubation.  His code status was changed to DNR/DNI last night.  Remains on BiPAP.  Trial of Precedex yesterday for mild sedation, but patient became increasingly hypotensive.      -Data reviewed today: I reviewed all new labs and imaging results over the last 24 hours. I personally reviewed the chest x-ray image(s) showing progressive bilateral patchy opacifications.    Physical Exam   Temp: 98.3  F (36.8  C) Temp src: Axillary BP: 95/57   Heart Rate: 77 Resp: (!) 34 SpO2: 95 % O2 Device: BiPAP/CPAP    Vitals:    05/08/17 0500 05/08/17 1115 05/09/17 0538   Weight: 64.5 kg (142 lb 3.2 oz) 65 kg (143 lb 4.8 oz) 67.1 kg (147 lb 14.9 oz)     Vital Signs with Ranges  Temp:  [98.2  F (36.8  C)-98.6  F (37  C)] 98.3  F (36.8  C)  Heart Rate:  [] 77  Resp:  [11-51] 34  BP: ()/(30-91) 95/57  FiO2 (%):  [40 %-100 %] 70 %  SpO2:  [87 %-99 %] 95 %  I/O last 3 completed shifts:  In: 1474.65  [I.V.:1474.65]  Out: 1265 [Urine:1265]    Constitutional: Alert and oriented to person, place and time; on BiPAP  HEENT: normocephalic moist mucus membranes  Respiratory: Diffuse crackles, tachypneic  Cardiovascular: regular S1 S2 no murmurs rubs or gallops  GI: abdomen soft non tender non distended  Musculoskeletal: Trace BLE edema  Neuro: EOMI; moves all four extremities  Psych: Appropriate affect, insight and judgment      Medications     esmolol Stopped (05/09/17 0613)     - MEDICATION INSTRUCTIONS -       - MEDICATION INSTRUCTIONS -       dextrose 10% and 0.45% NaCl Stopped (05/08/17 1611)     Percutaneous Coronary Intervention orders placed (this is information for BPA alerting)       - MEDICATION INSTRUCTIONS -       - MEDICATION INSTRUCTIONS -       Continuing ACE inhibitor/ARB from home medication list OR ACE inhibitor/ARB order already placed during this visit       - MEDICATION INSTRUCTIONS -       dexmedetomidine Stopped (05/09/17 0440)     Percutaneous Coronary Intervention orders placed (this is information for BPA alerting)         insulin aspart  1-6 Units Subcutaneous Q4H     ipratropium - albuterol 0.5 mg/2.5 mg/3 mL  3 mL Nebulization Q6H     piperacillin-tazobactam  4.5 g Intravenous Q6H     levofloxacin  750 mg Intravenous Q48H     vancomycin (VANCOCIN) IV  1,750 mg Intravenous Q24H     lidocaine 2 %  10 mL Urethral Once     sodium chloride (PF)  10 mL Intracatheter Q8H     sodium chloride (PF)  3 mL Intracatheter Q8H     aspirin EC  81 mg Oral Daily     methylPREDNISolone  40 mg Intravenous Q24H     metoprolol  37.5 mg Oral BID     ticagrelor  90 mg Oral Q12H     atorvastatin  40 mg Oral Daily       Data     Recent Labs  Lab 05/09/17  0400 05/08/17  0950 05/08/17  0545 05/08/17  0150  05/05/17  0535   WBC 27.6*  --  21.6* 19.9*  < >  --    HGB 11.0*  --  12.9* 13.0*  < >  --    MCV 90  --  90 90  < >  --      --  506* 521*  < >  --    *  --  141 137  < > 141   POTASSIUM 4.8  --   4.6 4.4  < > 3.6   CHLORIDE 109  --  103 99  < > 103   CO2 25  --  30 29  < > 30   BUN 40*  --  45* 50*  < > 44*   CR 1.37*  --  1.13 1.23  < > 1.44*   ANIONGAP 11  --  8 9  < > 8   ROBLES 7.5*  --  8.5 8.3*  < > 8.1*   * 294* 140* 259*  < > 141*   ALBUMIN  --   --   --  2.2*  --   --    PROTTOTAL  --   --   --  7.1  --  6.6*   BILITOTAL  --   --   --  0.5  --   --    ALKPHOS  --   --   --  85  --   --    ALT  --   --   --  34  --   --    AST  --   --   --  39  --   --    < > = values in this interval not displayed.    Recent Results (from the past 24 hour(s))   XR Chest Port 1 View    Narrative    XR CHEST PORT 1 VW 5/9/2017 9:15 AM    COMPARISON: 5/8/2017    HISTORY: Intubated and ventilated.      Impression    IMPRESSION: Patchy mixed interstitial and airspace opacities over both  lungs are again seen, stable to slightly decreased in prominence.  Likely trace bilateral pleural effusions. No pneumothorax seen on  either side. Endotracheal tube is not visualized. Right upper  extremity PICC tip at the atrial caval junction.    FANTA RIVERA

## 2017-05-09 NOTE — PROGRESS NOTES
Patient remains on BIPAP 14/7 and 80% throughout the shift with SpO2 94%, BBS diminished, all nebs tx is given. Patient tolerated well on BIPAP. Will continue to monitor.    Lester Gaytan RRT  5/9/2017

## 2017-05-09 NOTE — PROVIDER NOTIFICATION
Called tele hub regarding patient's hypotension and intermittent afib w/ RVR. Precedex dose had been cut in half earlier in shift d/t hypotension. Now off completely. Discussed with SHELLEY Meléndez patient's DNI only status. Awaiting orders from MD at this time.

## 2017-05-10 NOTE — PROGRESS NOTES
Patient is on continuous BiPAP 12/8 100% with SpO2 in the upper 80's to low 90's. An ABG order was placed. This writer asked the MD to verify the ABG order due to pt being DNR/DNI and being on FiO2 100%. The MD then gave a verbal order that the ABG was no longer needed.  5/10/2017  Susy Tran RRT

## 2017-05-10 NOTE — PROGRESS NOTES
Social Work Progress Note  Pt chart reviewed. Pt discussed in interdisciplinary rounds.     Intervention: Alexandro called [phone], and searched white pages trying to obtain pt cousin's Delmer Allison's number. Alexandro was able to find it on white pages and spoke with Delmer ([phone]). Delmer explained that pt's niece lives in [place] and can make decisions over his body. Alexandro attempted to contact Lily and her  Timbo ([phone]) but left them a voice message. Delmer did confirm that if alexandro is unable to reach Lily, he would be willing to make decisions on pt's behalf.    Follow-up plan: Alexandro to continue to follow.     MCKENNA Sanders, [facility]  [phone]    ADDENDUM:   I/P: Alexandro received a call from Delmer ([phone]) and he explained that the number he gave to alexandro for Lily is no longer her number. Alexandro redirected Delmer to patient  Perla. Perla will help guide Delmer through this process.     MCKENNA Sanders, [facility]  [phone]

## 2017-05-10 NOTE — PROGRESS NOTES
Comfort orders received. Bipap removed per pt's request.  Time of death is 0310.   Pt's belongings will accompany the body to the morgue.

## 2017-05-10 NOTE — PROGRESS NOTES
House MD note. Re: Pronouncement.    Patient pronounced dead at 03:10 hours today. No spontaneous respirations. No S1-S2.    Berry Farah MD  5/10/2017  3:34 AM

## 2017-05-10 NOTE — PROGRESS NOTES
Pt on bipap at 100% since 2000.  O2 sats high 80s-low 90s.  RR 40s-50s.  Pt desats with oral care and it is difficult for him to recover.  Pt A&Ox4. At 2340, pt expressed wish to take mask off.  He said he doesn't want to keep fighting.  Hospitalist paged regarding re-evaluation of pt for possible comfort care.  Awaiting call back.

## 2017-05-10 NOTE — DISCHARGE SUMMARY
[facility]    Death Summary  Hospitalist    Date of Admission:  4/26/2017  Date of Death:         5/10/2017, 0310  Provider Completing Death Summary: Khalif Christina    Discharge Diagnoses   Acute anterior STEMI causing acute systolic ischemic CHF exacerbation  Acute hypoxic respiratory failure possibly due to HCAP vs amiodarone toxicity resulting in death  Comfort cares  New onset atrial fibrillation with RVR  FRANCY  Suspected right upper extremity cellulitis  Type 2 Diabetes mellitus with severe hypoglycemia  Superficial clot in the left cephalic vein    History of Present Illness   Mr. Morel is a markedly pleasant 81 year old gentleman with DM2, HTN, HLD, and BPH s/p TURP who was admitted 4/26/2017 for acute anterior STEMI.  He presented to the [facility] ED for evaluation of chest pain, was found to have anterior STEMI, and transferred here for emergent catheterization. He was found to have severe LAD disease and underwent successful PCI with placement of two overlapping drug eluting stents. Hospitalists eventually took over care from cardiology service due to ongoing hypoxia.    Hospital Course   Matias Morel was admitted on 4/26/2017.  The following problems were addressed during his hospitalization:    Acute anterior STEMI causing acute systolic ischemic CHF exacerbation   S/p emergent cath 4/26 with MARILYN x 2 to LAD, also found to have occluded RCA.  Troponin markedly high  TTE showed EF 30-35% with multiple severe WMA.  LDL was 138, HDL 40  SBP soft, 80's-100's  - Initially diuresed with Lasix and Torsemide, stopped due to worsening renal function  - Treated with aspirin, Brilinta, metoprolol, lisinopril  - Metoprolol then held due to hypotension  - Lisinopril then held due to FRANCY  - Initial plan was for outpatient consideration of staged intervention, or possible ICD placement if repeat TTE shows persistently diminished LVEF.    Acute hypoxic respiratory failure possibly due to HCAP vs  amiodarone toxicity  S/p thoracenteses of his left and right sided effusions with negative fluid studies for infection and negative cytology thus far  Progressive leukocytosis  CXR 5/8 with extensive bilateral airspace opacities, worsening  CT chest 5/7 showing diffuse bilateral pulmonary infiltrates  Differential included HCAP +/- ARDS, amiodarone toxicity, CHF (although hypoxia has not improved despite diuresis and weight has been stable to improved)  - Amiodarone stopped 5/6 due to possible amiodarone toxicity  - Initiated Solumedrol 40mg daily on 5/8  - Transferred to ICU on 5/8 due to need for BIPAP and consideration for intubation  - started on Vanco, Levaquin and Zosyn  - intensivist consulted  - Patient was changed to DNR/DNI on 5/8 after discussion with intensitivist  - Lazaro was placed  - Patient had worsening respiratory failure early this morning with respiratory rates in the 50's.  After complex discussion as per chart with the care team and patient, patient opted for comfort cares.  BiPAP was removed and he was pronounced dead by Hernan CHAMPION at 0310 today.    New onset atrial fibrillation with RVR  [id number] score is 6.  - Amiodarone initially started, but stopped due to concern for possible amiodarone toxicity  - Was on DAPT for stroke prevention.    FRANCY  Cr vishal to 1.6, likely prerenal and improved.  - Holding lisinopril    Suspected right upper extremity cellulitis  Seen surrounding IV infusion site on 5/3.  - Ancef started on 5/3 with improvement, and was covered with antibiotics for HCAP  - plan was for 10-14 days total antibiotic therapy    Type 2 Diabetes mellitus with severe hypoglycemia  Diet controlled as an outpatient. A1c 6.3.   Hypoglycemic down to <10 on 5/8 treated with D50 and glucose gel  - On D10 + 1/2NS  - D50 PRN  - blood glucose was monitored closely    Superficial clot in the left cephalic vein: Resolved    Cause of death: Severe acute hypoxic respiratory failure despite max BiPAP  settings due to unclear etiology with CXR showing bilateral pulmonary infiltrates.  Amiodarone toxicity vs healthcare associated pneumonia were potential etiologies for his respiratory failure.  He was placed on comfort cares per his final wishes as he was DNR/DNI and failing BiPAP.  There was no immediate family available to contact.    Khalif Christina    Significant Results and Procedures       Pending Results   Unresulted Labs Ordered in the Past 30 Days of this Admission     Date and Time Order Name Status Description    5/8/2017 1123 Blood culture Preliminary     5/8/2017 1123 Blood culture Preliminary     5/5/2017 0945 Cytology non gyn In process           Primary Care Physician   Lalo Kee    Consultations This Hospital Stay   CARDIAC REHAB IP CONSULT  NUTRITION SERVICES ADULT IP CONSULT  PHARMACY IP CONSULT  PHARMACY IP CONSULT  WOUND OSTOMY CONTINENCE NURSE  IP CONSULT  INTERNAL MEDICINE IP CONSULT  SOCIAL WORK IP CONSULT  PHARMACY TO DOSE VANCO  NUTRITION SERVICES ADULT IP CONSULT  PULMONARY IP CONSULT  PHARMACY TO DOSE VANCO  VASCULAR ACCESS ADULT IP CONSULT  CARDIAC REHAB IP CONSULT  NUTRITION SERVICES ADULT IP CONSULT  PHARMACY IP CONSULT  PHARMACY IP CONSULT  INTENSIVIST IP CONSULT  SOCIAL WORK IP CONSULT  SMOKING CESSATION PROGRAM IP CONSULT  SMOKING CESSATION PROGRAM IP CONSULT    Time Spent on this Encounter   I, Khalif Christina, discharged this patient today but I did not personally see the patient today and will not be billing for the patient's discharge.    Data   Most Recent 3 CBC's:  Recent Labs   Lab Test  05/09/17   0400  05/08/17   0545  05/08/17   0150   WBC  27.6*  21.6*  19.9*   HGB  11.0*  12.9*  13.0*   MCV  90  90  90   PLT  448  506*  521*      Most Recent 3 BMP's:  Recent Labs   Lab Test  05/09/17   1402  05/09/17   0400  05/08/17   0950  05/08/17   0545   NA  148*  145*   --   141   POTASSIUM  4.3  4.8   --   4.6   CHLORIDE  111*  109   --   103   CO2  29  25   --   30   BUN  42*   40*   --   45*   CR  1.40*  1.37*   --   1.13   ANIONGAP  8  11   --   8   ROBLES  8.2*  7.5*   --   8.5   GLC  152*  200*  294*  140*     Most Recent 2 LFT's:  Recent Labs   Lab Test  05/08/17   0150  02/27/16   1619   AST  39  18   ALT  34  20   ALKPHOS  85  89   BILITOTAL  0.5  0.8     Most Recent INR's and Anticoagulation Dosing History:  Anticoagulation Dose History     Recent Dosing and Labs Latest Ref Rng & Units 4/26/2017    INR 0.86 - 1.14 0.96        Most Recent 3 Troponin's:  Recent Labs   Lab Test  04/27/17   0525  04/26/17   2207  04/26/17   1355   04/26/17   0347   TROPI  125.570*  >200.000  The 99th percentile for upper reference range is 0.045 ug/L.  Troponin values in   the range of 0.045 - 0.120 ug/L may be associated with risks of adverse   clinical events.   Previous critical documented  *  >200.000  The 99th percentile for upper reference range is 0.045 ug/L.  Troponin values in   the range of 0.045 - 0.120 ug/L may be associated with risks of adverse   clinical events.   Previous critical documented  *   < >   --    TROPONIN   --    --    --    --   14.44*    < > = values in this interval not displayed.     Most Recent Cholesterol Panel:  Recent Labs   Lab Test  04/27/17   0525   CHOL  191   LDL  138*   HDL  40   TRIG  64     Most Recent 6 Bacteria Isolates From Any Culture (See EPIC Reports for Culture Details):  Recent Labs   Lab Test  05/08/17   1330  05/08/17   1325  05/05/17   1400  05/04/17   1440  07/09/09   2329  04/12/09   1240   CULT  No growth after 2 days  No growth after 2 days  No growth  No growth  No growth  No growth after 6 days  No growth after 6 days     Most Recent TSH, T4 and A1c Labs:  Recent Labs   Lab Test  04/27/17   0525   A1C  6.3*     Results for orders placed or performed during the hospital encounter of 04/26/17   XR Chest Port 1 View    Narrative    CHEST ONE VIEW UPRIGHT 4/28/2017 5:22 AM     HISTORY: Pulmonary edema.    COMPARISON: 4/26/2017.      Impression     IMPRESSION: Increasing densities in the mid lungs bilaterally, right  worse than left. Edema and/or infectious infiltrate. Minimal increase  in small bilateral effusions.    CHUCK RUIZ MD   XR Chest Port 1 View    Narrative    XR CHEST PORT 1 VW  4/29/2017 4:50 PM     HISTORY:  interval exam    COMPARISON: Film dated 4/28/2017    FINDINGS:  Bilateral pleural effusions are again noted. These have not  changed significantly. Diffuse interstitial and alveolar infiltrates  are seen. These are greater on the right than the left. These have  improved slightly in the left lung since the previous film.      Impression    IMPRESSION:  1. No significant change in the pleural effusions.  2. Slightly improved infiltrate.    ALMAZ HENRIQUEZ MD   US Upper Extremity Venous Duplex Left    Narrative    US UPPER EXTREMITY VENOUS DUPLEX LEFT 4/30/2017 12:15 AM    HISTORY: Pain in the antecubital area. Evaluate for abscess.    COMPARISON: None.    TECHNIQUE:  Venous Doppler US of the left upper extremity. Color flow  and spectral Doppler with waveform analysis performed.    FINDINGS: The deep veins in the left upper extremity are compressible  throughout. The deep veins demonstrate normal venous augmentation,  waveforms and color Doppler flow. The subclavian and internal jugular  veins demonstrate normal color Doppler flow without intraluminal  thrombus. Superficial thrombus is present in the left cephalic vein  extending from the level of the midhumerus to the antecubital fossa.  There is no evidence of abscess.      Impression    IMPRESSION:   1. No evidence of DVT.   2. No abscess.  3. Superficial clot in the left cephalic vein.    CAN MCCABE MD   XR Chest 2 Views    Narrative    XR CHEST 2 VW   5/3/2017 12:34 PM     HISTORY: ongoing hypoxia, diminished to absent bases    COMPARISON: 4/29/2017.      Impression    IMPRESSION: Again identified are small bilateral pleural effusions.  Mixed interstitial and alveolar infiltrate in the  right mid to upper  lung is not significantly changed. Mild interstitial infiltrate in the  left upper lung is unchanged.    DALI SUMMERS MD   US Thoracentesis    Narrative    EXAM: US THORACENTESIS       5/4/2017 2:57 PM       HISTORY: Left pleural effusion      PROCEDURE:  Written informed consent was obtained from the patient  prior to the procedure. The risks and benefits including bleeding,  infection and pneumothorax were discussed and the patient wished to  continue. Initial ultrasound images demonstrated a left-sided pleural  fluid collection. A permanent image was saved. The skin overlying this  collection was marked, prepped, draped and anesthetized in usual  sterile fashion utilizing 5 mL of lidocaine 1%. Thoracentesis catheter  was then placed into the pleural fluid collection with return of 475  mL of yellow fluid. Estimated blood loss during the procedure was less  than 5 mL. No specimens collected. Patient tolerated the procedure  well. Followup chest x-ray was ordered.        Impression    IMPRESSION:  Ultrasound-guided left-sided thoracentesis.     CESAR RAMOS PA-C   XR Chest 1 View    Narrative    XR CHEST 1 VW 5/4/2017 3:04 PM    COMPARISON: 5/3/2017    HISTORY: Left thoracentesis.      Impression    IMPRESSION: Small residual bilateral pleural effusions following  thoracentesis. No pneumothorax. Mixed interstitial and airspace  opacities over both lungs, right greater than left, slightly worsened  since yesterday's study.    FANTA RIVERA   US Thoracentesis    Narrative    ULTRASOUND GUIDED THORACENTESIS  5/5/2017 2:18 PM     HISTORY: Right-sided pleural effusion.    FINDINGS: Ultrasound was used to evaluate for the presence and best  approach for drainage of a pleural effusion. Written and oral informed  consent was obtained. A pause for the cause procedure to verify the  correct patient and correct procedure. The skin overlying the right  chest posteriorly was prepped and draped in the  usual sterile fashion.  The subcutaneous tissues were anesthetized with 5 mL lidocaine 1%. A  catheter was advanced into the pleural space and 475 mL of  jaquelin  colored fluid was drained. Patient was monitored by nurse under my  direct supervision throughout the exam. Ultrasound images were  permanently stored.  There were no immediate complications. Patient  left the ultrasound suite in satisfactory condition.      Impression    IMPRESSION: Technically successful thoracentesis without immediate  complications.    CHUCK RUIZ MD   XR Chest 1 View    Narrative    XR CHEST 1 VW 5/5/2017 2:21 PM    HISTORY: Thoracentesis.    COMPARISON: May 4, 2017.      Impression    IMPRESSION: Diffuse pulmonary opacities as before, with bilateral  pleural effusions, right greater than left, not significantly changed  from prior examination. No pneumothorax.    KAIA ROMERO MD   XR Chest 2 Views    Narrative    XR CHEST 2 VW 5/7/2017 8:48 AM    HISTORY: Hypoxemia, diffuse crackles      Impression    IMPRESSION: Diffuse bilateral pulmonary infiltrates with minimal  bibasilar pleural effusions or pleural thickening. No change compared  to 5/5/2017.    FLORES SÁNCHEZ MD   CT Chest w/o Contrast    Narrative    CT CHEST W/O CONTRAST 5/7/2017 11:36 AM    HISTORY:  infiltrate     TECHNIQUE: Volumetric acquisition of CT images from the lung apices  through the upper abdomen. Radiation dose for this scan was reduced  using automated exposure control, adjustment of the mA and/or KV  according to patient size, or iterative reconstruction technique. Exam  limited without contrast material. If symptoms or clinical concern  persists, contrast study may be helpful in further evaluation.     COMPARISON: None.      Impression    IMPRESSION: Diffuse bilateral pulmonary infiltrates. Portion of this  could be due to fibrosis. Tiny bibasilar pleural effusions. 2 cm  enlarged right paratracheal lymph node. No other findings.    FLORES SÁNCHEZ MD   XR  Chest Port 1 View    Narrative    CHEST SINGLE VIEW PORTABLE   5/8/2017 1:35 AM     HISTORY: Shortness of breath.    COMPARISON: 5/7/2017 at 0844 hours.    FINDINGS: Extensive airspace opacities scattered throughout both  lungs, moderately increased since the comparison study. Probable  cardiomegaly. Very small bilateral pleural effusions again noted.      Impression    IMPRESSION:   1. Extensive airspace opacities scattered throughout both lungs,  moderately increased since 5/7/2017 at 0844 hours. These could  represent pneumonia or pulmonary edema.  2. Very small bilateral pleural effusions again noted.    MARIEL DE LEON MD   XR Chest Port 1 View    Narrative    XR CHEST PORT 1 VW 5/9/2017 9:15 AM    COMPARISON: 5/8/2017    HISTORY: Intubated and ventilated.      Impression    IMPRESSION: Patchy mixed interstitial and airspace opacities over both  lungs are again seen, stable to slightly decreased in prominence.  Likely trace bilateral pleural effusions. No pneumothorax seen on  either side. Endotracheal tube is not visualized. Right upper  extremity PICC tip at the atrial caval junction.    FANTA RIVERA   XR Chest Port 1 View    Narrative    CHEST SINGLE VIEW PORTABLE  5/10/2017 12:42 AM     HISTORY: Hypoxia.    COMPARISON: 5/9/2017 at 0908 hours.    FINDINGS: Extensive alveolar and interstitial opacities throughout  both lungs with relative sparing of the upper left lung, unchanged.  Small bilateral pleural effusions again noted. A right upper extremity  peripherally inserted central catheter is again present with distal  catheter tip in the mid superior vena cava, likely pulled back a few  centimeters in the interval.      Impression    IMPRESSION:  1. A right upper extremity peripherally inserted central catheter is  present with distal catheter tip in the superior vena cava. The  catheter has likely been pulled back a few centimeters since the  comparison study.  2. No other interval change.  3. Extensive  pulmonary opacities throughout both lungs and small  bilateral pleural effusions.    MARIEL DE LEON MD

## 2017-05-10 NOTE — PROGRESS NOTES
"Hernan CHAMPION paged regarding pt's O2 sats sustaining in the 80s and pts continued request to take Bipap mask off.  Per Hernan CHAMPION, consult Tele-ICU regarding further orders.  Dr Zimmerman in Tele-ICU contacted regarding pt's request to remove Bipap mask and to be \"done\".  New orders for Comfort care and Morphine received.    "

## 2017-05-10 NOTE — PROGRESS NOTES
"S:   RN called and informed that pt's WOB is up.  On BiPAP at 100% FiO2.  RR 40s.  He is DNR/DNI.  Pt apparently expressed to RN that he wants BiPAP off.  He is tired of fighting.      O:  /54  Pulse 67  Temp 99  F (37.2  C) (Axillary)  Resp (!) 33  Ht 1.651 m (5' 5\")  Wt 67.1 kg (147 lb 14.9 oz)  SpO2 92%  BMI 24.62 kg/m2   General: aao x 3, hyperventilating, in mod distress due to resp failure.  Lungs:  Coarse SB, + accessory muscle use.   CVS:  Nl s1 and s2, no m/r/g.    A/p:  I arrived and examined pt.  I told him he max'd out on the BiPAP.  I discussed his options.  I am aware his DNR/DNI. I gave pt options of comfort care vs intubation.  Pt initially requested I change his code status to full code.  He requested to be intubated.  I agreed to intubate him but I got resistance from RT and charge nurse not to intubate the pt.  I felt it was inappropriate that every one is trying to influence the pt to be on comfort care.    ----  I order ABG which the RT initially refused to get (to help with decision of changing pt's code status, from DNR/DNI to comfort care).  ----  CXR obtained, result pending.  ----  Ativan 0.5 mg iv x one.  ----  I do not feel comfortable changing pt's code status tonight.  As a physician, I felt I was challenged by RT and charge nurse thus I was unable to provide the best possible care for this pt.      -----  Contrary to charge nurse's note, I did not tell the pt he needs to be intubated or he will die.  I have just informed the pt of his options.  I wants him to be aware off all his options.    -----  I see my role as a physician is to do what best for the pt and not what is convenient for RT or charge nurse.  I did my best to explain to pt that he may may die if I take him off the BiPAP machine.  If he wants to me to change his code status to comfort care, then I can d/c the BiPAP and order comfort care meds.  I have also told him that if he chooses to be intubate, I will do " that for him.  I told him intubation may or may not help but he has an option.  Pt then asked me to intubate him right way but charge nurse refused to call CRNA.  Instead she went back to pt's room and convinced pt to change his status to comfort care.  -----  I called the administrative nurse on duty and asked her to get involved.  -----  I went back to pt's room once more and talked with the pt.  I gave him both the comfort care and intubation option, pt chose comfort care.  I wonder if comfort care is truly pt's wishes.     Mariajose Dennis MD.   Hospitalist.  [phone], pager.

## 2017-05-10 NOTE — PROGRESS NOTES
"This writer is assigned as charge nurse for this shift. Bedside nurse and RT continuing to monitor patient and report that pt continues to have respiratory rate in the 40-60s on 100% bipap. Pt is completely alert and oriented and made the decision several days ago to be DNI after several discussions with ICU MD that condition of patient is near end of life. For only a few seconds of oral care pt quickly desaturates and reported to bedside nurse that he is \"ready to be done.\" Call out to hospitalist to obtain comfort care orders. Upon arrival to the Unit, MD ordered ABG, CXR and ativan. In addition, MD tells patient that he will not survive w/o intubation and advises writer to notify CRNA. In an attempt to have a collaborative team conversation and adhere to patient's wishes, this writer asked the MD what treatments would arise from these tests. As a result, MD became extremely defensive and accused writer, RT and bedside RN of insubordination. This writer tried to explain that pt is in respiratory distress and being told that he would die unless he was tubed wasn't a realistic conversation with patient and did not display patient advocacy. MD ordered ativan for patient and advised RN to notify House MD for any further concerns with patient status.    "

## 2017-05-10 NOTE — PROGRESS NOTES
Called by nursing that patient has requested to be transitioned to comfort care with removal of BiPAP.  Aggressiveness of care and Code status was previously discussed with patient by primary ICU team and patient decided to be DNR/DNI.  Orders for morphine and Ativan placed.

## 2017-05-11 LAB — INTERPRETATION ECG - MUSE: NORMAL

## 2017-05-14 LAB
BACTERIA SPEC CULT: NO GROWTH
BACTERIA SPEC CULT: NO GROWTH
Lab: NORMAL
Lab: NORMAL
MICRO REPORT STATUS: NORMAL
MICRO REPORT STATUS: NORMAL
SPECIMEN SOURCE: NORMAL
SPECIMEN SOURCE: NORMAL

## 2022-06-19 NOTE — PROGRESS NOTES
[facility] Hospitalist Internal Medicine Progress Note      Subjective:    Patient complains of right groin pain, ongoing swelling from hematoma.  Denies shortness of breath/nausea.  Ice and hot packs are helping    Review of Systems  Negative for all 10 systems except as per subjective    I/O's    Intake/Output Summary (Last 24 hours) at 6/19/2022 1234  Last data filed at 6/19/2022 0930  Gross per 24 hour   Intake 120 ml   Output --   Net 120 ml         CIWA   Total Score:       ALLERGIES:  Patient has no known allergies.     Hospital Meds  Current Facility-Administered Medications   Medication Dose Route Frequency Provider Last Rate Last Admin   • nystatin (MYCOSTATIN) 630800 UNIT/ML suspension 500,000 Units  500,000 Units Swish & Swallow 4x Daily Sukhdev Hollis MD   500,000 Units at 06/19/22 0836   • acetaminophen (TYLENOL) tablet 1,000 mg  1,000 mg Oral 3 times per day Joan Ali, DO   1,000 mg at 06/19/22 0604   • albuterol inhaler 2 puff  2 puff Inhalation Q4H Resp PRN Joan Ali, DO       • AMIODarone (PACERONE) tablet 400 mg  400 mg Oral 2 times per day Joan Ali, DO   400 mg at 06/19/22 0836   • apixaBAN (ELIQUIS) tablet 5 mg  5 mg Oral 2 times per day Joan Ali, DO   5 mg at 06/19/22 0836   • pantoprazole (PROTONIX) EC tablet 40 mg  40 mg Oral QAM AC Joan Ali, DO   40 mg at 06/19/22 0604   • sodium chloride 0.9 % flush bag 25 mL  25 mL Intravenous PRN Joan Ali, DO       • sodium chloride (PF) 0.9 % injection 2 mL  2 mL Intracatheter 2 times per day Joan Ali, DO       • docusate sodium-sennosides (SENOKOT S) 50-8.6 MG 2 tablet  2 tablet Oral Daily PRN Joan Ali, DO       • sodium chloride 0.9 % flush bag 25 mL  25 mL Intravenous PRN Joan Ali, DO       • melatonin tablet 9 mg  9 mg Oral Nightly PRN Joan Ali, DO       • triamcinolone (ARISTOCORT) 0.1 % cream 1 application  1 application Topical Q Evening Joan Ali, DO   1 application at 06/18/22 1727   • calcium carbonate (TUMS) chewable tablet 500 mg  500 mg Oral Q4H PRN  [facility]    Hospitalist Progress Note    Date of Service (when I saw the patient): 04/28/2017    Assessment & Plan   Matias Mroel is an 81-year-old male with a past medical history significant for diet-controlled type 2 diabetes, hypertension, hyperlipidemia, BPH, newly diagnosed 2-vessel coronary artery disease with acute anterior ST elevation myocardial infarction occurring on 04/26/2017 who is being evaluated for diabetic management.     Newly diagnosed 2-vessel CAD in the setting of acute anterior STEMI occurring on 04/26/2017 with associated hypertension, hyperlipidemia:   - mgt per cardiology  - s/p successful PCI to the proximal and mid LAD with overlapping drug-eluting stent placement.   - started on aspirin 81 mg daily, + Brilinta 90 mg BID uninterrupted for at least 1 year. Lopressor 12.5 mg BID (plan to switch to coreg or toprol XL) lisinopril 2.5 mg daily, atorvastatin 40 mg daily  - lipid profile showed a cholesterol of 191, HDL 40, LDL of 138, non-HDL cholesterol of 151 and triglycerides of 64.    Acute hypoxic respiratory failure secondary to acute decompensated HFrEF / ischemic cardiomyopathy   - Echo on 4/26 shows mild to moderated LVH with EF of 30-35% with large anteroapical segment of severe hypokinesis to akinesis, also involing distal inferolateral and lateral walls, grade II diastolic heart failure.   - Diuresing with lasix 40 mg IV TID.     Recent Labs  Lab 04/28/17  0548 04/27/17  0525 04/26/17  0340   CR 1.19 0.96 0.83   - K 3.9 ordered 20 meq potassium x 1 on 4/28 while diuresing  - Strict I and O's, daily weights ordered 4/28.  - Given late presentation of AMI doubtful LV function would recover despite PCI. Repeat echo in 3 months. ICD if EF <35%    Paroxysmal Afib with RVR on 4/27  - started on amio drip > converted to PO loading.   - No need for OAC at this point given long duration and the presence of DAPT.    Leukocytosis - noted and suspect secondary to AMI. No  Joan Ali, DO       • docusate sodium-sennosides (SENOKOT S) 50-8.6 MG 1 tablet  1 tablet Oral Nightly Joan Ali, DO   1 tablet at 06/18/22 2103   • bumetanide (BUMEX) tablet 2 mg  2 mg Oral Daily Joan Ali, DO   2 mg at 06/19/22 0836   • digoxin (LANOXIN) tablet 250 mcg  250 mcg Oral Every Other Day Joan Ali, DO   250 mcg at 06/19/22 0835   • simethicone (MYLICON) tablet 125 mg  125 mg Oral 4x Daily PRN Joan Ali, DO       • sodium chloride 0.9% infusion   Intravenous Continuous PRN Joan Ali, DO       • oxyCODONE (IMM REL) (ROXICODONE) tablet 5 mg  5 mg Oral Q4H PRN Joan Ali, DO   5 mg at 06/19/22 0834   • sacubitril-valsartan (ENTRESTO) 24-26 MG per tablet 1 tablet  1 tablet Oral BID Joan Ali, DO   1 tablet at 06/19/22 0835   • spironolactone (ALDACTONE) tablet 25 mg  25 mg Oral Daily Joan Ali, DO   25 mg at 06/19/22 0836   • metoPROLOL succinate (TOPROL-XL) ER tablet 12.5 mg  12.5 mg Oral Nightly Joan Ali, DO   12.5 mg at 06/18/22 2103   • polyethylene glycol (MIRALAX) packet 17 g  17 g Oral Daily PRN Gwen Garcia CNP       • docusate sodium (ENEMEEZ MINI) 283 MG rectal enema 283 mg  283 mg Rectal Daily PRN Gwen Garcia CNP            Last Recorded Vitals  Visit Vitals  /63 (BP Location: LLE - Left lower extremity, Patient Position: Supine)   Pulse 80   Temp 98.6 °F (37 °C) (Oral)   Resp 16   Ht 5' 2\" (1.575 m)   Wt 123.3 kg (271 lb 13.2 oz)   SpO2 93%   BMI 49.72 kg/m²         SpO2 Readings from Last 3 Encounters:   06/19/22 93%   06/16/22 97%   05/27/22 100%        Physical Exam  Vitals and nursing note reviewed.   Constitutional:       General: He is not in acute distress.     Appearance: Normal appearance.   HENT:      Head: Normocephalic and atraumatic.      Mouth/Throat:      Pharynx: No oropharyngeal exudate.      Neck: Normal range of motion and neck supple. No rigidity.   Eyes:      General: No scleral icterus.     Extraocular Movements: Extraocular movements intact.      Conjunctiva/sclera:  symptoms to suggest acute infection. No fevers chills, pleuritic chest pain. Reviewed CXR and bilateral patchy infiltrates with pleural effusions most c/w CHF.     Recent Labs  Lab 04/28/17  0548 04/27/17  0525 04/26/17  0340   WBC 16.7* 20.2* 14.1*     Diet-controlled type 2 diabetes with stress hyperglycemia around MI: A1c of 6.3 on 4/27,    Recent Labs  Lab 04/28/17  0728 04/28/17  0548 04/27/17  2114 04/27/17  1737 04/27/17  1208 04/27/17  0732 04/27/17  0525 04/26/17  0340   GLC  --  128*  --   --   --   --  153* 187*   *  --  122* 87 172* 152*  --   --      - continue diabetic diet, follow up A1c in 3 months.     History of BPH: s/p TURP procedure. Stable      Deep venous thrombosis prophylaxis: Per Cardiology, patient has been started on a baby aspirin daily, plavix and PCDs. AMBULATE.       CODE STATUS: Discussed with the patient, he elected to be full code.     Dispo: ultimately per cardiology but suspect will need at least 2 more days given decompensated heart failure.     Mariam Duncan  [phone] (p)  Text Page (7AM - 6PM)     Interval History   No complaints, breathing comfortably but remains on oxygen at 8 liters. Not measuring I and O's and daily weights, no fulids.     -Data reviewed today: I reviewed all new labs and imaging results over the last 24 hours. I personally reviewed the chest x-ray image(s) showing as above. .    Physical Exam   Temp: 97.7  F (36.5  C) Temp src: Oral BP: 94/64 Pulse: 100 Heart Rate: 74 Resp: 16 SpO2: 92 % O2 Device: Oxymizer cannula Oxygen Delivery: 10 LPM  Vitals:    04/26/17 0615 04/27/17 0500 04/28/17 0500   Weight: 67.9 kg (149 lb 11.1 oz) 66.9 kg (147 lb 7.8 oz) 67.2 kg (148 lb 2.4 oz)     Vital Signs with Ranges  Temp:  [97.4  F (36.3  C)-98.1  F (36.7  C)] 97.7  F (36.5  C)  Pulse:  [100] 100  Heart Rate:  [] 74  Resp:  [16] 16  BP: ()/(44-73) 94/64  SpO2:  [90 %-100 %] 92 %  I/O last 3 completed shifts:  In: 790 [P.O.:790]  Out: 950  Conjunctivae normal.   Cardiovascular:      Rate and Rhythm: Normal rate and regular rhythm.      Pulses: Normal pulses.      Heart sounds: Normal heart sounds.   Pulmonary:      Breath sounds: Normal breath sounds. No wheezing, rhonchi or rales.   Abdominal:      General: Bowel sounds are normal. There is no distension.      Palpations: Abdomen is soft.      Tenderness: There is no abdominal tenderness.   Musculoskeletal:         General: Swelling and tenderness present. Normal range of motion.      Right lower leg: No edema.      Left lower leg: No edema.      Comments: Large right groin hematoma without change   Lymphadenopathy:      Cervical: No cervical adenopathy.   Skin:     General: Skin is warm and dry.      Coloration: Skin is not jaundiced.      Findings: No rash.      Comments: Diffuse psoriatic plaques   Neurological:      General: No focal deficit present.      Mental Status: He is alert and oriented to person, place, and time. Mental status is at baseline.   Psychiatric:         Mood and Affect: Mood normal.         Behavior: Behavior normal.         Labs   No results found for this or any previous visit (from the past 24 hour(s)).    Imaging    No orders to display       Cultures  Microbiology Results     None             Assessment/Plan:    Impression:           Chronic systolic CHF, appears compensated  Right groin hematoma due to pseudoaneurysm, resolving     Acute blood loss anemia due to above     Paroxysmal atrial fibrillation, s/p DCCV, on Eliquis     Psoriasis     Morbid obesity              Plan:           Continue current medications as ordered, including amiodarone load/Bumex/digoxin/metoprolol/Entresto  Eliquis for A. fib  Follow groin appearance, track hemoglobin intermittently.  Warm compresses/heating pad to promote resorption  Bowel regimen  Topical steroids for psoriasis  Thank you for consult, will follow along closely with you    Primary Care Physician  Neetu Marshall MD    Code  [Urine:950]    Constitutional:  NAD,   Neuropsyche:  alert and oriented, answers questions appropriately.   Respiratory:  Breathing comfortably on NC, diffuse crackles bilaterally.    Cardiovascular:  Regular rate and rhythm, no edema.  GI:  soft, NT/ND, BS normal  Skin/Integumen:  No acute rash or sign of bleeding.     Medications   All medications reviewed on 04/28/17      amiodarone (CORDARONE) infusion ADULT 0.5 mg/min (04/28/17 0441)     Percutaneous Coronary Intervention orders placed (this is information for BPA alerting)         furosemide  40 mg Intravenous TID     insulin aspart  1-3 Units Subcutaneous TID AC     insulin aspart  1-3 Units Subcutaneous At Bedtime     metoprolol         sodium chloride (PF)  3 mL Intracatheter Q8H     aspirin EC  81 mg Oral Daily     ticagrelor  90 mg Oral Q12H     metoprolol  25 mg Oral BID     lisinopril  2.5 mg Oral Daily     atorvastatin  40 mg Oral Daily       Data     Recent Labs  Lab 04/28/17  0548 04/27/17  0525 04/26/17  2207 04/26/17  1355  04/26/17  0347 04/26/17  0340   WBC 16.7* 20.2*  --   --   --   --  14.1*   HGB 13.1* 14.8  --   --   --   --  15.6   MCV 90 91  --   --   --   --  92    313  --   --   --   --  402   INR  --   --   --   --   --   --  0.96    141  --   --   --   --  139   POTASSIUM 3.9 4.4  --   --   --   --  3.8   CHLORIDE 102 105  --   --   --   --  103   CO2 27 28  --   --   --   --  28   BUN 33* 18  --   --   --   --  13   CR 1.19 0.96  --   --   --   --  0.83   ANIONGAP 9 8  --   --   --   --  8   ROBLES 8.3* 8.6  --   --   --   --  8.8   * 153*  --   --   --   --  187*   TROPI  --  125.570* >200.000The 99th percentile for upper reference range is 0.045 ug/L.  Troponin values in the range of 0.045 - 0.120 ug/L may be associated with risks of adverse clinical events. Previous critical documented* >200.000The 99th percentile for upper reference range is 0.045 ug/L.  Troponin values in the range of 0.045 - 0.120 ug/L may be  Status    Code Status: Full Resuscitation    Gabriela JOHN Hospitalist  6/19/2022 12:34 PM         associated with risks of adverse clinical events. Previous critical documented*  < >  --  29.002*   TROPONIN  --   --   --   --   --  14.44*  --    < > = values in this interval not displayed.    No results found for this or any previous visit (from the past 24 hour(s)).

## 2022-12-03 NOTE — PROGRESS NOTES
Cardiology    Patient seen and examined.  Events overnight reviewed.      Blood pressure 93/58, pulse 100, temperature 98  F (36.7  C), temperature source Oral, resp. rate 16, weight 67.2 kg (148 lb 2.4 oz), SpO2 96 %.    CV: RRR without m/g/r  Lungs: rales at bases  Abdomen: + BS soft  Ext: warm, no edema, groin stable    Echo:    The left ventricle is normal in size.  There is mild to moderate concentric left ventricular hypertrophy.  The visual ejection fraction is estimated at 30-35%.  There is a large anteroapical segment of severe hypokinesis to akinesis, also  involving the distal inferolateral and lateral walls. Consistent with MI in  LAD territory.  Grade II or moderate diastolic dysfunction.  E by E prime ratio is greater than 15, that likely suggests increased left  ventricular filling pressures.  There is no comparison study available. The study was technically difficult      A/P    1.  Anterior STEMI  -- late presentation  2.  Ischemic CM  3.  HTN   4.  Hyperlipidemia  5.  Elevated WBC - decreasing to 16, CXR with Pulm edema +/- infectious process  6.  PAF - now in sinus, on amiodarone  7.  DM    Recs:    1.  Anterior STEMI:               --  Continue ASA + Brillinta               --  Pulm edema with increased O2 requirements.  Will change to bid on Lasix as creatinine is rising               --  Lopressor 12.5 mg bid (will change to toprol or coreg at some point)               --  Continue lisinopril with up titration.               --  Continue Lipitor    2.  ICM:                --  As above               --  Echo as noted above               --  Late presentation, unclear recovery long term, may need ICD in future    3.  Cardiac rehab     4.  Elevated WBC:                  --  No signs/symptoms of infection                 --  May be related to MI                 --  Repeat CBC improving                 --  PCXR with pulm edema and ?atlectasis or pneumonia. Will defer to medicine whether Abx are  needed.  Appreciate their help    5.  Patient would benefit from TCU rather than going home directly.     6.  PAF:  Appreciate EP help.  Will keep on Amiodarone.  If persists then will consider anticoagulation.        Katelyn Clay MD   none

## (undated) RX ORDER — HEPARIN SODIUM 1000 [USP'U]/ML
INJECTION, SOLUTION INTRAVENOUS; SUBCUTANEOUS
Status: DISPENSED
Start: 2017-01-01

## (undated) RX ORDER — FENTANYL CITRATE 50 UG/ML
INJECTION, SOLUTION INTRAMUSCULAR; INTRAVENOUS
Status: DISPENSED
Start: 2017-01-01

## (undated) RX ORDER — NITROGLYCERIN 5 MG/ML
VIAL (ML) INTRAVENOUS
Status: DISPENSED
Start: 2017-01-01